# Patient Record
Sex: FEMALE | Race: WHITE | NOT HISPANIC OR LATINO | Employment: OTHER | URBAN - METROPOLITAN AREA
[De-identification: names, ages, dates, MRNs, and addresses within clinical notes are randomized per-mention and may not be internally consistent; named-entity substitution may affect disease eponyms.]

---

## 2017-01-24 ENCOUNTER — TRANSFERRED RECORDS (OUTPATIENT)
Dept: HEALTH INFORMATION MANAGEMENT | Facility: CLINIC | Age: 72
End: 2017-01-24

## 2017-02-14 ENCOUNTER — OFFICE VISIT (OUTPATIENT)
Dept: FAMILY MEDICINE | Facility: CLINIC | Age: 72
End: 2017-02-14
Payer: COMMERCIAL

## 2017-02-14 VITALS
HEIGHT: 63 IN | SYSTOLIC BLOOD PRESSURE: 118 MMHG | OXYGEN SATURATION: 96 % | TEMPERATURE: 98.5 F | DIASTOLIC BLOOD PRESSURE: 64 MMHG | HEART RATE: 110 BPM

## 2017-02-14 DIAGNOSIS — L98.9 EXTERNAL NASAL LESION: ICD-10-CM

## 2017-02-14 DIAGNOSIS — J20.9 ACUTE BRONCHITIS, UNSPECIFIED ORGANISM: Primary | ICD-10-CM

## 2017-02-14 PROCEDURE — 99213 OFFICE O/P EST LOW 20 MIN: CPT | Performed by: FAMILY MEDICINE

## 2017-02-14 RX ORDER — DOXYCYCLINE 100 MG/1
100 CAPSULE ORAL 2 TIMES DAILY
Qty: 14 CAPSULE | Refills: 0 | Status: SHIPPED | OUTPATIENT
Start: 2017-02-14 | End: 2017-02-21

## 2017-02-14 NOTE — MR AVS SNAPSHOT
After Visit Summary   2/14/2017    Claribel Del Rio    MRN: [id number]           Patient Information     Date Of Birth          1945        Visit Information        Provider Department      2/14/2017 1:00 PM Chata Wallace MD [facility] [place]        Today's Diagnoses     Acute bronchitis, unspecified organism    -  1    External nasal lesion           Follow-ups after your visit        Additional Services     DERMATOLOGY REFERRAL       Your provider has referred you to: [facility]: [facility] [place] [phone]   [email or web address]    Please be aware that coverage of these services is subject to the terms and limitations of your health insurance plan.  Call member services at your health plan with any benefit or coverage questions.      Please bring the following with you to your appointment:    (1) Any X-Rays, CTs or MRIs which have been performed.  Contact the facility where they were done to arrange for  prior to your scheduled appointment.    (2) List of current medications  (3) This referral request   (4) Any documents/labs given to you for this referral                  Who to contact     If you have questions or need follow up information about today's clinic visit or your schedule please contact [facility] [place] directly at [phone].  Normal or non-critical lab and imaging results will be communicated to you by MyChart, letter or phone within 4 business days after the clinic has received the results. If you do not hear from us within 7 days, please contact the clinic through MyChart or phone. If you have a critical or abnormal lab result, we will notify you by phone as soon as possible.  Submit refill requests through [employer] or call your pharmacy and they will forward the refill request to us. Please allow 3 business days for your refill to be completed.          Additional Information About Your Visit        MyChart  "Information     [employer] lets you send messages to your doctor, view your test results, renew your prescriptions, schedule appointments and more. To sign up, go to [email or web address] . Click on \"Log in\" on the left side of the screen, which will take you to the Welcome page. Then click on \"Sign up Now\" on the right side of the page.     You will be asked to enter the access code listed below, as well as some personal information. Please follow the directions to create your username and password.     Your access code is: [email or web address]  Expires: 3/8/2017 12:12 PM     Your access code will  in 90 days. If you need help or a new code, please call your [place] clinic or [phone].        Care EveryWhere ID     This is your Care EveryWhere ID. This could be used by other organizations to access your [place] medical records  [id number]        Your Vitals Were     Pulse Temperature Height Pulse Oximetry Breastfeeding?       110 98.5  F (36.9  C) (Tympanic) 5' 3.25\" (1.607 m) 96% No        Blood Pressure from Last 3 Encounters:   17 118/64   16 112/66   16 120/68    Weight from Last 3 Encounters:   16 184 lb (83.5 kg)   16 180 lb (81.6 kg)   16 184 lb (83.5 kg)              We Performed the Following     DERMATOLOGY REFERRAL          Today's Medication Changes          These changes are accurate as of: 17  1:21 PM.  If you have any questions, ask your nurse or doctor.               Start taking these medicines.        Dose/Directions    doxycycline Monohydrate 100 MG Caps   Used for:  Acute bronchitis, unspecified organism   Started by:  Chata Wallace MD        Dose:  100 mg   Take 1 capsule (100 mg) by mouth 2 times daily for 7 days   Quantity:  14 capsule   Refills:  0            Where to get your medicines      These medications were sent to [facility] [address] [facility] [address]   [address] [address] "     Phone:  [phone]     doxycycline Monohydrate 100 MG Caps                Primary Care Provider Office Phone # Fax #    Chata Wallace [phone]       [facility] [address] [place] [address] DR  [place] [address]        Thank you!     Thank you for choosing [facility]  [place] your [place]. Our goal is always to provide you with excellent care. Hearing back from our patients is one way we can continue to improve our services. Please take a few minutes to complete the written survey that you may receive in the mail after your visit with us. Thank you!             Your Updated Medication List - Protect others around you: Learn how to safely use, store and throw away your medicines at www.[employer]eds.org.          This list is accurate as of: 2/14/17  1:21 PM.  Always use your most recent med list.                   Brand Name Dispense Instructions for use    albuterol 108 (90 BASE) MCG/ACT Inhaler    PROAIR HFA/PROVENTIL HFA/VENTOLIN HFA    2 Inhaler    Inhale 2 puffs into the lungs every 4 hours as needed for shortness of breath / dyspnea or wheezing       doxycycline Monohydrate 100 MG Caps     14 capsule    Take 1 capsule (100 mg) by mouth 2 times daily for 7 days       escitalopram 20 MG tablet    LEXAPRO    30 tablet    Take 1 tablet (20 mg) by mouth daily       fexofenadine-pseudoePHEDrine  MG per 12 hr tablet    ALLEGRA-D     Take 1 tablet by mouth 2 times daily       fluticasone 50 MCG/ACT spray    FLONASE         fluticasone-salmeterol 500-50 MCG/DOSE diskus inhaler    ADVAIR DISKUS    3 Inhaler    Inhale 1 puff into the lungs 2 times daily       metFORMIN 500 MG 24 hr tablet    GLUCOPHAGE-XR    90 tablet    Take 1 tablet (500 mg) by mouth daily (with dinner)       montelukast 10 MG tablet    SINGULAIR    90 tablet    Take 1 tablet (10 mg) by mouth At Bedtime       OMEGA-3 FISH OIL PO      Take 1 g by mouth daily       omeprazole 20 MG CR capsule     priLOSEC         simvastatin 20 MG tablet    ZOCOR    90 tablet    Take 1 tablet (20 mg) by mouth At Bedtime       VITAMIN D3 PO      Take 1,000 Units by mouth daily       zolpidem 10 MG tablet    AMBIEN    30 tablet    1 TABLET AT BEDTIME AS NEEDED

## 2017-02-14 NOTE — NURSING NOTE
"Chief Complaint   Patient presents with     URI       Initial /64 (BP Location: Left arm, Patient Position: Chair, Cuff Size: Adult Regular)  Pulse 110  Temp 98.5  F (36.9  C) (Tympanic)  Ht 5' 3.25\" (1.607 m)  SpO2 96%  Breastfeeding? No Estimated body mass index is 32.34 kg/(m^2) as calculated from the following:    Height as of 12/9/16: 5' 3.25\" (1.607 m).    Weight as of 12/9/16: 184 lb (83.5 kg).  BP completed using cuff size: regular    Health Maintenance Due   Topic Date Due     FOOT EXAM Q1 YEAR( NO INBASKET)  05/11/1946     DEPRESSION ACTION PLAN Q1 YR (NO INBASKET)  05/07/2016     ADVANCE DIRECTIVE PLANNING Q5 YRS (NO INBASKET)  10/04/2016         Martha Schumacher MA 2/14/17        "

## 2017-02-14 NOTE — PROGRESS NOTES
SUBJECTIVE:                                                    Claribel Del Rio is a 71 year old female who presents to clinic today for the following health issues:      RESPIRATORY SYMPTOMS      Duration: 6 days     Description  cough, chills, headache and fatigue/malaise    Severity: mild    Accompanying signs and symptoms: Chest pain when coughing. Pt reports that her chest burns also when coughing.     History (predisposing factors):  none    Precipitating or alleviating factors: None    Therapies tried and outcome:  Mucinex and drinking fluids. Not effective.      Patient has h/o asthma. She tells that  She is using her Advair twice a day, Singulair, Allegra and Flonase. Uses albuterol as needed. If has not used albuterol. She has been wheezing off and on.    Problem list and histories reviewed & adjusted, as indicated.  Additional history: as documented    Patient Active Problem List   Diagnosis     Obesity     Fibromyalgia     Advanced directives, counseling/discussion     Allergic rhinitis     Low back pain     DJD (degenerative joint disease) of knee     MIRZA (obstructive sleep apnea)- on CPAP     Mild intermittent asthma without complication     Major depressive disorder     Status post total knee replacement     Primary osteoarthritis of right knee     Status post total right knee replacement     Type 2 diabetes mellitus without complication (H)     Hyperlipidemia LDL goal <100     Mild single current episode of major depressive disorder (H)     Osteopenia     Past Surgical History   Procedure Laterality Date     Hysterectomy total abdominal, bilateral salpingo-oophorectomy, combined  11/18/1992     Fibroids, endometriosis, Dr Rosas     Tonsillectomy  1950     Appendectomy  1992     Carpal tunnel release rt/lt  05/02, 08/02     right 5/2002, left 8/2002     Manometry (esophageal)  10/2007     Normal     Colonoscopy  4/2009     Normal; repeat in 10 years     Joint replacement  4/2010     bilateral thumb      Colonoscopy N/A 11/4/2015     Procedure: COLONOSCOPY;  Surgeon: Cathie Melendez MD;  Location: [facility]     Arthroplasty knee Right 6/7/2016     Procedure: ARTHROPLASTY KNEE;  Surgeon: Jose Alberto Gomez MD;  Location: [facility] OR     Punc/aspir breast cyst  11/2004       Social History   Substance Use Topics     Smoking status: Former Smoker     Packs/day: 0.00     Years: 6.00     Types: Cigarettes     Quit date: 6/9/1974     Smokeless tobacco: Never Used      Comment: Previous smoker, 1-1.5 packs per week     Alcohol use 2.4 - 3.6 oz/week     4 - 6 Standard drinks or equivalent per week      Comment: 1 drnk monthly     Family History   Problem Relation Age of Onset     Hypertension Father      Blood Disease Father      DVT's     Eye Disorder Father      Colon Cancer Father      Colon Cancer Paternal Uncle      Asthma Sister      C.A.D. Paternal Grandfather      DIABETES Maternal Aunt      Colon Cancer Maternal Uncle      Hyperlipidemia Paternal Grandmother      Hypertension Paternal Grandmother      Hyperlipidemia Other      Aunt     Hypertension Maternal Grandfather      Hypertension Other      Aunt     Breast Cancer No family hx of      Anesthesia Reaction No family hx of      OSTEOPOROSIS No family hx of      Thyroid Disease No family hx of          Current Outpatient Prescriptions   Medication Sig Dispense Refill     doxycycline Monohydrate 100 MG CAPS Take 1 capsule (100 mg) by mouth 2 times daily for 7 days 14 capsule 0     fluticasone (FLONASE) 50 MCG/ACT spray   10     omeprazole (PRILOSEC) 20 MG CR capsule   0     simvastatin (ZOCOR) 20 MG tablet Take 1 tablet (20 mg) by mouth At Bedtime 90 tablet 3     albuterol (PROAIR HFA, PROVENTIL HFA, VENTOLIN HFA) 108 (90 BASE) MCG/ACT inhaler Inhale 2 puffs into the lungs every 4 hours as needed for shortness of breath / dyspnea or wheezing 2 Inhaler 5     montelukast (SINGULAIR) 10 MG tablet Take 1 tablet (10 mg) by mouth At Bedtime 90 tablet 3      "fluticasone-salmeterol (ADVAIR DISKUS) 500-50 MCG/DOSE diskus inhaler Inhale 1 puff into the lungs 2 times daily 3 Inhaler 3     metFORMIN (GLUCOPHAGE-XR) 500 MG 24 hr tablet Take 1 tablet (500 mg) by mouth daily (with dinner) 90 tablet 3     fexofenadine-pseudoePHEDrine (ALLEGRA-D)  MG per tablet Take 1 tablet by mouth 2 times daily       Omega-3 Fatty Acids (OMEGA-3 FISH OIL PO) Take 1 g by mouth daily        Cholecalciferol (VITAMIN D3 PO) Take 1,000 Units by mouth daily        zolpidem (AMBIEN) 10 MG tablet 1 TABLET AT BEDTIME AS NEEDED 30 tablet 0     escitalopram (LEXAPRO) 20 MG tablet Take 1 tablet (20 mg) by mouth daily 30 tablet 1     Allergies   Allergen Reactions     Ivp Dye [Contrast Dye] Shortness Of Breath     Claritin [Loratadine]      Hallucinations         ROS:  INTEGUMENTARY/SKIN: Patient reports of the nasal lesion on the tip for the last 1 year. It is not healing.  GI: NEGATIVE for nausea, abdominal pain, heartburn, or change in bowel habits    OBJECTIVE:                                                    /64 (BP Location: Left arm, Patient Position: Chair, Cuff Size: Adult Regular)  Pulse 110  Temp 98.5  F (36.9  C) (Tympanic)  Ht 5' 3.25\" (1.607 m)  SpO2 96%  Breastfeeding? No  There is no height or weight on file to calculate BMI.   GENERAL: healthy, alert, well nourished, well hydrated, no distress  HENT: ear canals- normal; TMs- normal; Nose- normal; Mouth- no ulcers, no lesions  NECK: no tenderness, no adenopathy, no asymmetry, no masses, no stiffness; thyroid- normal to palpation  RESP: b/l diffuse rhonchi  With wheezes  CV: regular rates and rhythm, normal S1 S2, no S3 or S4 and no murmur, no click or rub -  ABDOMEN: soft, no tenderness, no  hepatosplenomegaly, no masses, normal bowel sounds  Skin: erythematous papule noted on the tip of the nose.       ASSESSMENT/PLAN:                                                      1. Acute bronchitis, unspecified organism  Recommending " to start doxycycline for acute bronchitis. Use albuterol for shortness of breath and wheezing as needed. Overall asthma symptoms are stable. Instructed to stay well hydrated. Continue the use of Mucinex. If symptoms are not improving in the next few days, instructed to notify me back  - doxycycline Monohydrate 100 MG CAPS; Take 1 capsule (100 mg) by mouth 2 times daily for 7 days  Dispense: 14 capsule; Refill: 0    2. External nasal lesion  Recommended to seek dermatology. Referral placed  - DERMATOLOGY REFERRAL        Follow up with Provider - as needed     Chata Wallace MD  [facility]

## 2017-02-21 ENCOUNTER — TRANSFERRED RECORDS (OUTPATIENT)
Dept: HEALTH INFORMATION MANAGEMENT | Facility: CLINIC | Age: 72
End: 2017-02-21

## 2017-03-06 ENCOUNTER — TRANSFERRED RECORDS (OUTPATIENT)
Dept: HEALTH INFORMATION MANAGEMENT | Facility: CLINIC | Age: 72
End: 2017-03-06

## 2017-06-07 ENCOUNTER — OFFICE VISIT (OUTPATIENT)
Dept: FAMILY MEDICINE | Facility: CLINIC | Age: 72
End: 2017-06-07
Payer: COMMERCIAL

## 2017-06-07 VITALS
DIASTOLIC BLOOD PRESSURE: 60 MMHG | HEART RATE: 92 BPM | TEMPERATURE: 97.6 F | SYSTOLIC BLOOD PRESSURE: 98 MMHG | BODY MASS INDEX: 33.45 KG/M2 | OXYGEN SATURATION: 98 % | WEIGHT: 188.8 LBS | HEIGHT: 63 IN

## 2017-06-07 DIAGNOSIS — F32.0 MILD SINGLE CURRENT EPISODE OF MAJOR DEPRESSIVE DISORDER (H): ICD-10-CM

## 2017-06-07 DIAGNOSIS — E11.9 TYPE 2 DIABETES MELLITUS WITHOUT COMPLICATION, WITHOUT LONG-TERM CURRENT USE OF INSULIN (H): ICD-10-CM

## 2017-06-07 DIAGNOSIS — R22.9 LOCALIZED SUPERFICIAL SWELLING, MASS, OR LUMP: ICD-10-CM

## 2017-06-07 DIAGNOSIS — J45.20 MILD INTERMITTENT ASTHMA WITHOUT COMPLICATION: ICD-10-CM

## 2017-06-07 DIAGNOSIS — R53.83 FATIGUE, UNSPECIFIED TYPE: Primary | ICD-10-CM

## 2017-06-07 LAB
ANION GAP SERPL CALCULATED.3IONS-SCNC: 7 MMOL/L (ref 3–14)
BUN SERPL-MCNC: 24 MG/DL (ref 7–30)
CALCIUM SERPL-MCNC: 8.5 MG/DL (ref 8.5–10.1)
CHLORIDE SERPL-SCNC: 109 MMOL/L (ref 94–109)
CO2 SERPL-SCNC: 27 MMOL/L (ref 20–32)
CREAT SERPL-MCNC: 0.65 MG/DL (ref 0.52–1.04)
GFR SERPL CREATININE-BSD FRML MDRD: 90 ML/MIN/1.7M2
GLUCOSE SERPL-MCNC: 226 MG/DL (ref 70–99)
HBA1C MFR BLD: 6.4 % (ref 4.3–6)
HGB BLD-MCNC: 13.9 G/DL (ref 11.7–15.7)
POTASSIUM SERPL-SCNC: 4 MMOL/L (ref 3.4–5.3)
SODIUM SERPL-SCNC: 143 MMOL/L (ref 133–144)
TSH SERPL DL<=0.005 MIU/L-ACNC: 0.76 MU/L (ref 0.4–4)

## 2017-06-07 PROCEDURE — 99214 OFFICE O/P EST MOD 30 MIN: CPT | Performed by: FAMILY MEDICINE

## 2017-06-07 PROCEDURE — 83036 HEMOGLOBIN GLYCOSYLATED A1C: CPT | Performed by: FAMILY MEDICINE

## 2017-06-07 PROCEDURE — 80048 BASIC METABOLIC PNL TOTAL CA: CPT | Performed by: FAMILY MEDICINE

## 2017-06-07 PROCEDURE — 85018 HEMOGLOBIN: CPT | Performed by: FAMILY MEDICINE

## 2017-06-07 PROCEDURE — 36415 COLL VENOUS BLD VENIPUNCTURE: CPT | Performed by: FAMILY MEDICINE

## 2017-06-07 PROCEDURE — 86618 LYME DISEASE ANTIBODY: CPT | Performed by: FAMILY MEDICINE

## 2017-06-07 PROCEDURE — 84443 ASSAY THYROID STIM HORMONE: CPT | Performed by: FAMILY MEDICINE

## 2017-06-07 ASSESSMENT — ANXIETY QUESTIONNAIRES
5. BEING SO RESTLESS THAT IT IS HARD TO SIT STILL: NOT AT ALL
1. FEELING NERVOUS, ANXIOUS, OR ON EDGE: NOT AT ALL
2. NOT BEING ABLE TO STOP OR CONTROL WORRYING: NOT AT ALL
6. BECOMING EASILY ANNOYED OR IRRITABLE: NOT AT ALL
GAD7 TOTAL SCORE: 0
3. WORRYING TOO MUCH ABOUT DIFFERENT THINGS: NOT AT ALL
7. FEELING AFRAID AS IF SOMETHING AWFUL MIGHT HAPPEN: NOT AT ALL

## 2017-06-07 ASSESSMENT — PATIENT HEALTH QUESTIONNAIRE - PHQ9: 5. POOR APPETITE OR OVEREATING: NOT AT ALL

## 2017-06-07 NOTE — LETTER
[place] [id number] [facility] [place] [address]                            [phone]  Fax: [phone]  June 9, 2017     Claribel Del Rio  [facility] [place] [address]      Dear Claribel,    I have reviewed your recent labs. Here are the results:    -Kidney function is normal (Cr, GFR), Sodium is normal, Potassium is normal, Calcium is normal    - Glucose is very high at 226.Monitor glucose at home. If higher reading noted, please call back to let me know.   A1c is at your goal., which tells that your overall diabetic control over the last 3 months is good !   Please recheck your A1C test in 3 months.     -TSH (thyroid stimulating hormone) level is normal which indicates normal thyroid function.  -Hemoglobin is normal.  There is no evidence of anemia.    -Lyme test is negative.      Results for orders placed or performed in visit on 06/07/17   Lyme Disease Romi with reflex to WB Serum   Result Value Ref Range    Lyme Disease Antibodies Serum 0.12 0.00 - 0.89   TSH with free T4 reflex   Result Value Ref Range    TSH 0.76 0.40 - 4.00 mU/L   Hemoglobin   Result Value Ref Range    Hemoglobin 13.9 11.7 - 15.7 g/dL   Hemoglobin A1c   Result Value Ref Range    Hemoglobin A1C 6.4 (H) 4.3 - 6.0 %   Basic metabolic panel   Result Value Ref Range    Sodium 143 133 - 144 mmol/L    Potassium 4.0 3.4 - 5.3 mmol/L    Chloride 109 94 - 109 mmol/L    Carbon Dioxide 27 20 - 32 mmol/L    Anion Gap 7 3 - 14 mmol/L    Glucose 226 (H) 70 - 99 mg/dL    Urea Nitrogen 24 7 - 30 mg/dL    Creatinine 0.65 0.52 - 1.04 mg/dL    GFR Estimate 90 >60 mL/min/1.7m2    GFR Estimate If Black >90   GFR Calc   >60 mL/min/1.7m2    Calcium 8.5 8.5 - 10.1 mg/dL         Thank you for choosing [facility] [place].  We appreciate the opportunity to serve you and look forward to supporting your healthcare needs in the future.    If you have any questions  or concerns, please call me or my staff at [phone].      Sincerely,      Rodrigo Brizuela M.D.

## 2017-06-07 NOTE — MR AVS SNAPSHOT
"              After Visit Summary   6/7/2017    Claribel Del Rio    MRN: [id number]           Patient Information     Date Of Birth          1945        Visit Information        Provider Department      6/7/2017 1:40 PM [facility]        Today's Diagnoses     Fatigue, unspecified type    -  1    Mild intermittent asthma without complication        Mild single current episode of major depressive disorder (H)        Type 2 diabetes mellitus without complication, without long-term current use of insulin (H)        Localized superficial swelling, mass, or lump           Follow-ups after your visit        Who to contact     If you have questions or need follow up information about today's clinic visit or your schedule please contact [facility] directly at [phone].  Normal or non-critical lab and imaging results will be communicated to you by MyChart, letter or phone within 4 business days after the clinic has received the results. If you do not hear from us within 7 days, please contact the clinic through MyChart or phone. If you have a critical or abnormal lab result, we will notify you by phone as soon as possible.  Submit refill requests through [employer] or call your pharmacy and they will forward the refill request to us. Please allow 3 business days for your refill to be completed.          Additional Information About Your Visit        MyChart Information     [employer] lets you send messages to your doctor, view your test results, renew your prescriptions, schedule appointments and more. To sign up, go to [email or web address] . Click on \"Log in\" on the left side of the screen, which will take you to the Welcome page. Then click on \"Sign up Now\" on the right side of the page.     You will be asked to enter the access code listed below, as well as some personal information. Please follow the directions to create your username and password.     Your " "access code is: [email or web address]  Expires: 2017  2:21 PM     Your access code will  in 90 days. If you need help or a new code, please call your [place] clinic or [phone].        Care EveryWhere ID     This is your Care EveryWhere ID. This could be used by other organizations to access your [place] medical records  [id number]        Your Vitals Were     Pulse Temperature Height Pulse Oximetry          92 97.6  F (36.4  C) (Tympanic) 5' 3.25\" (1.607 m) 98%         Blood Pressure from Last 3 Encounters:   17 98/60   17 118/64   16 112/66    Weight from Last 3 Encounters:   16 184 lb (83.5 kg)   16 180 lb (81.6 kg)   16 184 lb (83.5 kg)              We Performed the Following     Asthma Control Test - HIM Scan     Basic metabolic panel     Hemoglobin A1c     Hemoglobin     INI PHQ9 >9 REMISS <5     Lyme Disease Romi with reflex to WB Serum     TSH with free T4 reflex        Primary Care Provider Office Phone # Fax #    Chata Wallace [phone]       [facility] [place] [address] [place] [address] DR  [place] [address]        Thank you!     [facility]. Our goal is always to provide you with excellent care. Hearing back from our patients is one way we can continue to improve our services. Please take a few minutes to complete the written survey that you may receive in the mail after your visit with us. Thank you!             Your Updated Medication List - Protect others around you: Learn how to safely use, store and throw away your medicines at www.disposemymeds.org.          This list is accurate as of: 17  2:21 PM.  Always use your most recent med list.                   Brand Name Dispense Instructions for use    albuterol 108 (90 BASE) MCG/ACT Inhaler    PROAIR HFA/PROVENTIL HFA/VENTOLIN HFA    2 Inhaler    Inhale 2 puffs into the lungs every 4 hours as needed for shortness of breath / " dyspnea or wheezing       escitalopram 20 MG tablet    LEXAPRO    30 tablet    Take 1 tablet (20 mg) by mouth daily       fexofenadine-pseudoePHEDrine  MG per 12 hr tablet    ALLEGRA-D     Take 1 tablet by mouth 2 times daily       fluticasone 50 MCG/ACT spray    FLONASE         fluticasone-salmeterol 500-50 MCG/DOSE diskus inhaler    ADVAIR DISKUS    3 Inhaler    Inhale 1 puff into the lungs 2 times daily       metFORMIN 500 MG 24 hr tablet    GLUCOPHAGE-XR    90 tablet    Take 1 tablet (500 mg) by mouth daily (with dinner)       montelukast 10 MG tablet    SINGULAIR    90 tablet    Take 1 tablet (10 mg) by mouth At Bedtime       OMEGA-3 FISH OIL PO      Take 1 g by mouth daily       omeprazole 20 MG CR capsule    priLOSEC         simvastatin 20 MG tablet    ZOCOR    90 tablet    Take 1 tablet (20 mg) by mouth At Bedtime       VITAMIN D3 PO      Take 1,000 Units by mouth daily       zolpidem 10 MG tablet    AMBIEN    30 tablet    1 TABLET AT BEDTIME AS NEEDED

## 2017-06-07 NOTE — LETTER
My Depression Action Plan  Name: Claribel Del Rio   Date of Birth 1945  Date: 6/7/2017    My doctor: Chata Wallace   My clinic: [address] [address]  [phone]          GREEN    ZONE   Good Control    What it looks like:     Things are going generally well. You have normal up s and down s. You may even feel depressed from time to time, but bad moods usually last less than a day.   What you need to do:  1. Continue to care for yourself (see self care plan)  2. Check your depression survival kit and update it as needed  3. Follow your physician s recommendations including any medication.  4. Do not stop taking medication unless you consult with your physician first.           YELLOW         ZONE Getting Worse    What it looks like:     Depression is starting to interfere with your life.     It may be hard to get out of bed; you may be starting to isolate yourself from others.    Symptoms of depression are starting to last most all day and this has happened for several days.     You may have suicidal thoughts but they are not constant.   What you need to do:     1. Call your care team, your response to treatment will improve if you keep your care team informed of your progress. Yellow periods are signs an adjustment may need to be made.     2. Continue your self-care, even if you have to fake it!    3. Talk to someone in your support network    4. Open up your depression survival kit           RED    ZONE Medical Alert - Get Help    What it looks like:     Depression is seriously interfering with your life.     You may experience these or other symptoms: You can t get out of bed most days, can t work or engage in other necessary activities, you have trouble taking care of basic hygiene, or basic responsibilities, thoughts of suicide or death that will not go away, self-injurious behavior.     What you need to do:  1. Call your care  team and request a same-day appointment. If they are not available (weekends or after hours) call your local crisis line, emergency room or 911.      Electronically signed by: Sarah J. Severson, June 7, 2017    Depression Self Care Plan / Survival Kit    Self-Care for Depression  Here s the deal. Your body and mind are really not as separate as most people think.  What you do and think affects how you feel and how you feel influences what you do and think. This means if you do things that people who feel good do, it will help you feel better.  Sometimes this is all it takes.  There is also a place for medication and therapy depending on how severe your depression is, so be sure to consult with your medical provider and/ or Behavioral Health Consultant if your symptoms are worsening or not improving.     In order to better manage my stress, I will:    Exercise  Get some form of exercise, every day. This will help reduce pain and release endorphins, the  feel good  chemicals in your brain. This is almost as good as taking antidepressants!  This is not the same as joining a gym and then never going! (they count on that by the way ) It can be as simple as just going for a walk or doing some gardening, anything that will get you moving.      Hygiene   Maintain good hygiene (Get out of bed in the morning, Make your bed, Brush your teeth, Take a shower, and Get dressed like you were going to work, even if you are unemployed).  If your clothes don't fit try to get ones that do.    Diet  I will strive to eat foods that are good for me, drink plenty of water, and avoid excessive sugar, caffeine, alcohol, and other mood-altering substances.  Some foods that are helpful in depression are: complex carbohydrates, B vitamins, flaxseed, fish or fish oil, fresh fruits and vegetables.    Psychotherapy  I agree to participate in Individual Therapy (if recommended).    Medication  If prescribed medications, I agree to take them.   Missing doses can result in serious side effects.  I understand that drinking alcohol, or other illicit drug use, may cause potential side effects.  I will not stop my medication abruptly without first discussing it with my provider.    Staying Connected With Others  I will stay in touch with my friends, family members, and my primary care provider/team.    Use your imagination  Be creative.  We all have a creative side; it doesn t matter if it s oil painting, sand castles, or mud pies! This will also kick up the endorphins.    Witness Beauty  (AKA stop and smell the roses) Take a look outside, even in mid-winter. Notice colors, textures. Watch the squirrels and birds.     Service to others  Be of service to others.  There is always someone else in need.  By helping others we can  get out of ourselves  and remember the really important things.  This also provides opportunities for practicing all the other parts of the program.    Humor  Laugh and be silly!  Adjust your TV habits for less news and crime-drama and more comedy.    Control your stress  Try breathing deep, massage therapy, biofeedback, and meditation. Find time to relax each day.     My support system    Clinic Contact:  Phone number:    Contact 1:  Phone number:    Contact 2:  Phone number:    Anabaptist/:  Phone number:    Therapist:  Phone number:    Local crisis center:    Phone number:    Other community support:  Phone number:

## 2017-06-07 NOTE — NURSING NOTE
"Chief Complaint   Patient presents with     Fatigue     Mass       Initial BP 98/60  Pulse 92  Temp 97.6  F (36.4  C) (Tympanic)  Ht 5' 3.25\" (1.607 m)  Wt 188 lb 12.8 oz (85.6 kg)  SpO2 98%  BMI 33.18 kg/m2 Estimated body mass index is 33.18 kg/(m^2) as calculated from the following:    Height as of this encounter: 5' 3.25\" (1.607 m).    Weight as of this encounter: 188 lb 12.8 oz (85.6 kg).  Medication Reconciliation: complete  "

## 2017-06-07 NOTE — PROGRESS NOTES
SUBJECTIVE:                                                    Claribel Del Rio is a 72 year old female who presents to clinic today for the following health issues:      Concern  - lump under skin of left forearm - noticed about 1 week ago. No pain. Not sure how long she has had it for.          Concern - Sudden onset of fatigue and body aches for about 2 weeks. Her dog was tested positive for Lymes disease. She would like to be tested as well. Does not has h/o known tick bite.        Diabetes Follow-up      Diabetic concerns: None     Symptoms of hypoglycemia (low blood sugar): none     Paresthesias (numbness or burning in feet) or sores: No     Date of last diabetic eye exam:      Depression Followup    Status since last visit: Stable     See PHQ-9 for current symptoms.  Other associated symptoms: None    Complicating factors:   Significant life event:  No   Current substance abuse:  None  PHQ-9 SCORE 7/22/2016 12/9/2016 6/7/2017   Total Score - - -   Total Score 2 3 1         PHQ-9  English PHQ-9   Any Language          Asthma Follow-Up    Was ACT completed today?    Yes    ACT Total Scores 6/7/2017   ACT TOTAL SCORE (Goal Greater than or Equal to 20) 23   In the past 12 months, how many times did you visit the emergency room for your asthma without being admitted to the hospital? 0   In the past 12 months, how many times were you hospitalized overnight because of your asthma? 0       Recent asthma triggers that patient is dealing with: None        Problem list and histories reviewed & adjusted, as indicated.  Additional history: as documented    Patient Active Problem List   Diagnosis     Obesity     Fibromyalgia     Advanced directives, counseling/discussion     Allergic rhinitis     Low back pain     DJD (degenerative joint disease) of knee     MIRZA (obstructive sleep apnea)- on CPAP     Mild intermittent asthma without complication     Major depressive disorder     Status post total knee replacement      Primary osteoarthritis of right knee     Status post total right knee replacement     Type 2 diabetes mellitus without complication (H)     Hyperlipidemia LDL goal <100     Mild single current episode of major depressive disorder (H)     Osteopenia     Past Surgical History:   Procedure Laterality Date     APPENDECTOMY  1992     ARTHROPLASTY KNEE Right 6/7/2016    Procedure: ARTHROPLASTY KNEE;  Surgeon: Jose Alberto Gomez MD;  Location: [facility]     CARPAL TUNNEL RELEASE RT/LT  05/02, 08/02    right 5/2002, left 8/2002     COLONOSCOPY  4/2009    Normal; repeat in 10 years     COLONOSCOPY N/A 11/4/2015    Procedure: COLONOSCOPY;  Surgeon: Cathie Melendez MD;  Location: [facility]     HYSTERECTOMY TOTAL ABDOMINAL, BILATERAL SALPINGO-OOPHORECTOMY, COMBINED  11/18/1992    Fibroids, endometriosis, Dr Rosas     JOINT REPLACEMENT  4/2010    bilateral thumb     MANOMETRY (ESOPHAGEAL)  10/2007    Normal     PUNC/ASPIR BREAST CYST  11/2004     TONSILLECTOMY  1950       Social History   Substance Use Topics     Smoking status: Former Smoker     Packs/day: 0.00     Years: 6.00     Types: Cigarettes     Quit date: 6/9/1974     Smokeless tobacco: Never Used      Comment: Previous smoker, 1-1.5 packs per week     Alcohol use 2.4 - 3.6 oz/week     4 - 6 Standard drinks or equivalent per week      Comment: 1 drnk monthly     Family History   Problem Relation Age of Onset     Hypertension Father      Blood Disease Father      DVT's     Eye Disorder Father      Colon Cancer Father      Colon Cancer Paternal Uncle      Asthma Sister      C.A.D. Paternal Grandfather      DIABETES Maternal Aunt      Colon Cancer Maternal Uncle      Hyperlipidemia Paternal Grandmother      Hypertension Paternal Grandmother      Hyperlipidemia Other      Aunt     Hypertension Maternal Grandfather      Hypertension Other      Aunt     Breast Cancer No family hx of      Anesthesia Reaction No family hx of      OSTEOPOROSIS No family hx of      Thyroid Disease No  "family hx of          Current Outpatient Prescriptions   Medication Sig Dispense Refill     fluticasone (FLONASE) 50 MCG/ACT spray   10     omeprazole (PRILOSEC) 20 MG CR capsule   0     simvastatin (ZOCOR) 20 MG tablet Take 1 tablet (20 mg) by mouth At Bedtime 90 tablet 3     albuterol (PROAIR HFA, PROVENTIL HFA, VENTOLIN HFA) 108 (90 BASE) MCG/ACT inhaler Inhale 2 puffs into the lungs every 4 hours as needed for shortness of breath / dyspnea or wheezing 2 Inhaler 5     montelukast (SINGULAIR) 10 MG tablet Take 1 tablet (10 mg) by mouth At Bedtime 90 tablet 3     fluticasone-salmeterol (ADVAIR DISKUS) 500-50 MCG/DOSE diskus inhaler Inhale 1 puff into the lungs 2 times daily 3 Inhaler 3     metFORMIN (GLUCOPHAGE-XR) 500 MG 24 hr tablet Take 1 tablet (500 mg) by mouth daily (with dinner) 90 tablet 3     fexofenadine-pseudoePHEDrine (ALLEGRA-D)  MG per tablet Take 1 tablet by mouth 2 times daily       Omega-3 Fatty Acids (OMEGA-3 FISH OIL PO) Take 1 g by mouth daily        Cholecalciferol (VITAMIN D3 PO) Take 1,000 Units by mouth daily        zolpidem (AMBIEN) 10 MG tablet 1 TABLET AT BEDTIME AS NEEDED 30 tablet 0     escitalopram (LEXAPRO) 20 MG tablet Take 1 tablet (20 mg) by mouth daily 30 tablet 1     Allergies   Allergen Reactions     Ivp Dye [Contrast Dye] Shortness Of Breath     Claritin [Loratadine]      Hallucinations         Reviewed and updated as needed this visit by clinical staff       Reviewed and updated as needed this visit by Provider         ROS:  C: NEGATIVE for fever, chills, change in weight  E/M: NEGATIVE for ear, mouth and throat problems  R: NEGATIVE for significant cough or SOB  CV: NEGATIVE for chest pain, palpitations or peripheral edema    OBJECTIVE:                                                    BP 98/60  Pulse 92  Temp 97.6  F (36.4  C) (Tympanic)  Ht 5' 3.25\" (1.607 m)  Wt 188 lb 12.8 oz (85.6 kg)  SpO2 98%  BMI 33.18 kg/m2  Body mass index is 33.18 kg/(m^2).   GENERAL: " healthy, alert, well nourished, well hydrated, no distress  HENT: ear canals- normal; TMs- normal; Nose- normal; Mouth- no ulcers, no lesions  NECK: no tenderness, no adenopathy, no asymmetry, no masses, no stiffness; thyroid- normal to palpation  RESP: lungs clear to auscultation - no rales, no rhonchi, no wheezes  CV: regular rates and rhythm, normal S1 S2, no S3 or S4 and no murmur, no click or rub -  ABDOMEN: soft, no tenderness, no  hepatosplenomegaly, no masses, normal bowel sounds  SKIN: no suspicious  Rashes. Left forearm with a 2 cm subcutaneous lump, non tender. No fluctuation or warmth. No drainage.     Results for orders placed or performed in visit on 06/07/17   Lyme Disease Romi with reflex to WB Serum   Result Value Ref Range    Lyme Disease Antibodies Serum 0.12 0.00 - 0.89   TSH with free T4 reflex   Result Value Ref Range    TSH 0.76 0.40 - 4.00 mU/L   Hemoglobin   Result Value Ref Range    Hemoglobin 13.9 11.7 - 15.7 g/dL   Hemoglobin A1c   Result Value Ref Range    Hemoglobin A1C 6.4 (H) 4.3 - 6.0 %   Basic metabolic panel   Result Value Ref Range    Sodium 143 133 - 144 mmol/L    Potassium 4.0 3.4 - 5.3 mmol/L    Chloride 109 94 - 109 mmol/L    Carbon Dioxide 27 20 - 32 mmol/L    Anion Gap 7 3 - 14 mmol/L    Glucose 226 (H) 70 - 99 mg/dL    Urea Nitrogen 24 7 - 30 mg/dL    Creatinine 0.65 0.52 - 1.04 mg/dL    GFR Estimate 90 >60 mL/min/1.7m2    GFR Estimate If Black >90   GFR Calc   >60 mL/min/1.7m2    Calcium 8.5 8.5 - 10.1 mg/dL          ASSESSMENT/PLAN:                                                    -Kidney function is normal (Cr, GFR), Sodium is normal, Potassium is normal, Calcium is normal  -Glucose is very high at 226. Recommended to monitor glucose at home. If higher reading noted, instructed to notify me   A1c is at goal.  Recheck your A1C test in 3 months.   -TSH (thyroid stimulating hormone) level is normal which indicates normal thyroid function.  -Hemoglobin is  normal.  There is no evidence of anemia.  -Lyme test is negative.       1. Fatigue, unspecified type  Questionable etiology. If symptoms worsen, recommended to follow up  - Lyme Disease Romi with reflex to WB Serum  - TSH with free T4 reflex  - Hemoglobin  - Basic metabolic panel    2. Mild intermittent asthma without complication  Well controlled.   - Asthma Control Test - HIM Scan    3. Mild single current episode of major depressive disorder (H)  Well controlled.   - INI PHQ9 >9 REMISS <5    4. Type 2 diabetes mellitus without complication, without long-term current use of insulin (H)  See above  - Hemoglobin A1c  - Basic metabolic panel    5. Localized superficial swelling, mass, or lump  Subcutaneous lump in the left forearm, likely a very small lipoma versus subcutaneous cyst. It appears benign. If any change in size or symptoms associated to this occur, she is asked to follow up.       Follow up with Provider - as needed     Chata Wallace MD  [facility] [place]

## 2017-06-08 LAB — B BURGDOR IGG+IGM SER QL: 0.12 (ref 0–0.89)

## 2017-06-08 ASSESSMENT — ASTHMA QUESTIONNAIRES: ACT_TOTALSCORE: 23

## 2017-06-08 ASSESSMENT — ANXIETY QUESTIONNAIRES: GAD7 TOTAL SCORE: 0

## 2017-06-08 ASSESSMENT — PATIENT HEALTH QUESTIONNAIRE - PHQ9: SUM OF ALL RESPONSES TO PHQ QUESTIONS 1-9: 1

## 2017-07-24 DIAGNOSIS — E78.5 HYPERLIPIDEMIA LDL GOAL <100: ICD-10-CM

## 2017-07-24 NOTE — TELEPHONE ENCOUNTER
simvastatin (ZOCOR) 20 MG tablet  Last Written Prescription Date: 7-  Last Fill Quantity: 90, # refills: 3    Last Office Visit with G, P or [facility] prescribing provider:  6-7-2017   Future Office Visit:    Next 5 appointments (look out 90 days)     Jul 27, 2017 10:40 AM CDT   Return Visit with Jose Alberto Gomez MD   [facility] ([facility])    [address]  [address]  [place] [facility] [address]   [phone]                  Cholesterol   Date Value Ref Range Status   12/09/2016 159 <200 mg/dL Final     HDL Cholesterol   Date Value Ref Range Status   12/09/2016 48 (L) >49 mg/dL Final     LDL Cholesterol Calculated   Date Value Ref Range Status   12/09/2016 83 <100 mg/dL Final     Comment:     Desirable:       <100 mg/dl     Triglycerides   Date Value Ref Range Status   12/09/2016 139 <150 mg/dL Final     Cholesterol/HDL Ratio   Date Value Ref Range Status   05/24/2010 4.0 0.0 - 5.0 Final     ALT   Date Value Ref Range Status   12/09/2016 27 0 - 50 U/L Final

## 2017-07-25 RX ORDER — SIMVASTATIN 20 MG
20 TABLET ORAL AT BEDTIME
Qty: 90 TABLET | Refills: 1 | Status: SHIPPED | OUTPATIENT
Start: 2017-07-25 | End: 2017-12-08

## 2017-07-25 NOTE — TELEPHONE ENCOUNTER
Prescription approved per [facility], [facility] or [facility] refill protocol.  Rosaline Paniagua RN - Triage  [facility]

## 2017-07-27 ENCOUNTER — RADIANT APPOINTMENT (OUTPATIENT)
Dept: GENERAL RADIOLOGY | Facility: CLINIC | Age: 72
End: 2017-07-27
Attending: ORTHOPAEDIC SURGERY
Payer: COMMERCIAL

## 2017-07-27 ENCOUNTER — OFFICE VISIT (OUTPATIENT)
Dept: ORTHOPEDICS | Facility: CLINIC | Age: 72
End: 2017-07-27
Payer: COMMERCIAL

## 2017-07-27 DIAGNOSIS — M25.561 ARTHRALGIA OF RIGHT LOWER LEG: ICD-10-CM

## 2017-07-27 DIAGNOSIS — M25.561 ARTHRALGIA OF RIGHT LOWER LEG: Primary | ICD-10-CM

## 2017-07-27 DIAGNOSIS — M25.669 POSTOPERATIVE STIFFNESS OF TOTAL KNEE REPLACEMENT, SUBSEQUENT ENCOUNTER: ICD-10-CM

## 2017-07-27 DIAGNOSIS — T84.89XD POSTOPERATIVE STIFFNESS OF TOTAL KNEE REPLACEMENT, SUBSEQUENT ENCOUNTER: ICD-10-CM

## 2017-07-27 DIAGNOSIS — Z96.659 POSTOPERATIVE STIFFNESS OF TOTAL KNEE REPLACEMENT, SUBSEQUENT ENCOUNTER: ICD-10-CM

## 2017-07-27 PROCEDURE — 73562 X-RAY EXAM OF KNEE 3: CPT | Mod: RT | Performed by: ORTHOPAEDIC SURGERY

## 2017-07-27 PROCEDURE — 99213 OFFICE O/P EST LOW 20 MIN: CPT | Performed by: ORTHOPAEDIC SURGERY

## 2017-07-27 NOTE — MR AVS SNAPSHOT
"              After Visit Summary   2017    Claribel Del Rio    MRN: [id number]           Patient Information     Date Of Birth          1945        Visit Information        Provider Department      2017 10:40 AM Jose Alberto Gomez MD [facility]        Today's Diagnoses     Arthralgia of right lower leg    -  1    Postoperative stiffness of total knee replacement, subsequent encounter           Follow-ups after your visit        Who to contact     If you have questions or need follow up information about today's clinic visit or your schedule please contact [facility] directly at [phone].  Normal or non-critical lab and imaging results will be communicated to you by CloudEnginehart, letter or phone within 4 business days after the clinic has received the results. If you do not hear from us within 7 days, please contact the clinic through CloudEnginehart or phone. If you have a critical or abnormal lab result, we will notify you by phone as soon as possible.  Submit refill requests through Amimon or call your pharmacy and they will forward the refill request to us. Please allow 3 business days for your refill to be completed.          Additional Information About Your Visit        MyChart Information     Amimon lets you send messages to your doctor, view your test results, renew your prescriptions, schedule appointments and more. To sign up, go to [email or web address] . Click on \"Log in\" on the left side of the screen, which will take you to the Welcome page. Then click on \"Sign up Now\" on the right side of the page.     You will be asked to enter the access code listed below, as well as some personal information. Please follow the directions to create your username and password.     Your access code is: [email or web address]  Expires: 2017  2:21 PM     Your access code will  in 90 days. If you need help or a new code, please call your [place] clinic or " [phone].        Care EveryWhere ID     This is your Care EveryWhere ID. This could be used by other organizations to access your [place] medical records  [id number]         Blood Pressure from Last 3 Encounters:   06/07/17 98/60   02/14/17 118/64   12/09/16 112/66    Weight from Last 3 Encounters:   06/07/17 188 lb 12.8 oz (85.6 kg)   12/09/16 184 lb (83.5 kg)   12/08/16 180 lb (81.6 kg)              Today, you had the following     No orders found for display       Primary Care Provider Office Phone # Fax #    Chata Wallace [phone]       [facility] [place] [address]  [place] [address]        Equal Access to Services     [facility] Center[facility]ZMIN : Hadii aad ku hadasho Soomaali, waaxda luqadaha, qaybta kaalmada adeegyada, waxay bernadette haystarr becerra . So [facility] [phone].    ATENCIÓN: Si habla español, tiene a schaeffer disposición servicios gratuitos de asistencia lingüística. Afshin al [phone].    We comply with applicable federal civil rights laws and [place] laws. We do not discriminate on the basis of race, color, national origin, age, disability sex, sexual orientation or gender identity.            Thank you!     Thank you for choosing [facility]  for your care. Our goal is always to provide you with excellent care. Hearing back from our patients is one way we can continue to improve our services. Please take a few minutes to complete the written survey that you may receive in the mail after your visit with us. Thank you!             Your Updated Medication List - Protect others around you: Learn how to safely use, store and throw away your medicines at www.disposemymeds.org.          This list is accurate as of: 7/27/17 11:59 PM.  Always use your most recent med list.                   Brand Name Dispense Instructions for use Diagnosis    albuterol 108 (90 BASE) MCG/ACT Inhaler    PROAIR HFA/PROVENTIL HFA/VENTOLIN HFA    2 Inhaler     Inhale 2 puffs into the lungs every 4 hours as needed for shortness of breath / dyspnea or wheezing    Moderate persistent asthma without complication       escitalopram 20 MG tablet    LEXAPRO    30 tablet    Take 1 tablet (20 mg) by mouth daily    Major depression       fexofenadine-pseudoePHEDrine  MG per 12 hr tablet    ALLEGRA-D     Take 1 tablet by mouth 2 times daily        fluticasone 50 MCG/ACT spray    FLONASE          fluticasone-salmeterol 500-50 MCG/DOSE diskus inhaler    ADVAIR DISKUS    3 Inhaler    Inhale 1 puff into the lungs 2 times daily    Moderate persistent asthma without complication       metFORMIN 500 MG 24 hr tablet    GLUCOPHAGE-XR    90 tablet    Take 1 tablet (500 mg) by mouth daily (with dinner)    Type 2 diabetes mellitus without complication (H)       montelukast 10 MG tablet    SINGULAIR    90 tablet    Take 1 tablet (10 mg) by mouth At Bedtime    Moderate persistent asthma without complication       OMEGA-3 FISH OIL PO      Take 1 g by mouth daily        omeprazole 20 MG CR capsule    priLOSEC          simvastatin 20 MG tablet    ZOCOR    90 tablet    Take 1 tablet (20 mg) by mouth At Bedtime    Hyperlipidemia LDL goal <100       VITAMIN D3 PO      Take 1,000 Units by mouth daily        zolpidem 10 MG tablet    AMBIEN    30 tablet    1 TABLET AT BEDTIME AS NEEDED    Insomnia

## 2017-07-27 NOTE — LETTER
7/27/2017         RE: Claribel Del Rio  [address] [place] [address]        Dear Colleague,    Thank you for referring your patient, Claribel Del Rio, to the [facility] ORTHOPEDIC SURGERY. Please see a copy of my visit note below.    HISTORY OF PRESENT ILLNESS:    Claribel Del Rio is a 72 year old female who is seen in follow up for Left knee discomfort.  She is status post total knee arthroplasty from year ago  She has had tightness  With limitation of flexion. She has difficulty of going up and down the steps.  We thought that she has extra scar tissue limiting her flexion.  Because of increasing difficulty she wants to follow-up on scar tissue excision that we discussed on her last visit.  Present symptoms: *Pain and stiffness  Treatments tried to this point: Physical therapy  Past Medical History: Unchanged from the visit of April 1, 2016. Please refer to that note.    REVIEW OF SYSTEMS:  CONSTITUTIONAL:  NEGATIVE for fever, chills, change in weight  INTEGUMENTARY/SKIN:  NEGATIVE for worrisome rashes, moles or lesions  EYES:  NEGATIVE for vision changes or irritation  ENT/MOUTH:  NEGATIVE for ear, mouth and throat problems  RESP:  NEGATIVE for significant cough or SOB  BREAST:  NEGATIVE for masses, tenderness or discharge  CV:  NEGATIVE for chest pain, palpitations or peripheral edema  GI:  NEGATIVE for nausea, abdominal pain, heartburn, or change in bowel habits  :  Negative   MUSCULOSKELETAL:  See HPI above  NEURO:  NEGATIVE for weakness, dizziness or paresthesias  ENDOCRINE:  NEGATIVE for temperature intolerance, skin/hair changes  HEME/ALLERGY/IMMUNE:  NEGATIVE for bleeding problems  PSYCHIATRIC:  NEGATIVE for changes in mood or affect    PHYSICAL EXAM:  There were no vitals taken for this visit.  There is no height or weight on file to calculate BMI.   GENERAL APPEARANCE: healthy, alert and no distress   SKIN: no suspicious lesions or rashes  NEURO: Normal strength and tone, mentation  intact and speech normal  VASCULAR:  good pulses, and cappillary refill   LYMPH: no lymphadenopathy   PSYCH:  mentation appears normal and affect normal/bright    MSK:  The incision is healed well  Flexion is limited to 80  Extension is full, right knee  No erythema is noted  No swelling is noted  Tenderness in the quadriceps tendon    IMAGING INTERPRETATION:  X-ray will be done today     ASSESSMENT:  Stiff painful right total arthroplasty    PLAN:  We again reviewed/visualized the x-ray images of the knee from July 21, 2016.  No malpositioning was felt to be present.  We discussed the possibility of recurrent scar tissue formation even after the surgery. With that understood, she still wants to explore that option of scar to six fusion.  We will set up postoperative CPM at home for four weeks  This was scheduled sometime in the future according to her convenience.        Jose Alberto Gomez MD  Dept. Orthopedic Surgery  [facility]       Disclaimer: This note consists of symbols derived from keyboarding, dictation and/or voice recognition software. As a result, there may be errors in the script that have gone undetected. Please consider this when interpreting information found in this chart.          Again, thank you for allowing me to participate in the care of your patient.        Sincerely,        Jose Alberto Gomez MD

## 2017-07-27 NOTE — PROGRESS NOTES
HISTORY OF PRESENT ILLNESS:    Claribel Del Rio is a 72 year old female who is seen in follow up for Left knee discomfort.  She is status post total knee arthroplasty from year ago  She has had tightness  With limitation of flexion. She has difficulty of going up and down the steps.  We thought that she has extra scar tissue limiting her flexion.  Because of increasing difficulty she wants to follow-up on scar tissue excision that we discussed on her last visit.  Present symptoms: *Pain and stiffness  Treatments tried to this point: Physical therapy  Past Medical History: Unchanged from the visit of April 1, 2016. Please refer to that note.    REVIEW OF SYSTEMS:  CONSTITUTIONAL:  NEGATIVE for fever, chills, change in weight  INTEGUMENTARY/SKIN:  NEGATIVE for worrisome rashes, moles or lesions  EYES:  NEGATIVE for vision changes or irritation  ENT/MOUTH:  NEGATIVE for ear, mouth and throat problems  RESP:  NEGATIVE for significant cough or SOB  BREAST:  NEGATIVE for masses, tenderness or discharge  CV:  NEGATIVE for chest pain, palpitations or peripheral edema  GI:  NEGATIVE for nausea, abdominal pain, heartburn, or change in bowel habits  :  Negative   MUSCULOSKELETAL:  See HPI above  NEURO:  NEGATIVE for weakness, dizziness or paresthesias  ENDOCRINE:  NEGATIVE for temperature intolerance, skin/hair changes  HEME/ALLERGY/IMMUNE:  NEGATIVE for bleeding problems  PSYCHIATRIC:  NEGATIVE for changes in mood or affect    PHYSICAL EXAM:  There were no vitals taken for this visit.  There is no height or weight on file to calculate BMI.   GENERAL APPEARANCE: healthy, alert and no distress   SKIN: no suspicious lesions or rashes  NEURO: Normal strength and tone, mentation intact and speech normal  VASCULAR:  good pulses, and cappillary refill   LYMPH: no lymphadenopathy   PSYCH:  mentation appears normal and affect normal/bright    MSK:  The incision is healed well  Flexion is limited to 80  Extension is full, right  knee  No erythema is noted  No swelling is noted  Tenderness in the quadriceps tendon    IMAGING INTERPRETATION:  X-ray will be done today     ASSESSMENT:  Stiff painful right total arthroplasty    PLAN:  We again reviewed/visualized the x-ray images of the knee from July 21, 2016.  No malpositioning was felt to be present.  We discussed the possibility of recurrent scar tissue formation even after the surgery. With that understood, she still wants to explore that option of scar to six fusion.  We will set up postoperative CPM at home for four weeks  This was scheduled sometime in the future according to her convenience.        Jose Alberto Gomez MD  Dept. Orthopedic Surgery  [facility]       Disclaimer: This note consists of symbols derived from keyboarding, dictation and/or voice recognition software. As a result, there may be errors in the script that have gone undetected. Please consider this when interpreting information found in this chart.

## 2017-08-02 DIAGNOSIS — J45.40 MODERATE PERSISTENT ASTHMA WITHOUT COMPLICATION: ICD-10-CM

## 2017-08-02 NOTE — TELEPHONE ENCOUNTER
fluticasone-salmeterol (ADVAIR DISKUS) 500-50 MCG/DOSE       Last Written Prescription Date: 7/22/16  Last Fill Quantity: 3, # refills: 3    Last Office Visit with [facility], [facility] or [facility] prescribing provider:  6/7/17   Future Office Visit:       Date of Last Asthma Action Plan Letter:   Asthma Action Plan Q1 Year    Topic Date Due     Asthma Action Plan - yearly  07/22/2017      Asthma Control Test:   ACT Total Scores 6/7/2017   ACT TOTAL SCORE -   ASTHMA ER VISITS -   ASTHMA HOSPITALIZATIONS -   ACT TOTAL SCORE (Goal Greater than or Equal to 20) 23   In the past 12 months, how many times did you visit the emergency room for your asthma without being admitted to the hospital? 0   In the past 12 months, how many times were you hospitalized overnight because of your asthma? 0       Date of Last Spirometry Test:   No results found for this or any previous visit.

## 2017-08-02 NOTE — TELEPHONE ENCOUNTER
Prescription approved per [facility], [facility] or [facility] refill protocol.  Rosaline Paniagua RN - Triage  [facility]

## 2017-08-03 ENCOUNTER — TELEPHONE (OUTPATIENT)
Dept: ORTHOPEDICS | Facility: CLINIC | Age: 72
End: 2017-08-03

## 2017-08-03 DIAGNOSIS — Z96.651 STATUS POST TOTAL KNEE REPLACEMENT, RIGHT: Primary | ICD-10-CM

## 2017-08-03 NOTE — TELEPHONE ENCOUNTER
Dr. Gomez requested triple phase bone scan be completed prior to surgery (scheduled for 9/5/2017) to evaluate for hardware loosening.  Order placed per Dr. Gomez.     Pina De Dios, ATC

## 2017-08-08 ENCOUNTER — TELEPHONE (OUTPATIENT)
Dept: ORTHOPEDICS | Facility: CLINIC | Age: 72
End: 2017-08-08

## 2017-08-08 DIAGNOSIS — M25.561 ARTHRALGIA OF RIGHT LOWER LEG: Primary | ICD-10-CM

## 2017-08-08 NOTE — TELEPHONE ENCOUNTER
Patient calls and requests that PT orders be faxed to [address] [place] [address] PT department.   Orders completed and sent to [phone] via fax per Dr. Jose Alberto Gomez.    Pina De Dios ATC

## 2017-08-10 DIAGNOSIS — J45.40 MODERATE PERSISTENT ASTHMA WITHOUT COMPLICATION: ICD-10-CM

## 2017-08-10 RX ORDER — MONTELUKAST SODIUM 10 MG/1
TABLET ORAL
Qty: 90 TABLET | Refills: 2 | Status: SHIPPED | OUTPATIENT
Start: 2017-08-10 | End: 2017-12-08

## 2017-08-10 NOTE — TELEPHONE ENCOUNTER
Singulair       Last Written Prescription Date: 7/22/16  Last Fill Quantity: 90, # refills: 3    Last Office Visit with [facility], [facility] or  [facility] prescribing provider:  6/7/17   Future Office Visit:    Next 5 appointments (look out 90 days)     Aug 30, 2017 11:40 AM CDT   Pre-Op physical with Chata Wallace MD   [facility]    [address] [id number] [phone]            Sep 12, 2017 10:20 AM CDT   Return Visit with Nolan Aguilera PA-C   [facility] ORTHOPEDIC SURGERY ([facility])    [address]  [address]  [facility] [address]   [phone]                   Date of Last Asthma Action Plan Letter:   Asthma Action Plan Q1 Year    Topic Date Due     Asthma Action Plan - yearly  07/22/2017      Asthma Control Test:   ACT Total Scores 6/7/2017   ACT TOTAL SCORE -   ASTHMA ER VISITS -   ASTHMA HOSPITALIZATIONS -   ACT TOTAL SCORE (Goal Greater than or Equal to 20) 23   In the past 12 months, how many times did you visit the emergency room for your asthma without being admitted to the hospital? 0   In the past 12 months, how many times were you hospitalized overnight because of your asthma? 0       Date of Last Spirometry Test:   No results found for this or any previous visit.      RAPHAEL Skaggs LPN

## 2017-08-10 NOTE — TELEPHONE ENCOUNTER
Prescription approved per [facility], [facility] or [facility] refill protocol.  Rosaline Paniagua RN - Triage  [facility]

## 2017-08-16 NOTE — TELEPHONE ENCOUNTER
Patient called with concerns about Dr. Gomez ordering a bone scan for her and that she wasn't notified that the test was being order. She got nervous when she got a call from the radiology department because she thought that they were looking for bone cancer. Apologized for the confusion and assured her that the test was being ordered to evaluate her knee replacement and to make sure the components aren't loosening. She was thankful for the clarification.     Regan West ATC

## 2017-08-18 ENCOUNTER — HOSPITAL ENCOUNTER (OUTPATIENT)
Dept: NUCLEAR MEDICINE | Facility: CLINIC | Age: 72
Setting detail: NUCLEAR MEDICINE
End: 2017-08-18
Attending: ORTHOPAEDIC SURGERY
Payer: MEDICARE

## 2017-08-18 ENCOUNTER — HOSPITAL ENCOUNTER (OUTPATIENT)
Dept: NUCLEAR MEDICINE | Facility: CLINIC | Age: 72
Setting detail: NUCLEAR MEDICINE
Discharge: HOME | End: 2017-08-18
Attending: ORTHOPAEDIC SURGERY | Admitting: ORTHOPAEDIC SURGERY
Payer: MEDICARE

## 2017-08-18 DIAGNOSIS — Z96.651 STATUS POST TOTAL KNEE REPLACEMENT, RIGHT: ICD-10-CM

## 2017-08-18 PROCEDURE — 34300033 ZZH RX 343: Performed by: ORTHOPAEDIC SURGERY

## 2017-08-18 PROCEDURE — 78315 BONE IMAGING 3 PHASE: CPT

## 2017-08-18 PROCEDURE — A9503 TC99M MEDRONATE: HCPCS | Performed by: ORTHOPAEDIC SURGERY

## 2017-08-18 RX ORDER — TC 99M MEDRONATE 20 MG/10ML
25 INJECTION, POWDER, LYOPHILIZED, FOR SOLUTION INTRAVENOUS ONCE
Status: COMPLETED | OUTPATIENT
Start: 2017-08-18 | End: 2017-08-18

## 2017-08-18 RX ADMIN — TC 99M MEDRONATE 25.3 MCI.: 20 INJECTION, POWDER, LYOPHILIZED, FOR SOLUTION INTRAVENOUS at 10:00

## 2017-08-21 ENCOUNTER — OFFICE VISIT (OUTPATIENT)
Dept: ORTHOPEDICS | Facility: CLINIC | Age: 72
End: 2017-08-21
Payer: COMMERCIAL

## 2017-08-21 VITALS
DIASTOLIC BLOOD PRESSURE: 78 MMHG | WEIGHT: 180 LBS | HEIGHT: 64 IN | BODY MASS INDEX: 30.73 KG/M2 | SYSTOLIC BLOOD PRESSURE: 128 MMHG

## 2017-08-21 DIAGNOSIS — T84.84XD PAINFUL TOTAL KNEE REPLACEMENT, SUBSEQUENT ENCOUNTER: Primary | ICD-10-CM

## 2017-08-21 DIAGNOSIS — Z96.659 PAINFUL TOTAL KNEE REPLACEMENT, SUBSEQUENT ENCOUNTER: Primary | ICD-10-CM

## 2017-08-21 PROCEDURE — 99213 OFFICE O/P EST LOW 20 MIN: CPT | Performed by: ORTHOPAEDIC SURGERY

## 2017-08-21 NOTE — PROGRESS NOTES
"HISTORY OF PRESENT ILLNESS:    Claribel Del Rio is a 72 year old female who is seen in follow up for right knee pain and follow up of her bone serrano results from 8/18/17.  Present symptoms: pain and stiffness  Treatments tried to this point: Bone scan, No HEP since last visit  Past Medical History: Unchanged from the visit of 7/27/17. Please refer to that note.    REVIEW OF SYSTEMS:  CONSTITUTIONAL:  NEGATIVE for fever, chills, change in weight  INTEGUMENTARY/SKIN:  NEGATIVE for worrisome rashes, moles or lesions  EYES:  NEGATIVE for vision changes or irritation  ENT/MOUTH:  NEGATIVE for ear, mouth and throat problems  RESP:  NEGATIVE for significant cough or SOB  BREAST:  NEGATIVE for masses, tenderness or discharge  CV:  NEGATIVE for chest pain, palpitations or peripheral edema  GI:  NEGATIVE for nausea, abdominal pain, heartburn, or change in bowel habits  :  Negative   MUSCULOSKELETAL:  See HPI above  NEURO:  NEGATIVE for weakness, dizziness or paresthesias  ENDOCRINE:  NEGATIVE for temperature intolerance, skin/hair changes  HEME/ALLERGY/IMMUNE:  NEGATIVE for bleeding problems  PSYCHIATRIC:  NEGATIVE for changes in mood or affect    PHYSICAL EXAM:  /78  Ht 5' 3.5\" (1.613 m)  Wt 180 lb (81.6 kg)  BMI 31.39 kg/m2  Body mass index is 31.39 kg/(m^2).   GENERAL APPEARANCE: healthy, alert and no distress   SKIN: no suspicious lesions or rashes  NEURO: Normal strength and tone, mentation intact and speech normal  VASCULAR:  good pulses, and cappillary refill   LYMPH: no lymphadenopathy   PSYCH:  mentation appears normal and affect normal/bright    MSK:  She is actually walking quite well today  No erythema noted today  Limited range of motion  Otherwise unchanged from her previous visit Of July 27, 2017.    IMAGING INTERPRETATION:      NUCLEAR MEDICINE THREE PHASE BONE SCAN OF BOTH KNEES  8/18/2017 1:30  PM     HISTORY: Right knee pain. Total knee arthroplasty on 6/7/2016.     COMPARISON:       Prior bone " scan: None.       Other relevant study: Radiographs of the right knee on 7/27/2017.     TECHNIQUE: Following the uneventful intravenous administration of 25.3  mCi of technetium 99m MDP, routine three phase imaging was performed  of both knees.     FINDINGS: Blood flow imaging demonstrates moderate increased blood  flow to the right knee consistent with hyperemia. Blood pool imaging  shows increased uptake surrounding the femoral component of the right  knee arthroplasty. Delayed images demonstrate the right knee  arthroplasty with moderate increased uptake adjacent to both the  femoral and tibial components. There is mild increased uptake in the  medial compartment of the left knee consistent with degenerative  change. No other abnormal osseous uptake is demonstrated. No soft  tissue abnormality is seen.         IMPRESSION: Increased uptake on all 3 phases surrounding the right  knee arthroplasty. This is suspicious for infection.      RAGHAV SANTAMARIA MD     ASSESSMENT:  Painful stiff right total knee arthroplasty  Possible low-grade infection    PLAN:  Bone scan images were visualized. Findings are thoroughly discussed.  Based on the result of triple phase bone scan, there is some concern for low-grade infection of right total arthroplasty. For that reason, there is very slight chance of having to remove implants at the time the surgery along with placement of antibiotic impregnated methylmethacrylate.  Most likely, we will obtain tissue samples for culture.  Implant extraction may be secondary depending on the culture result.  Today's visit was mainly or us to go over the different scenarios related to the surgery that is scheduled.  Other questions were answered.    Jose Alberto Gomez MD  Dept. Orthopedic Surgery  [facility]       Disclaimer: This note consists of symbols derived from keyboarding, dictation and/or voice recognition software. As a result, there may be errors in the script that have gone  undetected. Please consider this when interpreting information found in this chart.

## 2017-08-21 NOTE — NURSING NOTE
"Chief Complaint   Patient presents with     RECHECK     BT knee pain       Initial /78  Ht 5' 3.5\" (1.613 m)  Wt 180 lb (81.6 kg)  BMI 31.39 kg/m2 Estimated body mass index is 31.39 kg/(m^2) as calculated from the following:    Height as of this encounter: 5' 3.5\" (1.613 m).    Weight as of this encounter: 180 lb (81.6 kg).  Medication Reconciliation: complete   Narendra Meier, ATC    "

## 2017-08-21 NOTE — MR AVS SNAPSHOT
After Visit Summary   8/21/2017    Claribel Del Rio    MRN: [id number]           Patient Information     Date Of Birth          1945        Visit Information        Provider Department      8/21/2017 3:40 PM Jose Alberto Gomez MD [facility] ORTHOPEDIC SURGERY        Today's Diagnoses     Painful total knee replacement, subsequent encounter    -  1       Follow-ups after your visit        Your next 10 appointments already scheduled     Aug 30, 2017 11:40 AM CDT   Pre-Op physical with Chata Wallace MD   [facility]    [address] [address]   [phone]            Sep 05, 2017   Procedure with Jose Alberto Gomez MD   [facility] (--)    [address] [facility] [id number] [phone]            Sep 12, 2017 10:20 AM CDT   Return Visit with Nolan Aguilera PA-C   [facility] ORTHOPEDIC SURGERY ([place] [facility])    [address]  [address]  [place] [address]   [phone]              Who to contact     If you have questions or need follow up information about today's clinic visit or your schedule please contact [facility] ORTHOPEDIC SURGERY directly at [phone].  Normal or non-critical lab and imaging results will be communicated to you by Bliphart, letter or phone within 4 business days after the clinic has received the results. If you do not hear from us within 7 days, please contact the clinic through MyChart or phone. If you have a critical or abnormal lab result, we will notify you by phone as soon as possible.  Submit refill requests through Payment plugin or call your pharmacy and they will forward the refill request to us. Please allow 3 business days for your refill to be completed.          Additional Information About Your Visit        Payment plugin Information     Payment plugin lets you send messages to your doctor, view your test results, renew  "your prescriptions, schedule appointments and more. To sign up, go to [email or web address] . Click on \"Log in\" on the left side of the screen, which will take you to the Welcome page. Then click on \"Sign up Now\" on the right side of the page.     You will be asked to enter the access code listed below, as well as some personal information. Please follow the directions to create your username and password.     Your access code is: [email or web address]  Expires: 2017  2:21 PM     Your access code will  in 90 days. If you need help or a new code, please call your [facility] clinic or [phone].        Care EveryWhere ID     This is your Care EveryWhere ID. This could be used by other organizations to access your [facility] medical records  [id number]        Your Vitals Were     Height BMI (Body Mass Index)                5' 3.5\" (1.613 m) 31.39 kg/m2           Blood Pressure from Last 3 Encounters:   17 128/78   17 98/60   17 118/64    Weight from Last 3 Encounters:   17 180 lb (81.6 kg)   17 188 lb 12.8 oz (85.6 kg)   16 184 lb (83.5 kg)              Today, you had the following     No orders found for display       Primary Care Provider Office Phone # Fax #    Chata Wallace [phone]       [address]  [place] [address]        Equal Access to Services     [facility] AH: Hadii anderson love hadasho Soomaali, waaxda luqadaha, qaybta kaalmada adefran, thalia becerra . So [place] [phone].    ATENCIÓN: Si habla español, tiene a schaeffer disposición servicios gratuitos de asistencia lingüística. Llame al [phone].    We comply with applicable federal civil rights laws and [place] laws. We do not discriminate on the basis of race, color, national origin, age, disability sex, sexual orientation or gender identity.            Thank you!     Thank you for choosing [facility] ORTHOPEDIC SURGERY  for your care. Our goal is " always to provide you with excellent care. Hearing back from our patients is one way we can continue to improve our services. Please take a few minutes to complete the written survey that you may receive in the mail after your visit with us. Thank you!             Your Updated Medication List - Protect others around you: Learn how to safely use, store and throw away your medicines at www.disposemymeds.org.          This list is accurate as of: 8/21/17  4:15 PM.  Always use your most recent med list.                   Brand Name Dispense Instructions for use Diagnosis    albuterol 108 (90 BASE) MCG/ACT Inhaler    PROAIR HFA/PROVENTIL HFA/VENTOLIN HFA    2 Inhaler    Inhale 2 puffs into the lungs every 4 hours as needed for shortness of breath / dyspnea or wheezing    Moderate persistent asthma without complication       escitalopram 20 MG tablet    LEXAPRO    30 tablet    Take 1 tablet (20 mg) by mouth daily    Major depression       fexofenadine-pseudoePHEDrine  MG per 12 hr tablet    ALLEGRA-D     Take 1 tablet by mouth 2 times daily        fluticasone 50 MCG/ACT spray    FLONASE          fluticasone-salmeterol 500-50 MCG/DOSE diskus inhaler    ADVAIR DISKUS    3 Inhaler    Inhale 1 puff into the lungs 2 times daily    Moderate persistent asthma without complication       metFORMIN 500 MG 24 hr tablet    GLUCOPHAGE-XR    90 tablet    Take 1 tablet (500 mg) by mouth daily (with dinner)    Type 2 diabetes mellitus without complication (H)       montelukast 10 MG tablet    SINGULAIR    90 tablet    TAKE 1 TABLET(10 MG) BY MOUTH AT BEDTIME    Moderate persistent asthma without complication       OMEGA-3 FISH OIL PO      Take 1 g by mouth daily        omeprazole 20 MG CR capsule    priLOSEC          simvastatin 20 MG tablet    ZOCOR    90 tablet    Take 1 tablet (20 mg) by mouth At Bedtime    Hyperlipidemia LDL goal <100       VITAMIN D3 PO      Take 1,000 Units by mouth daily        zolpidem 10 MG tablet     AMBIEN    30 tablet    1 TABLET AT BEDTIME AS NEEDED    Insomnia

## 2017-08-21 NOTE — LETTER
"    8/21/2017         RE: Claribel Del Rio  [address] [place] [address]        Dear Colleague,    Thank you for referring your patient, Claribel Del Rio, to the [facility] ORTHOPEDIC SURGERY. Please see a copy of my visit note below.    HISTORY OF PRESENT ILLNESS:    Claribel Del Rio is a 72 year old female who is seen in follow up for right knee pain and follow up of her bone serrano results from 8/18/17.  Present symptoms: pain and stiffness  Treatments tried to this point: Bone scan, No HEP since last visit  Past Medical History: Unchanged from the visit of 7/27/17. Please refer to that note.    REVIEW OF SYSTEMS:  CONSTITUTIONAL:  NEGATIVE for fever, chills, change in weight  INTEGUMENTARY/SKIN:  NEGATIVE for worrisome rashes, moles or lesions  EYES:  NEGATIVE for vision changes or irritation  ENT/MOUTH:  NEGATIVE for ear, mouth and throat problems  RESP:  NEGATIVE for significant cough or SOB  BREAST:  NEGATIVE for masses, tenderness or discharge  CV:  NEGATIVE for chest pain, palpitations or peripheral edema  GI:  NEGATIVE for nausea, abdominal pain, heartburn, or change in bowel habits  :  Negative   MUSCULOSKELETAL:  See HPI above  NEURO:  NEGATIVE for weakness, dizziness or paresthesias  ENDOCRINE:  NEGATIVE for temperature intolerance, skin/hair changes  HEME/ALLERGY/IMMUNE:  NEGATIVE for bleeding problems  PSYCHIATRIC:  NEGATIVE for changes in mood or affect    PHYSICAL EXAM:  /78  Ht 5' 3.5\" (1.613 m)  Wt 180 lb (81.6 kg)  BMI 31.39 kg/m2  Body mass index is 31.39 kg/(m^2).   GENERAL APPEARANCE: healthy, alert and no distress   SKIN: no suspicious lesions or rashes  NEURO: Normal strength and tone, mentation intact and speech normal  VASCULAR:  good pulses, and cappillary refill   LYMPH: no lymphadenopathy   PSYCH:  mentation appears normal and affect normal/bright    MSK:  She is actually walking quite well today  No erythema noted today  Limited range of motion  Otherwise " unchanged from her previous visit Of July 27, 2017.    IMAGING INTERPRETATION:      NUCLEAR MEDICINE THREE PHASE BONE SCAN OF BOTH KNEES  8/18/2017 1:30  PM     HISTORY: Right knee pain. Total knee arthroplasty on 6/7/2016.     COMPARISON:       Prior bone scan: None.       Other relevant study: Radiographs of the right knee on 7/27/2017.     TECHNIQUE: Following the uneventful intravenous administration of 25.3  mCi of technetium 99m MDP, routine three phase imaging was performed  of both knees.     FINDINGS: Blood flow imaging demonstrates moderate increased blood  flow to the right knee consistent with hyperemia. Blood pool imaging  shows increased uptake surrounding the femoral component of the right  knee arthroplasty. Delayed images demonstrate the right knee  arthroplasty with moderate increased uptake adjacent to both the  femoral and tibial components. There is mild increased uptake in the  medial compartment of the left knee consistent with degenerative  change. No other abnormal osseous uptake is demonstrated. No soft  tissue abnormality is seen.         IMPRESSION: Increased uptake on all 3 phases surrounding the right  knee arthroplasty. This is suspicious for infection.      RAGHAV SANTAMARIA MD     ASSESSMENT:  Painful stiff right total knee arthroplasty  Possible low-grade infection    PLAN:  Bone scan images were visualized. Findings are thoroughly discussed.  Based on the result of triple phase bone scan, there is some concern for low-grade infection of right total arthroplasty. For that reason, there is very slight chance of having to remove implants at the time the surgery along with placement of antibiotic impregnated methylmethacrylate.  Most likely, we will obtain tissue samples for culture.  Implant extraction may be secondary depending on the culture result.  Today's visit was mainly or us to go over the different scenarios related to the surgery that is scheduled.  Other questions were  answered.    Jose Alberto Gomez MD  Dept. Orthopedic Surgery  [facility]       Disclaimer: This note consists of symbols derived from keyboarding, dictation and/or voice recognition software. As a result, there may be errors in the script that have gone undetected. Please consider this when interpreting information found in this chart.      Again, thank you for allowing me to participate in the care of your patient.        Sincerely,        Jose Alberto Gomez MD

## 2017-08-30 ENCOUNTER — OFFICE VISIT (OUTPATIENT)
Dept: FAMILY MEDICINE | Facility: CLINIC | Age: 72
End: 2017-08-30
Payer: COMMERCIAL

## 2017-08-30 VITALS
DIASTOLIC BLOOD PRESSURE: 78 MMHG | SYSTOLIC BLOOD PRESSURE: 120 MMHG | TEMPERATURE: 97 F | HEART RATE: 66 BPM | OXYGEN SATURATION: 98 % | HEIGHT: 64 IN

## 2017-08-30 DIAGNOSIS — Z71.89 ADVANCED DIRECTIVES, COUNSELING/DISCUSSION: ICD-10-CM

## 2017-08-30 DIAGNOSIS — F32.0 MILD SINGLE CURRENT EPISODE OF MAJOR DEPRESSIVE DISORDER (H): ICD-10-CM

## 2017-08-30 DIAGNOSIS — Z96.651 STATUS POST TOTAL RIGHT KNEE REPLACEMENT: ICD-10-CM

## 2017-08-30 DIAGNOSIS — E11.9 TYPE 2 DIABETES MELLITUS WITHOUT COMPLICATION, WITHOUT LONG-TERM CURRENT USE OF INSULIN (H): ICD-10-CM

## 2017-08-30 DIAGNOSIS — J45.40 MODERATE PERSISTENT ASTHMA WITHOUT COMPLICATION: ICD-10-CM

## 2017-08-30 DIAGNOSIS — G47.33 OSA (OBSTRUCTIVE SLEEP APNEA): ICD-10-CM

## 2017-08-30 DIAGNOSIS — Z01.818 PREOP GENERAL PHYSICAL EXAM: Primary | ICD-10-CM

## 2017-08-30 LAB
BASOPHILS # BLD AUTO: 0 10E9/L (ref 0–0.2)
BASOPHILS NFR BLD AUTO: 0.5 %
DIFFERENTIAL METHOD BLD: NORMAL
EOSINOPHIL # BLD AUTO: 0.2 10E9/L (ref 0–0.7)
EOSINOPHIL NFR BLD AUTO: 2.6 %
ERYTHROCYTE [DISTWIDTH] IN BLOOD BY AUTOMATED COUNT: 13.5 % (ref 10–15)
HBA1C MFR BLD: 6.2 % (ref 4.3–6)
HCT VFR BLD AUTO: 41.4 % (ref 35–47)
HGB BLD-MCNC: 13.3 G/DL (ref 11.7–15.7)
LYMPHOCYTES # BLD AUTO: 2 10E9/L (ref 0.8–5.3)
LYMPHOCYTES NFR BLD AUTO: 31.7 %
MCH RBC QN AUTO: 29.2 PG (ref 26.5–33)
MCHC RBC AUTO-ENTMCNC: 32.1 G/DL (ref 31.5–36.5)
MCV RBC AUTO: 91 FL (ref 78–100)
MONOCYTES # BLD AUTO: 0.4 10E9/L (ref 0–1.3)
MONOCYTES NFR BLD AUTO: 6.7 %
NEUTROPHILS # BLD AUTO: 3.7 10E9/L (ref 1.6–8.3)
NEUTROPHILS NFR BLD AUTO: 58.5 %
PLATELET # BLD AUTO: 210 10E9/L (ref 150–450)
RBC # BLD AUTO: 4.56 10E12/L (ref 3.8–5.2)
WBC # BLD AUTO: 6.3 10E9/L (ref 4–11)

## 2017-08-30 PROCEDURE — 93000 ELECTROCARDIOGRAM COMPLETE: CPT | Performed by: FAMILY MEDICINE

## 2017-08-30 PROCEDURE — 83036 HEMOGLOBIN GLYCOSYLATED A1C: CPT | Performed by: FAMILY MEDICINE

## 2017-08-30 PROCEDURE — 36415 COLL VENOUS BLD VENIPUNCTURE: CPT | Performed by: FAMILY MEDICINE

## 2017-08-30 PROCEDURE — 80048 BASIC METABOLIC PNL TOTAL CA: CPT | Performed by: FAMILY MEDICINE

## 2017-08-30 PROCEDURE — 85025 COMPLETE CBC W/AUTO DIFF WBC: CPT | Performed by: FAMILY MEDICINE

## 2017-08-30 PROCEDURE — 99215 OFFICE O/P EST HI 40 MIN: CPT | Performed by: FAMILY MEDICINE

## 2017-08-30 RX ORDER — ALBUTEROL SULFATE 90 UG/1
2 AEROSOL, METERED RESPIRATORY (INHALATION) EVERY 4 HOURS PRN
Qty: 1 INHALER | Refills: 5 | Status: SHIPPED | OUTPATIENT
Start: 2017-08-30 | End: 2018-12-10

## 2017-08-30 ASSESSMENT — ANXIETY QUESTIONNAIRES
6. BECOMING EASILY ANNOYED OR IRRITABLE: SEVERAL DAYS
3. WORRYING TOO MUCH ABOUT DIFFERENT THINGS: NOT AT ALL
GAD7 TOTAL SCORE: 6
1. FEELING NERVOUS, ANXIOUS, OR ON EDGE: MORE THAN HALF THE DAYS
IF YOU CHECKED OFF ANY PROBLEMS ON THIS QUESTIONNAIRE, HOW DIFFICULT HAVE THESE PROBLEMS MADE IT FOR YOU TO DO YOUR WORK, TAKE CARE OF THINGS AT HOME, OR GET ALONG WITH OTHER PEOPLE: NOT DIFFICULT AT ALL
5. BEING SO RESTLESS THAT IT IS HARD TO SIT STILL: SEVERAL DAYS
7. FEELING AFRAID AS IF SOMETHING AWFUL MIGHT HAPPEN: NOT AT ALL
2. NOT BEING ABLE TO STOP OR CONTROL WORRYING: NOT AT ALL

## 2017-08-30 ASSESSMENT — PATIENT HEALTH QUESTIONNAIRE - PHQ9
SUM OF ALL RESPONSES TO PHQ QUESTIONS 1-9: 9
5. POOR APPETITE OR OVEREATING: MORE THAN HALF THE DAYS

## 2017-08-30 NOTE — PROGRESS NOTES
[facility] [place]  [address] [address]  [phone]  Dept: [phone]    PRE-OP EVALUATION:  Today's date: 2017    Claribel Del Rio (: 1945) presents for pre-operative evaluation assessment as requested by Dr. Jose Alberto Delacruz.  She requires evaluation and anesthesia risk assessment prior to undergoing surgery/procedure.    Proposed procedure: COMBINED IRRIGATION AND DEBRIDEMENT KNEE    Date of Surgery/ Procedure: 17  Time of Surgery/ Procedure: 3:20pm  Hospital/Surgical Facility: [facility]  Fax number for surgical facility: [facility]  Primary Physician: Chata Wallace  Type of Anesthesia Anticipated: to be determined    Patient has a Health Care Directive or Living Will:  NO    1. NO - Do you have a history of heart attack, stroke, stent, bypass or surgery on an artery in the head, neck, heart or legs?  2. NO- Do you ever have any pain or discomfort in your chest?  3. NO - Do you have a history of  Heart Failure?  4. YES due to asthma - Are you troubled by shortness of breath when: walking on the level, up a slight hill or at night?  5. NO - Do you currently have a cold, bronchitis or other respiratory infection?  6. YES - Do you have a cough, shortness of breath or wheezing?  7. NO - Do you sometimes get pains in the calves of your legs when you walk?  8. NO - Do you or anyone in your family have previous history of blood clots?  9. NO - Do you or does anyone in your family have a serious bleeding problem such as prolonged bleeding following surgeries or cuts?  10. NO - Have you ever had problems with anemia or been told to take iron pills?  11. NO - Have you had any abnormal blood loss such as black, tarry or bloody stools, or abnormal vaginal bleeding?  12. NO - Have you ever had a blood transfusion?  13. NO - Have you or any of your relatives ever had problems with anesthesia?  14. YES Sleep apena - Do you have sleep apnea, excessive snoring or  daytime drowsiness? Does not use CPAP regularly as the mask does not fit well.   15. NO - Do you have any prosthetic heart valves?  16. YES Right knee and both thumb joints - Do you have prosthetic joints?  17. NO - Is there any chance that you may be pregnant?        HPI:                                                      Brief HPI related to upcoming procedure: COMBINED IRRIGATION AND DEBRIDEMENT KNEE. S/p knee replacement. Recently was seen for evaluation of pain in the knee and had a bone scan, which shows suspicion for infection.       DIABETES - Patient has a longstanding history of DiabetesType Type II . Patient is being treated with oral agents and denies significant side effects. Control has been good.       Lab Results   Component Value Date    A1C 6.2 08/30/2017                                                                                                           .  HYPERLIPIDEMIA - Patient has a long history of significant Hyperlipidemia requiring medication for treatment with recent good control. Patient reports no problems or side effects with the medication.      LDL Cholesterol Calculated   Date Value Ref Range Status   12/09/2016 83 <100 mg/dL Final     Comment:     Desirable:       <100 mg/dl                                                                                                                                                        .  DEPRESSION - Patient has a long history of Depression of moderate severity requiring medication for control with recent symptoms slightly worse. Worried about her up coming knee surgery.    PHQ-9 score:    PHQ-9 SCORE 12/9/2016 6/7/2017 8/30/2017   Total Score - - -   Total Score 3 1 9                                                                                                                                                                                        .  ASTHMA - Patient has a longstanding history of  Asthma . Recently with the weather  changing, her symptoms have gotten worse.   Using Advair 500/50, 1 puff BID, Singulair 10 mg QD, Allegra-D daily and albuterol prn. She has not been using albuterol much these days.    ACT Total Scores 12/9/2016 6/7/2017 8/30/2017   ACT TOTAL SCORE - - -   ASTHMA ER VISITS - - -   ASTHMA HOSPITALIZATIONS - - -   ACT TOTAL SCORE (Goal Greater than or Equal to 20) 22 23 14   In the past 12 months, how many times did you visit the emergency room for your asthma without being admitted to the hospital? 0 0 0   In the past 12 months, how many times were you hospitalized overnight because of your asthma? 0 0 0                                                                                                                                         .  SLEEP PROBLEM - Patient has a longstanding history of MIRZA. Not using her CPAP regularly as she needs a new mask.                                                                                                                                  .    MEDICAL HISTORY:                                                    Patient Active Problem List    Diagnosis Date Noted     Fibromyalgia 12/20/2010     Priority: High     Osteopenia 08/08/2016     Priority: Medium     Type 2 diabetes mellitus without complication (H) 07/22/2016     Priority: Medium     Hyperlipidemia LDL goal <100 07/22/2016     Priority: Medium     Mild single current episode of major depressive disorder (H) 07/22/2016     Priority: Medium     Status post total right knee replacement 06/13/2016     Priority: Medium     Mild intermittent asthma without complication 05/20/2016     Priority: Medium     MIRZA (obstructive sleep apnea)- on CPAP 01/15/2016     Priority: Medium     Low back pain 08/07/2015     Priority: Medium     Diagnosis updated by automated process. Provider to review and confirm.       Allergic rhinitis 05/07/2015     Priority: Medium     Advanced directives, counseling/discussion 10/04/2011     Priority:  Medium     Advance Directive Problem List Overview:   Name Relationship Phone    Primary Health Care Agent            Alternative Health Care Agent        9/4/11 Mailed pt informational letter regarding the [employer] for the development of an Advanced Care Directive. Terrie Zheng RN   Discussed advance care planning with patient; information given to patient to review. 10/4/2011       Advance Care Planning 8/30/2017: ACP Review of Chart / Resources Provided:  Reviewed chart for advance care plan.  Claribel Del Rio has been provided information and resources to begin or update their advance care plan.  Added by Sarah J. Severson           Obesity 11/17/2008     Priority: Medium      Past Medical History:   Diagnosis Date     Abnormal blood find NEC      Acquired absence of organ, genital organs      Depressive disorder, not elsewhere classified      Diabetes (H)      Fibromyalgia      Gastroesophageal reflux disease      History of hormone replacement therapy      Hx musculoskletl dis NEC      Mastodynia      Noninfectious ileitis     IBS     Obesity      Osteoarthrosis, unspecified whether generalized or localized, hand      Other chronic pain      Other extrapyramidal disease and abnormal movement disorder      Pelvic kidney      Personal history of unspecified urinary disorder      Uncomplicated asthma      Unspecified sleep apnea     CPAP used     Past Surgical History:   Procedure Laterality Date     APPENDECTOMY  1992     ARTHROPLASTY KNEE Right 6/7/2016    Procedure: ARTHROPLASTY KNEE;  Surgeon: Jose Alberto Gomez MD;  Location: [facility] OR     CARPAL TUNNEL RELEASE RT/LT  05/02, 08/02    right 5/2002, left 8/2002     COLONOSCOPY  4/2009    Normal; repeat in 10 years     COLONOSCOPY N/A 11/4/2015    Procedure: COLONOSCOPY;  Surgeon: Cathie Melendez MD;  Location:  GI     HYSTERECTOMY TOTAL ABDOMINAL, BILATERAL SALPINGO-OOPHORECTOMY, COMBINED  11/18/1992    Fibroids, endometriosis, Dr Rosas      JOINT REPLACEMENT  4/2010    bilateral thumb     MANOMETRY (ESOPHAGEAL)  10/2007    Normal     PUNC/ASPIR BREAST CYST  11/2004     TONSILLECTOMY  1950     Current Outpatient Prescriptions   Medication Sig Dispense Refill     albuterol (PROAIR HFA/PROVENTIL HFA/VENTOLIN HFA) 108 (90 BASE) MCG/ACT Inhaler Inhale 2 puffs into the lungs every 4 hours as needed for shortness of breath / dyspnea or wheezing 1 Inhaler 5     blood glucose monitoring (EMILY CONTOUR NEXT) test strip Use to test blood sugar 2 times daily or as directed. 100 each 3     blood glucose monitoring (EMILY MICROLET) lancets Use to test blood sugar 2 times daily or as directed.  Ok to substitute alternative if insurance prefers. 100 each 3     montelukast (SINGULAIR) 10 MG tablet TAKE 1 TABLET(10 MG) BY MOUTH AT BEDTIME 90 tablet 2     fluticasone-salmeterol (ADVAIR DISKUS) 500-50 MCG/DOSE diskus inhaler Inhale 1 puff into the lungs 2 times daily 3 Inhaler 3     simvastatin (ZOCOR) 20 MG tablet Take 1 tablet (20 mg) by mouth At Bedtime 90 tablet 1     omeprazole (PRILOSEC) 20 MG CR capsule   0     metFORMIN (GLUCOPHAGE-XR) 500 MG 24 hr tablet Take 1 tablet (500 mg) by mouth daily (with dinner) 90 tablet 3     fexofenadine-pseudoePHEDrine (ALLEGRA-D)  MG per tablet Take 1 tablet by mouth 2 times daily as needed        Omega-3 Fatty Acids (OMEGA-3 FISH OIL PO) Take 1 g by mouth daily        Cholecalciferol (VITAMIN D3 PO) Take 1,000 Units by mouth daily        zolpidem (AMBIEN) 10 MG tablet 1 TABLET AT BEDTIME AS NEEDED 30 tablet 0     escitalopram (LEXAPRO) 20 MG tablet Take 1 tablet (20 mg) by mouth daily 30 tablet 1     OTC products: None, except as noted above    Allergies   Allergen Reactions     Ivp Dye [Contrast Dye] Shortness Of Breath     Claritin [Loratadine]      Hallucinations        Latex Allergy: NO    Social History   Substance Use Topics     Smoking status: Former Smoker     Packs/day: 0.00     Years: 6.00     Types: Cigarettes  "    Quit date: 6/9/1974     Smokeless tobacco: Never Used      Comment: Previous smoker, 1-1.5 packs per week     Alcohol use 2.4 - 3.6 oz/week     4 - 6 Standard drinks or equivalent per week      Comment: 1 drnk monthly     History   Drug Use No       REVIEW OF SYSTEMS:                                                    C: NEGATIVE for fever, chills, change in weight  I: NEGATIVE for worrisome rashes, moles or lesions  E: NEGATIVE for vision changes or irritation  E/M: NEGATIVE for ear, mouth and throat problems  R: NEGATIVE for significant cough or SOB  B: NEGATIVE for masses, tenderness or discharge  CV: NEGATIVE for chest pain, palpitations or peripheral edema  GI: NEGATIVE for nausea, abdominal pain, heartburn, or change in bowel habits  : NEGATIVE for frequency, dysuria, or hematuria  M: NEGATIVE for significant arthralgias or myalgia  N: NEGATIVE for weakness, dizziness or paresthesias  E: NEGATIVE for temperature intolerance, skin/hair changes  H: NEGATIVE for bleeding problems  P: NEGATIVE for changes in mood or affect    EXAM:                                                    /78  Pulse 66  Temp 97  F (36.1  C) (Tympanic)  Ht 5' 3.5\" (1.613 m)  SpO2 98%    GENERAL APPEARANCE: healthy, alert and no distress     EYES: EOMI, PERRL     HENT: ear canals and TM's normal and nose and mouth without ulcers or lesions     NECK: no adenopathy, no asymmetry, masses, or scars and thyroid normal to palpation     RESP: lungs clear to auscultation - no rales, rhonchi or wheezes     CV: regular rates and rhythm, normal S1 S2, no S3 or S4 and no murmur, click or rub     ABDOMEN:  soft, nontender, no HSM or masses and bowel sounds normal     MS: extremities normal- no gross deformities noted, no evidence of inflammation in joints, FROM in all extremities.     SKIN: no suspicious lesions or rashes     NEURO: Normal strength and tone, sensory exam grossly normal, mentation intact and speech normal     PSYCH: " mentation appears normal. and affect normal/bright     LYMPHATICS: No axillary, cervical, or supraclavicular nodes    DIAGNOSTICS:                                                      Results for orders placed or performed in visit on 08/30/17   CBC with platelets differential   Result Value Ref Range    WBC 6.3 4.0 - 11.0 10e9/L    RBC Count 4.56 3.8 - 5.2 10e12/L    Hemoglobin 13.3 11.7 - 15.7 g/dL    Hematocrit 41.4 35.0 - 47.0 %    MCV 91 78 - 100 fl    MCH 29.2 26.5 - 33.0 pg    MCHC 32.1 31.5 - 36.5 g/dL    RDW 13.5 10.0 - 15.0 %    Platelet Count 210 150 - 450 10e9/L    Diff Method Automated Method     % Neutrophils 58.5 %    % Lymphocytes 31.7 %    % Monocytes 6.7 %    % Eosinophils 2.6 %    % Basophils 0.5 %    Absolute Neutrophil 3.7 1.6 - 8.3 10e9/L    Absolute Lymphocytes 2.0 0.8 - 5.3 10e9/L    Absolute Monocytes 0.4 0.0 - 1.3 10e9/L    Absolute Eosinophils 0.2 0.0 - 0.7 10e9/L    Absolute Basophils 0.0 0.0 - 0.2 10e9/L   Basic metabolic panel   Result Value Ref Range    Sodium 144 133 - 144 mmol/L    Potassium 4.1 3.4 - 5.3 mmol/L    Chloride 109 94 - 109 mmol/L    Carbon Dioxide 25 20 - 32 mmol/L    Anion Gap 10 3 - 14 mmol/L    Glucose 119 (H) 70 - 99 mg/dL    Urea Nitrogen 23 7 - 30 mg/dL    Creatinine 0.70 0.52 - 1.04 mg/dL    GFR Estimate 83 >60 mL/min/1.7m2    GFR Estimate If Black >90 >60 mL/min/1.7m2    Calcium 9.0 8.5 - 10.1 mg/dL   Hemoglobin A1c   Result Value Ref Range    Hemoglobin A1C 6.2 (H) 4.3 - 6.0 %       EKG ordered and reviewed myself- NSR with nonspecific T wave changes noted in the lateral leads.     Recent Labs   Lab Test  06/07/17   1447  12/09/16   1149  07/22/16   1050   06/07/16   1838   HGB  13.9   --   13.7   < >   --    PLT   --    --   264   --   206   NA  143  146*  141   --    --    POTASSIUM  4.0  3.9  4.0   --    --    CR  0.65  0.66  0.69   < >  0.68   A1C  6.4*  6.0   --    < >   --     < > = values in this interval not displayed.        IMPRESSION:                                                     Reason for surgery/procedure:COMBINED IRRIGATION AND DEBRIDEMENT KNEE   Diagnosis/reason for consult: Preop exam.    The proposed surgical procedure is considered INTERMEDIATE risk.    REVISED CARDIAC RISK INDEX  The patient has the following serious cardiovascular risks for perioperative complications such as (MI, PE, VFib and 3  AV Block):  No serious cardiac risks  INTERPRETATION: 0 risks: Class I (very low risk - 0.4% complication rate)    The patient has the following additional risks for perioperative complications:  The 10-year ASCVD risk score (Annemariejennie BARNETT Jr, et al., 2013) is: 15%    Values used to calculate the score:      Age: 72 years      Sex: Female      Is Non- : No      Diabetic: Yes      Tobacco smoker: No      Systolic Blood Pressure: 120 mmHg      Is BP treated: No      HDL Cholesterol: 48 mg/dL      Total Cholesterol: 159 mg/dL    1. Preop general physical exam    - CBC with platelets differential  - Basic metabolic panel  - EKG 12-lead complete w/read - Clinics    2. Status post total right knee replacement- with suspected infection.    3. MIRZA (obstructive sleep apnea)- on CPAP  Recommended to take her CPAP machine along, just incase she is admitted to stay in the hospital.   She is asked to contact CPAP clinic to get a new mask for use.    4. Moderate persistent asthma without complication  Not well controlled. Recommended to use albuterol more often. She is not hypoxic and clinically stable, without active wheezing, SOB and there are no signs of lung infection at this time. Symptoms should improve with using CPAP regularly too. PO Steroids are not indicated.   - Asthma Action Plan (AAP)  - albuterol (PROAIR HFA/PROVENTIL HFA/VENTOLIN HFA) 108 (90 BASE) MCG/ACT Inhaler; Inhale 2 puffs into the lungs every 4 hours as needed for shortness of breath / dyspnea or wheezing  Dispense: 1 Inhaler; Refill: 5  F/u in 2-3 weeks recommended.    5. Mild  single current episode of major depressive disorder (H)  Slightly worse, probably due to her worrying about the upcoming surgery.     6. Type 2 diabetes mellitus without complication, without long-term current use of insulin (H)  Well controlled.   - Hemoglobin A1c  - blood glucose monitoring (EMILY CONTOUR NEXT) test strip; Use to test blood sugar 2 times daily or as directed.  Dispense: 100 each; Refill: 3  - blood glucose monitoring (EMILY MICROLET) lancets; Use to test blood sugar 2 times daily or as directed.  Ok to substitute alternative if insurance prefers.  Dispense: 100 each; Refill: 3    7. Advanced directives, counseling/discussion      RECOMMENDATIONS:                                                        DVT prophylaxis recommended.     Obstructive Sleep Apnea (or suspected sleep apnea)  Patient is to bring their home CPAP with them on the day of surgery  Patient is clearly advised to use their home CPAP when released from surgery        --Patient is to take all scheduled medications on the day of surgery EXCEPT for modifications listed below.    Diabetes Medication Use  -----Hold usual  oral diabetic meds (e.g. Metformin, Actos, Glipizide) while NPO.       APPROVAL GIVEN to proceed with proposed procedure, without further diagnostic evaluation       Signed Electronically by: Chata Wallace MD    Copy of this evaluation report is provided to requesting physician.    [place] Preop Guidelines

## 2017-08-30 NOTE — LETTER
August 31, 2017      Claribel Del Rio  [address] [place] [address]        Dear ,    I have reviewed your recent labs. Here are the results:    -Kidney function is normal (Cr, GFR), Sodium is normal, Potassium is normal, Calcium is normal.  -Normal red blood cell (hgb) levels, normal white blood cell count and normal platelet levels.  -A1C (test of diabetes control the last 2-3 months) is at your goal. Please continue with current plan. Also, see me and recheck your A1C test in 6 months.         Resulted Orders   CBC with platelets differential   Result Value Ref Range    WBC 6.3 4.0 - 11.0 10e9/L    RBC Count 4.56 3.8 - 5.2 10e12/L    Hemoglobin 13.3 11.7 - 15.7 g/dL    Hematocrit 41.4 35.0 - 47.0 %    MCV 91 78 - 100 fl    MCH 29.2 26.5 - 33.0 pg    MCHC 32.1 31.5 - 36.5 g/dL    RDW 13.5 10.0 - 15.0 %    Platelet Count 210 150 - 450 10e9/L    Diff Method Automated Method     % Neutrophils 58.5 %    % Lymphocytes 31.7 %    % Monocytes 6.7 %    % Eosinophils 2.6 %    % Basophils 0.5 %    Absolute Neutrophil 3.7 1.6 - 8.3 10e9/L    Absolute Lymphocytes 2.0 0.8 - 5.3 10e9/L    Absolute Monocytes 0.4 0.0 - 1.3 10e9/L    Absolute Eosinophils 0.2 0.0 - 0.7 10e9/L    Absolute Basophils 0.0 0.0 - 0.2 10e9/L   Basic metabolic panel   Result Value Ref Range    Sodium 144 133 - 144 mmol/L    Potassium 4.1 3.4 - 5.3 mmol/L    Chloride 109 94 - 109 mmol/L    Carbon Dioxide 25 20 - 32 mmol/L    Anion Gap 10 3 - 14 mmol/L    Glucose 119 (H) 70 - 99 mg/dL      Comment:      Non Fasting    Urea Nitrogen 23 7 - 30 mg/dL    Creatinine 0.70 0.52 - 1.04 mg/dL    GFR Estimate 83 >60 mL/min/1.7m2      Comment:      Non  GFR Calc    GFR Estimate If Black >90 >60 mL/min/1.7m2      Comment:       GFR Calc    Calcium 9.0 8.5 - 10.1 mg/dL   Hemoglobin A1c   Result Value Ref Range    Hemoglobin A1C 6.2 (H) 4.3 - 6.0 %       If you have any questions or concerns, please call the clinic at the  number listed above.       Sincerely,        Chata Wallace MD

## 2017-08-30 NOTE — MR AVS SNAPSHOT
After Visit Summary   8/30/2017    Claribel Del Rio    MRN: [id number]           Patient Information     Date Of Birth          1945        Visit Information        Provider Department      8/30/2017 11:40 AM Chata Wallace MD [facility] [place]        Today's Diagnoses     Preop general physical exam    -  1    Advanced directives, counseling/discussion        MIRZA (obstructive sleep apnea)- on CPAP        Mild intermittent asthma without complication        Mild single current episode of major depressive disorder (H)        Type 2 diabetes mellitus without complication, without long-term current use of insulin (H)        Status post total right knee replacement        Moderate persistent asthma without complication          Care Instructions      Before Your Surgery      Call your surgeon if there is any change in your health. This includes signs of a cold or flu (such as a sore throat, runny nose, cough, rash or fever).    Do not smoke, drink alcohol or take over the counter medicine (unless your surgeon or primary care doctor tells you to) for the 24 hours before and after surgery.    If you take prescribed drugs: Follow your doctor s orders about which medicines to take and which to stop until after surgery.    Eating and drinking prior to surgery: follow the instructions from your surgeon    Take a shower or bath the night before surgery. Use the soap your surgeon gave you to gently clean your skin. If you do not have soap from your surgeon, use your regular soap. Do not shave or scrub the surgery site.  Wear clean pajamas and have clean sheets on your bed.           Follow-ups after your visit        Follow-up notes from your care team     Return in about 4 weeks (around 9/27/2017) for asthma recheck .      Your next 10 appointments already scheduled     Sep 05, 2017   Procedure with Jose Alberto Gomez MD   [facility]iOp [facility] (--)    [address] [facility]  "[id number]   [phone]            Sep 12, 2017 10:20 AM CDT   Return Visit with Nolan Aguilera PA-C   [facility] ORTHOPEDIC SURGERY ([facility])    [address] [facility] [address]  [facility] [address]   [phone]              Who to contact     If you have questions or need follow up information about today's clinic visit or your schedule please contact [facility] [place] directly at [phone].  Normal or non-critical lab and imaging results will be communicated to you by [employer], letter or phone within 4 business days after the clinic has received the results. If you do not hear from us within 7 days, please contact the clinic through [employer] or phone. If you have a critical or abnormal lab result, we will notify you by phone as soon as possible.  Submit refill requests through [employer] or call your pharmacy and they will forward the refill request to us. Please allow 3 business days for your refill to be completed.          Additional Information About Your Visit        [employer] Information     [employer] lets you send messages to your doctor, view your test results, renew your prescriptions, schedule appointments and more. To sign up, go to [email or web address] . Click on \"Log in\" on the left side of the screen, which will take you to the Welcome page. Then click on \"Sign up Now\" on the right side of the page.     You will be asked to enter the access code listed below, as well as some personal information. Please follow the directions to create your username and password.     Your access code is: [email or web address]  Expires: 2017  2:21 PM     Your access code will  in 90 days. If you need help or a new code, please call your [facility] clinic or [phone].        Care EveryWhere ID     This is your Care EveryWhere ID. This could be used by other organizations to access your [facility] medical records  [id number]        Your Vitals Were     Pulse " "Temperature Height Pulse Oximetry          66 97  F (36.1  C) (Tympanic) 5' 3.5\" (1.613 m) 98%         Blood Pressure from Last 3 Encounters:   08/30/17 120/78   08/21/17 128/78   06/07/17 98/60    Weight from Last 3 Encounters:   08/21/17 180 lb (81.6 kg)   06/07/17 188 lb 12.8 oz (85.6 kg)   12/09/16 184 lb (83.5 kg)              We Performed the Following     Asthma Action Plan (AAP)     Basic metabolic panel     CBC with platelets differential     Hemoglobin A1c          Today's Medication Changes          These changes are accurate as of: 8/30/17 12:15 PM.  If you have any questions, ask your nurse or doctor.               Stop taking these medicines if you haven't already. Please contact your care team if you have questions.     fluticasone 50 MCG/ACT spray   Commonly known as:  FLONASE   Stopped by:  Chata Wallace MD                Where to get your medicines      These medications were sent to [facility] [id number]  [place] - [address]   [address] [place] [address]     Phone:  [phone]     albuterol 108 (90 BASE) MCG/ACT Inhaler                Primary Care Provider Office Phone # Fax #    Chata Wallace [phone]       [address]  [place] [address]        Equal Access to Services     [facility] AH: Hadii aad ku hadasho Soomaali, waaxda luqadaha, qaybta kaalmada adeegyada, waxay bernadette bright adeanita becerra . So [facility] [phone].    ATENCIÓN: Si habla español, tiene a schaeffer disposición servicios gratuitos de asistencia lingüística. Llame al [phone].    We comply with applicable federal civil rights laws and [place] laws. We do not discriminate on the basis of race, color, national origin, age, disability sex, sexual orientation or gender identity.            Thank you!     Thank you for choosing [facility] [place]  for your care. Our goal is always to provide you with excellent care. Hearing back " from our patients is one way we can continue to improve our services. Please take a few minutes to complete the written survey that you may receive in the mail after your visit with us. Thank you!             Your Updated Medication List - Protect others around you: Learn how to safely use, store and throw away your medicines at www.disposemymeds.org.          This list is accurate as of: 8/30/17 12:15 PM.  Always use your most recent med list.                   Brand Name Dispense Instructions for use Diagnosis    albuterol 108 (90 BASE) MCG/ACT Inhaler    PROAIR HFA/PROVENTIL HFA/VENTOLIN HFA    1 Inhaler    Inhale 2 puffs into the lungs every 4 hours as needed for shortness of breath / dyspnea or wheezing    Moderate persistent asthma without complication, Mild intermittent asthma without complication       escitalopram 20 MG tablet    LEXAPRO    30 tablet    Take 1 tablet (20 mg) by mouth daily    Major depression       fexofenadine-pseudoePHEDrine  MG per 12 hr tablet    ALLEGRA-D     Take 1 tablet by mouth 2 times daily        fluticasone-salmeterol 500-50 MCG/DOSE diskus inhaler    ADVAIR DISKUS    3 Inhaler    Inhale 1 puff into the lungs 2 times daily    Moderate persistent asthma without complication       metFORMIN 500 MG 24 hr tablet    GLUCOPHAGE-XR    90 tablet    Take 1 tablet (500 mg) by mouth daily (with dinner)    Type 2 diabetes mellitus without complication (H)       montelukast 10 MG tablet    SINGULAIR    90 tablet    TAKE 1 TABLET(10 MG) BY MOUTH AT BEDTIME    Moderate persistent asthma without complication       OMEGA-3 FISH OIL PO      Take 1 g by mouth daily        omeprazole 20 MG CR capsule    priLOSEC          simvastatin 20 MG tablet    ZOCOR    90 tablet    Take 1 tablet (20 mg) by mouth At Bedtime    Hyperlipidemia LDL goal <100       VITAMIN D3 PO      Take 1,000 Units by mouth daily        zolpidem 10 MG tablet    AMBIEN    30 tablet    1 TABLET AT BEDTIME AS NEEDED     Insomnia

## 2017-08-30 NOTE — NURSING NOTE
"Chief Complaint   Patient presents with     Pre-Op Exam       Initial /78  Pulse 66  Temp 97  F (36.1  C) (Tympanic)  Ht 5' 3.5\" (1.613 m)  SpO2 98% Estimated body mass index is 31.39 kg/(m^2) as calculated from the following:    Height as of 8/21/17: 5' 3.5\" (1.613 m).    Weight as of 8/21/17: 180 lb (81.6 kg).  Medication Reconciliation: complete  "

## 2017-08-31 LAB
ANION GAP SERPL CALCULATED.3IONS-SCNC: 10 MMOL/L (ref 3–14)
BUN SERPL-MCNC: 23 MG/DL (ref 7–30)
CALCIUM SERPL-MCNC: 9 MG/DL (ref 8.5–10.1)
CHLORIDE SERPL-SCNC: 109 MMOL/L (ref 94–109)
CO2 SERPL-SCNC: 25 MMOL/L (ref 20–32)
CREAT SERPL-MCNC: 0.7 MG/DL (ref 0.52–1.04)
GFR SERPL CREATININE-BSD FRML MDRD: 83 ML/MIN/1.7M2
GLUCOSE SERPL-MCNC: 119 MG/DL (ref 70–99)
POTASSIUM SERPL-SCNC: 4.1 MMOL/L (ref 3.4–5.3)
SODIUM SERPL-SCNC: 144 MMOL/L (ref 133–144)

## 2017-08-31 ASSESSMENT — ASTHMA QUESTIONNAIRES: ACT_TOTALSCORE: 14

## 2017-08-31 ASSESSMENT — ANXIETY QUESTIONNAIRES: GAD7 TOTAL SCORE: 6

## 2017-09-01 NOTE — H&P (VIEW-ONLY)
[facility]  [address] [address]  [phone]  Dept: [phone]    PRE-OP EVALUATION:  Today's date: 2017    Claribel Del Rio (: 1945) presents for pre-operative evaluation assessment as requested by Dr. Jose lAberto Delacruz.  She requires evaluation and anesthesia risk assessment prior to undergoing surgery/procedure.    Proposed procedure: COMBINED IRRIGATION AND DEBRIDEMENT KNEE    Date of Surgery/ Procedure: 17  Time of Surgery/ Procedure: 3:20pm  Hospital/Surgical Facility: [facility]  Fax number for surgical facility: [facility]  Primary Physician: Chata Wallace  Type of Anesthesia Anticipated: to be determined    Patient has a Health Care Directive or Living Will:  NO    1. NO - Do you have a history of heart attack, stroke, stent, bypass or surgery on an artery in the head, neck, heart or legs?  2. NO- Do you ever have any pain or discomfort in your chest?  3. NO - Do you have a history of  Heart Failure?  4. YES due to asthma - Are you troubled by shortness of breath when: walking on the level, up a slight hill or at night?  5. NO - Do you currently have a cold, bronchitis or other respiratory infection?  6. YES - Do you have a cough, shortness of breath or wheezing?  7. NO - Do you sometimes get pains in the calves of your legs when you walk?  8. NO - Do you or anyone in your family have previous history of blood clots?  9. NO - Do you or does anyone in your family have a serious bleeding problem such as prolonged bleeding following surgeries or cuts?  10. NO - Have you ever had problems with anemia or been told to take iron pills?  11. NO - Have you had any abnormal blood loss such as black, tarry or bloody stools, or abnormal vaginal bleeding?  12. NO - Have you ever had a blood transfusion?  13. NO - Have you or any of your relatives ever had problems with anesthesia?  14. YES Sleep apena - Do you have sleep apnea, excessive snoring or  daytime drowsiness? Does not use CPAP regularly as the mask does not fit well.   15. NO - Do you have any prosthetic heart valves?  16. YES Right knee and both thumb joints - Do you have prosthetic joints?  17. NO - Is there any chance that you may be pregnant?        HPI:                                                      Brief HPI related to upcoming procedure: COMBINED IRRIGATION AND DEBRIDEMENT KNEE. S/p knee replacement. Recently was seen for evaluation of pain in the knee and had a bone scan, which shows suspicion for infection.       DIABETES - Patient has a longstanding history of DiabetesType Type II . Patient is being treated with oral agents and denies significant side effects. Control has been good.       Lab Results   Component Value Date    A1C 6.2 08/30/2017                                                                                                           .  HYPERLIPIDEMIA - Patient has a long history of significant Hyperlipidemia requiring medication for treatment with recent good control. Patient reports no problems or side effects with the medication.      LDL Cholesterol Calculated   Date Value Ref Range Status   12/09/2016 83 <100 mg/dL Final     Comment:     Desirable:       <100 mg/dl                                                                                                                                                        .  DEPRESSION - Patient has a long history of Depression of moderate severity requiring medication for control with recent symptoms slightly worse. Worried about her up coming knee surgery.    PHQ-9 score:    PHQ-9 SCORE 12/9/2016 6/7/2017 8/30/2017   Total Score - - -   Total Score 3 1 9                                                                                                                                                                                        .  ASTHMA - Patient has a longstanding history of  Asthma . Recently with the weather  changing, her symptoms have gotten worse.   Using Advair 500/50, 1 puff BID, Singulair 10 mg QD, Allegra-D daily and albuterol prn. She has not been using albuterol much these days.    ACT Total Scores 12/9/2016 6/7/2017 8/30/2017   ACT TOTAL SCORE - - -   ASTHMA ER VISITS - - -   ASTHMA HOSPITALIZATIONS - - -   ACT TOTAL SCORE (Goal Greater than or Equal to 20) 22 23 14   In the past 12 months, how many times did you visit the emergency room for your asthma without being admitted to the hospital? 0 0 0   In the past 12 months, how many times were you hospitalized overnight because of your asthma? 0 0 0                                                                                                                                         .  SLEEP PROBLEM - Patient has a longstanding history of MIRZA. Not using her CPAP regularly as she needs a new mask.                                                                                                                                  .    MEDICAL HISTORY:                                                    Patient Active Problem List    Diagnosis Date Noted     Fibromyalgia 12/20/2010     Priority: High     Osteopenia 08/08/2016     Priority: Medium     Type 2 diabetes mellitus without complication (H) 07/22/2016     Priority: Medium     Hyperlipidemia LDL goal <100 07/22/2016     Priority: Medium     Mild single current episode of major depressive disorder (H) 07/22/2016     Priority: Medium     Status post total right knee replacement 06/13/2016     Priority: Medium     Mild intermittent asthma without complication 05/20/2016     Priority: Medium     MIRZA (obstructive sleep apnea)- on CPAP 01/15/2016     Priority: Medium     Low back pain 08/07/2015     Priority: Medium     Diagnosis updated by automated process. Provider to review and confirm.       Allergic rhinitis 05/07/2015     Priority: Medium     Advanced directives, counseling/discussion 10/04/2011     Priority:  Medium     Advance Directive Problem List Overview:   Name Relationship Phone    Primary Health Care Agent            Alternative Health Care Agent        9/4/11 Mailed pt informational letter regarding the [employer] for the development of an Advanced Care Directive. Terrie Zheng RN   Discussed advance care planning with patient; information given to patient to review. 10/4/2011       Advance Care Planning 8/30/2017: ACP Review of Chart / Resources Provided:  Reviewed chart for advance care plan.  Claribel Del Rio has been provided information and resources to begin or update their advance care plan.  Added by Sarah J. Severson           Obesity 11/17/2008     Priority: Medium      Past Medical History:   Diagnosis Date     Abnormal blood find NEC      Acquired absence of organ, genital organs      Depressive disorder, not elsewhere classified      Diabetes (H)      Fibromyalgia      Gastroesophageal reflux disease      History of hormone replacement therapy      Hx musculoskletl dis NEC      Mastodynia      Noninfectious ileitis     IBS     Obesity      Osteoarthrosis, unspecified whether generalized or localized, hand      Other chronic pain      Other extrapyramidal disease and abnormal movement disorder      Pelvic kidney      Personal history of unspecified urinary disorder      Uncomplicated asthma      Unspecified sleep apnea     CPAP used     Past Surgical History:   Procedure Laterality Date     APPENDECTOMY  1992     ARTHROPLASTY KNEE Right 6/7/2016    Procedure: ARTHROPLASTY KNEE;  Surgeon: Jose Alberto Gomez MD;  Location: [facility] OR     CARPAL TUNNEL RELEASE RT/LT  05/02, 08/02    right 5/2002, left 8/2002     COLONOSCOPY  4/2009    Normal; repeat in 10 years     COLONOSCOPY N/A 11/4/2015    Procedure: COLONOSCOPY;  Surgeon: Cathie Melendez MD;  Location:  GI     HYSTERECTOMY TOTAL ABDOMINAL, BILATERAL SALPINGO-OOPHORECTOMY, COMBINED  11/18/1992    Fibroids, endometriosis, Dr Rosas      JOINT REPLACEMENT  4/2010    bilateral thumb     MANOMETRY (ESOPHAGEAL)  10/2007    Normal     PUNC/ASPIR BREAST CYST  11/2004     TONSILLECTOMY  1950     Current Outpatient Prescriptions   Medication Sig Dispense Refill     albuterol (PROAIR HFA/PROVENTIL HFA/VENTOLIN HFA) 108 (90 BASE) MCG/ACT Inhaler Inhale 2 puffs into the lungs every 4 hours as needed for shortness of breath / dyspnea or wheezing 1 Inhaler 5     blood glucose monitoring (EMILY CONTOUR NEXT) test strip Use to test blood sugar 2 times daily or as directed. 100 each 3     blood glucose monitoring (EMILY MICROLET) lancets Use to test blood sugar 2 times daily or as directed.  Ok to substitute alternative if insurance prefers. 100 each 3     montelukast (SINGULAIR) 10 MG tablet TAKE 1 TABLET(10 MG) BY MOUTH AT BEDTIME 90 tablet 2     fluticasone-salmeterol (ADVAIR DISKUS) 500-50 MCG/DOSE diskus inhaler Inhale 1 puff into the lungs 2 times daily 3 Inhaler 3     simvastatin (ZOCOR) 20 MG tablet Take 1 tablet (20 mg) by mouth At Bedtime 90 tablet 1     omeprazole (PRILOSEC) 20 MG CR capsule   0     metFORMIN (GLUCOPHAGE-XR) 500 MG 24 hr tablet Take 1 tablet (500 mg) by mouth daily (with dinner) 90 tablet 3     fexofenadine-pseudoePHEDrine (ALLEGRA-D)  MG per tablet Take 1 tablet by mouth 2 times daily as needed        Omega-3 Fatty Acids (OMEGA-3 FISH OIL PO) Take 1 g by mouth daily        Cholecalciferol (VITAMIN D3 PO) Take 1,000 Units by mouth daily        zolpidem (AMBIEN) 10 MG tablet 1 TABLET AT BEDTIME AS NEEDED 30 tablet 0     escitalopram (LEXAPRO) 20 MG tablet Take 1 tablet (20 mg) by mouth daily 30 tablet 1     OTC products: None, except as noted above    Allergies   Allergen Reactions     Ivp Dye [Contrast Dye] Shortness Of Breath     Claritin [Loratadine]      Hallucinations        Latex Allergy: NO    Social History   Substance Use Topics     Smoking status: Former Smoker     Packs/day: 0.00     Years: 6.00     Types: Cigarettes  "    Quit date: 6/9/1974     Smokeless tobacco: Never Used      Comment: Previous smoker, 1-1.5 packs per week     Alcohol use 2.4 - 3.6 oz/week     4 - 6 Standard drinks or equivalent per week      Comment: 1 drnk monthly     History   Drug Use No       REVIEW OF SYSTEMS:                                                    C: NEGATIVE for fever, chills, change in weight  I: NEGATIVE for worrisome rashes, moles or lesions  E: NEGATIVE for vision changes or irritation  E/M: NEGATIVE for ear, mouth and throat problems  R: NEGATIVE for significant cough or SOB  B: NEGATIVE for masses, tenderness or discharge  CV: NEGATIVE for chest pain, palpitations or peripheral edema  GI: NEGATIVE for nausea, abdominal pain, heartburn, or change in bowel habits  : NEGATIVE for frequency, dysuria, or hematuria  M: NEGATIVE for significant arthralgias or myalgia  N: NEGATIVE for weakness, dizziness or paresthesias  E: NEGATIVE for temperature intolerance, skin/hair changes  H: NEGATIVE for bleeding problems  P: NEGATIVE for changes in mood or affect    EXAM:                                                    /78  Pulse 66  Temp 97  F (36.1  C) (Tympanic)  Ht 5' 3.5\" (1.613 m)  SpO2 98%    GENERAL APPEARANCE: healthy, alert and no distress     EYES: EOMI, PERRL     HENT: ear canals and TM's normal and nose and mouth without ulcers or lesions     NECK: no adenopathy, no asymmetry, masses, or scars and thyroid normal to palpation     RESP: lungs clear to auscultation - no rales, rhonchi or wheezes     CV: regular rates and rhythm, normal S1 S2, no S3 or S4 and no murmur, click or rub     ABDOMEN:  soft, nontender, no HSM or masses and bowel sounds normal     MS: extremities normal- no gross deformities noted, no evidence of inflammation in joints, FROM in all extremities.     SKIN: no suspicious lesions or rashes     NEURO: Normal strength and tone, sensory exam grossly normal, mentation intact and speech normal     PSYCH: " mentation appears normal. and affect normal/bright     LYMPHATICS: No axillary, cervical, or supraclavicular nodes    DIAGNOSTICS:                                                      Results for orders placed or performed in visit on 08/30/17   CBC with platelets differential   Result Value Ref Range    WBC 6.3 4.0 - 11.0 10e9/L    RBC Count 4.56 3.8 - 5.2 10e12/L    Hemoglobin 13.3 11.7 - 15.7 g/dL    Hematocrit 41.4 35.0 - 47.0 %    MCV 91 78 - 100 fl    MCH 29.2 26.5 - 33.0 pg    MCHC 32.1 31.5 - 36.5 g/dL    RDW 13.5 10.0 - 15.0 %    Platelet Count 210 150 - 450 10e9/L    Diff Method Automated Method     % Neutrophils 58.5 %    % Lymphocytes 31.7 %    % Monocytes 6.7 %    % Eosinophils 2.6 %    % Basophils 0.5 %    Absolute Neutrophil 3.7 1.6 - 8.3 10e9/L    Absolute Lymphocytes 2.0 0.8 - 5.3 10e9/L    Absolute Monocytes 0.4 0.0 - 1.3 10e9/L    Absolute Eosinophils 0.2 0.0 - 0.7 10e9/L    Absolute Basophils 0.0 0.0 - 0.2 10e9/L   Basic metabolic panel   Result Value Ref Range    Sodium 144 133 - 144 mmol/L    Potassium 4.1 3.4 - 5.3 mmol/L    Chloride 109 94 - 109 mmol/L    Carbon Dioxide 25 20 - 32 mmol/L    Anion Gap 10 3 - 14 mmol/L    Glucose 119 (H) 70 - 99 mg/dL    Urea Nitrogen 23 7 - 30 mg/dL    Creatinine 0.70 0.52 - 1.04 mg/dL    GFR Estimate 83 >60 mL/min/1.7m2    GFR Estimate If Black >90 >60 mL/min/1.7m2    Calcium 9.0 8.5 - 10.1 mg/dL   Hemoglobin A1c   Result Value Ref Range    Hemoglobin A1C 6.2 (H) 4.3 - 6.0 %       EKG ordered and reviewed myself- NSR with nonspecific T wave changes noted in the lateral leads.     Recent Labs   Lab Test  06/07/17   1447  12/09/16   1149  07/22/16   1050   06/07/16   1838   HGB  13.9   --   13.7   < >   --    PLT   --    --   264   --   206   NA  143  146*  141   --    --    POTASSIUM  4.0  3.9  4.0   --    --    CR  0.65  0.66  0.69   < >  0.68   A1C  6.4*  6.0   --    < >   --     < > = values in this interval not displayed.        IMPRESSION:                                                     Reason for surgery/procedure:COMBINED IRRIGATION AND DEBRIDEMENT KNEE   Diagnosis/reason for consult: Preop exam.    The proposed surgical procedure is considered INTERMEDIATE risk.    REVISED CARDIAC RISK INDEX  The patient has the following serious cardiovascular risks for perioperative complications such as (MI, PE, VFib and 3  AV Block):  No serious cardiac risks  INTERPRETATION: 0 risks: Class I (very low risk - 0.4% complication rate)    The patient has the following additional risks for perioperative complications:  The 10-year ASCVD risk score (Annemariejennie BARNETT Jr, et al., 2013) is: 15%    Values used to calculate the score:      Age: 72 years      Sex: Female      Is Non- : No      Diabetic: Yes      Tobacco smoker: No      Systolic Blood Pressure: 120 mmHg      Is BP treated: No      HDL Cholesterol: 48 mg/dL      Total Cholesterol: 159 mg/dL    1. Preop general physical exam    - CBC with platelets differential  - Basic metabolic panel  - EKG 12-lead complete w/read - Clinics    2. Status post total right knee replacement- with suspected infection.    3. MIRZA (obstructive sleep apnea)- on CPAP  Recommended to take her CPAP machine along, just incase she is admitted to stay in the hospital.   She is asked to contact CPAP clinic to get a new mask for use.    4. Moderate persistent asthma without complication  Not well controlled. Recommended to use albuterol more often. She is not hypoxic and clinically stable, without active wheezing, SOB and there are no signs of lung infection at this time. Symptoms should improve with using CPAP regularly too. PO Steroids are not indicated.   - Asthma Action Plan (AAP)  - albuterol (PROAIR HFA/PROVENTIL HFA/VENTOLIN HFA) 108 (90 BASE) MCG/ACT Inhaler; Inhale 2 puffs into the lungs every 4 hours as needed for shortness of breath / dyspnea or wheezing  Dispense: 1 Inhaler; Refill: 5  F/u in 2-3 weeks recommended.    5. Mild  single current episode of major depressive disorder (H)  Slightly worse, probably due to her worrying about the upcoming surgery.     6. Type 2 diabetes mellitus without complication, without long-term current use of insulin (H)  Well controlled.   - Hemoglobin A1c  - blood glucose monitoring (EMILY CONTOUR NEXT) test strip; Use to test blood sugar 2 times daily or as directed.  Dispense: 100 each; Refill: 3  - blood glucose monitoring (EMILY MICROLET) lancets; Use to test blood sugar 2 times daily or as directed.  Ok to substitute alternative if insurance prefers.  Dispense: 100 each; Refill: 3    7. Advanced directives, counseling/discussion      RECOMMENDATIONS:                                                        DVT prophylaxis recommended.     Obstructive Sleep Apnea (or suspected sleep apnea)  Patient is to bring their home CPAP with them on the day of surgery  Patient is clearly advised to use their home CPAP when released from surgery        --Patient is to take all scheduled medications on the day of surgery EXCEPT for modifications listed below.    Diabetes Medication Use  -----Hold usual  oral diabetic meds (e.g. Metformin, Actos, Glipizide) while NPO.       APPROVAL GIVEN to proceed with proposed procedure, without further diagnostic evaluation       Signed Electronically by: Chata Wallace MD    Copy of this evaluation report is provided to requesting physician.    [place] Preop Guidelines

## 2017-09-04 DIAGNOSIS — E11.9 TYPE 2 DIABETES MELLITUS WITHOUT COMPLICATION (H): ICD-10-CM

## 2017-09-05 ENCOUNTER — HOSPITAL ENCOUNTER (OUTPATIENT)
Facility: CLINIC | Age: 72
Discharge: HOME | End: 2017-09-05
Attending: ORTHOPAEDIC SURGERY | Admitting: ORTHOPAEDIC SURGERY
Payer: MEDICARE

## 2017-09-05 ENCOUNTER — ANESTHESIA EVENT (OUTPATIENT)
Dept: SURGERY | Facility: CLINIC | Age: 72
End: 2017-09-05
Payer: MEDICARE

## 2017-09-05 ENCOUNTER — ANESTHESIA (OUTPATIENT)
Dept: SURGERY | Facility: CLINIC | Age: 72
End: 2017-09-05
Payer: MEDICARE

## 2017-09-05 VITALS
TEMPERATURE: 98.1 F | HEIGHT: 63 IN | OXYGEN SATURATION: 98 % | DIASTOLIC BLOOD PRESSURE: 74 MMHG | RESPIRATION RATE: 14 BRPM | SYSTOLIC BLOOD PRESSURE: 123 MMHG

## 2017-09-05 DIAGNOSIS — G89.18 POST-OP PAIN: Primary | ICD-10-CM

## 2017-09-05 LAB
GLUCOSE BLDC GLUCOMTR-MCNC: 129 MG/DL (ref 70–99)
GLUCOSE BLDC GLUCOMTR-MCNC: 130 MG/DL (ref 70–99)

## 2017-09-05 PROCEDURE — 27210794 ZZH OR GENERAL SUPPLY STERILE: Performed by: ORTHOPAEDIC SURGERY

## 2017-09-05 PROCEDURE — A9270 NON-COVERED ITEM OR SERVICE: HCPCS | Mod: GY | Performed by: ORTHOPAEDIC SURGERY

## 2017-09-05 PROCEDURE — 87176 TISSUE HOMOGENIZATION CULTR: CPT | Performed by: ORTHOPAEDIC SURGERY

## 2017-09-05 PROCEDURE — 87075 CULTR BACTERIA EXCEPT BLOOD: CPT | Performed by: ORTHOPAEDIC SURGERY

## 2017-09-05 PROCEDURE — 87186 SC STD MICRODIL/AGAR DIL: CPT | Performed by: ORTHOPAEDIC SURGERY

## 2017-09-05 PROCEDURE — 25000125 ZZHC RX 250: Performed by: ORTHOPAEDIC SURGERY

## 2017-09-05 PROCEDURE — 25800025 ZZH RX 258: Performed by: ORTHOPAEDIC SURGERY

## 2017-09-05 PROCEDURE — 71000027 ZZH RECOVERY PHASE 2 EACH 15 MINS: Performed by: ORTHOPAEDIC SURGERY

## 2017-09-05 PROCEDURE — S0020 INJECTION, BUPIVICAINE HYDRO: HCPCS | Performed by: ORTHOPAEDIC SURGERY

## 2017-09-05 PROCEDURE — 27110028 ZZH OR GENERAL SUPPLY NON-STERILE: Performed by: ORTHOPAEDIC SURGERY

## 2017-09-05 PROCEDURE — 71000012 ZZH RECOVERY PHASE 1 LEVEL 1 FIRST HR: Performed by: ORTHOPAEDIC SURGERY

## 2017-09-05 PROCEDURE — 25000128 H RX IP 250 OP 636: Performed by: ANESTHESIOLOGY

## 2017-09-05 PROCEDURE — 71000013 ZZH RECOVERY PHASE 1 LEVEL 1 EA ADDTL HR: Performed by: ORTHOPAEDIC SURGERY

## 2017-09-05 PROCEDURE — 25000128 H RX IP 250 OP 636: Performed by: NURSE ANESTHETIST, CERTIFIED REGISTERED

## 2017-09-05 PROCEDURE — 40000306 ZZH STATISTIC PRE PROC ASSESS II: Performed by: ORTHOPAEDIC SURGERY

## 2017-09-05 PROCEDURE — 37000009 ZZH ANESTHESIA TECHNICAL FEE, EACH ADDTL 15 MIN: Performed by: ORTHOPAEDIC SURGERY

## 2017-09-05 PROCEDURE — 36000058 ZZH SURGERY LEVEL 3 EA 15 ADDTL MIN: Performed by: ORTHOPAEDIC SURGERY

## 2017-09-05 PROCEDURE — 27810325 ZZHC OR IMPLANT OTHER OPNP: Performed by: ORTHOPAEDIC SURGERY

## 2017-09-05 PROCEDURE — 36000056 ZZH SURGERY LEVEL 3 1ST 30 MIN: Performed by: ORTHOPAEDIC SURGERY

## 2017-09-05 PROCEDURE — 27486 REVISE/REPLACE KNEE JOINT: CPT | Mod: 52 | Performed by: ORTHOPAEDIC SURGERY

## 2017-09-05 PROCEDURE — 87070 CULTURE OTHR SPECIMN AEROBIC: CPT | Performed by: ORTHOPAEDIC SURGERY

## 2017-09-05 PROCEDURE — 87077 CULTURE AEROBIC IDENTIFY: CPT | Performed by: ORTHOPAEDIC SURGERY

## 2017-09-05 PROCEDURE — 25000566 ZZH SEVOFLURANE, EA 15 MIN: Performed by: ORTHOPAEDIC SURGERY

## 2017-09-05 PROCEDURE — 82962 GLUCOSE BLOOD TEST: CPT

## 2017-09-05 PROCEDURE — 25000128 H RX IP 250 OP 636: Performed by: ORTHOPAEDIC SURGERY

## 2017-09-05 PROCEDURE — 37000008 ZZH ANESTHESIA TECHNICAL FEE, 1ST 30 MIN: Performed by: ORTHOPAEDIC SURGERY

## 2017-09-05 PROCEDURE — 25000125 ZZHC RX 250: Performed by: NURSE ANESTHETIST, CERTIFIED REGISTERED

## 2017-09-05 PROCEDURE — 87076 CULTURE ANAEROBE IDENT EACH: CPT | Performed by: ORTHOPAEDIC SURGERY

## 2017-09-05 PROCEDURE — 25000132 ZZH RX MED GY IP 250 OP 250 PS 637: Mod: GY | Performed by: ORTHOPAEDIC SURGERY

## 2017-09-05 DEVICE — IMPLANTABLE DEVICE: Type: IMPLANTABLE DEVICE | Site: KNEE | Status: FUNCTIONAL

## 2017-09-05 RX ORDER — MEPERIDINE HYDROCHLORIDE 25 MG/ML
12.5 INJECTION INTRAMUSCULAR; INTRAVENOUS; SUBCUTANEOUS
Status: DISCONTINUED | OUTPATIENT
Start: 2017-09-05 | End: 2017-09-05 | Stop reason: HOSPADM

## 2017-09-05 RX ORDER — ACETAMINOPHEN 325 MG/1
975 TABLET ORAL ONCE
Status: COMPLETED | OUTPATIENT
Start: 2017-09-05 | End: 2017-09-05

## 2017-09-05 RX ORDER — FENTANYL CITRATE 50 UG/ML
25-50 INJECTION, SOLUTION INTRAMUSCULAR; INTRAVENOUS
Status: DISCONTINUED | OUTPATIENT
Start: 2017-09-05 | End: 2017-09-05 | Stop reason: HOSPADM

## 2017-09-05 RX ORDER — MORPHINE SULFATE 1 MG/ML
INJECTION, SOLUTION EPIDURAL; INTRATHECAL; INTRAVENOUS PRN
Status: DISCONTINUED | OUTPATIENT
Start: 2017-09-05 | End: 2017-09-05 | Stop reason: HOSPADM

## 2017-09-05 RX ORDER — SODIUM CHLORIDE, SODIUM LACTATE, POTASSIUM CHLORIDE, CALCIUM CHLORIDE 600; 310; 30; 20 MG/100ML; MG/100ML; MG/100ML; MG/100ML
INJECTION, SOLUTION INTRAVENOUS CONTINUOUS
Status: DISCONTINUED | OUTPATIENT
Start: 2017-09-05 | End: 2017-09-05 | Stop reason: HOSPADM

## 2017-09-05 RX ORDER — OXYCODONE AND ACETAMINOPHEN 5; 325 MG/1; MG/1
1-2 TABLET ORAL
Status: COMPLETED | OUTPATIENT
Start: 2017-09-05 | End: 2017-09-05

## 2017-09-05 RX ORDER — LIDOCAINE 40 MG/G
CREAM TOPICAL
Status: DISCONTINUED | OUTPATIENT
Start: 2017-09-05 | End: 2017-09-05 | Stop reason: HOSPADM

## 2017-09-05 RX ORDER — PROPOFOL 10 MG/ML
INJECTION, EMULSION INTRAVENOUS PRN
Status: DISCONTINUED | OUTPATIENT
Start: 2017-09-05 | End: 2017-09-05

## 2017-09-05 RX ORDER — ONDANSETRON 2 MG/ML
4 INJECTION INTRAMUSCULAR; INTRAVENOUS EVERY 30 MIN PRN
Status: DISCONTINUED | OUTPATIENT
Start: 2017-09-05 | End: 2017-09-05 | Stop reason: HOSPADM

## 2017-09-05 RX ORDER — HYDROMORPHONE HYDROCHLORIDE 1 MG/ML
.3-.5 INJECTION, SOLUTION INTRAMUSCULAR; INTRAVENOUS; SUBCUTANEOUS EVERY 10 MIN PRN
Status: DISCONTINUED | OUTPATIENT
Start: 2017-09-05 | End: 2017-09-05 | Stop reason: HOSPADM

## 2017-09-05 RX ORDER — LIDOCAINE HYDROCHLORIDE 10 MG/ML
INJECTION, SOLUTION INFILTRATION; PERINEURAL PRN
Status: DISCONTINUED | OUTPATIENT
Start: 2017-09-05 | End: 2017-09-05

## 2017-09-05 RX ORDER — OXYCODONE AND ACETAMINOPHEN 5; 325 MG/1; MG/1
1-2 TABLET ORAL EVERY 6 HOURS PRN
Qty: 40 TABLET | Refills: 0 | Status: SHIPPED | OUTPATIENT
Start: 2017-09-05 | End: 2017-12-08

## 2017-09-05 RX ORDER — DEXAMETHASONE SODIUM PHOSPHATE 4 MG/ML
INJECTION, SOLUTION INTRA-ARTICULAR; INTRALESIONAL; INTRAMUSCULAR; INTRAVENOUS; SOFT TISSUE PRN
Status: DISCONTINUED | OUTPATIENT
Start: 2017-09-05 | End: 2017-09-05

## 2017-09-05 RX ORDER — NALOXONE HYDROCHLORIDE 0.4 MG/ML
.1-.4 INJECTION, SOLUTION INTRAMUSCULAR; INTRAVENOUS; SUBCUTANEOUS
Status: DISCONTINUED | OUTPATIENT
Start: 2017-09-05 | End: 2017-09-05 | Stop reason: HOSPADM

## 2017-09-05 RX ORDER — GLYCOPYRROLATE 0.2 MG/ML
INJECTION, SOLUTION INTRAMUSCULAR; INTRAVENOUS PRN
Status: DISCONTINUED | OUTPATIENT
Start: 2017-09-05 | End: 2017-09-05

## 2017-09-05 RX ORDER — FENTANYL CITRATE 50 UG/ML
INJECTION, SOLUTION INTRAMUSCULAR; INTRAVENOUS PRN
Status: DISCONTINUED | OUTPATIENT
Start: 2017-09-05 | End: 2017-09-05

## 2017-09-05 RX ORDER — CEFAZOLIN SODIUM 2 G/100ML
2 INJECTION, SOLUTION INTRAVENOUS
Status: COMPLETED | OUTPATIENT
Start: 2017-09-05 | End: 2017-09-05

## 2017-09-05 RX ORDER — CHLORHEXIDINE GLUCONATE 40 MG/ML
SOLUTION TOPICAL ONCE
Status: DISCONTINUED | OUTPATIENT
Start: 2017-09-05 | End: 2017-09-05 | Stop reason: HOSPADM

## 2017-09-05 RX ORDER — CEFAZOLIN SODIUM 1 G/3ML
1 INJECTION, POWDER, FOR SOLUTION INTRAMUSCULAR; INTRAVENOUS SEE ADMIN INSTRUCTIONS
Status: DISCONTINUED | OUTPATIENT
Start: 2017-09-05 | End: 2017-09-05 | Stop reason: HOSPADM

## 2017-09-05 RX ORDER — ONDANSETRON 4 MG/1
4 TABLET, ORALLY DISINTEGRATING ORAL EVERY 30 MIN PRN
Status: DISCONTINUED | OUTPATIENT
Start: 2017-09-05 | End: 2017-09-05 | Stop reason: HOSPADM

## 2017-09-05 RX ORDER — OXYCODONE HCL 10 MG/1
10 TABLET, FILM COATED, EXTENDED RELEASE ORAL
Status: COMPLETED | OUTPATIENT
Start: 2017-09-05 | End: 2017-09-05

## 2017-09-05 RX ORDER — LIDOCAINE HYDROCHLORIDE ANHYDROUS AND EPINEPHRINE 10; 10 MG/ML; UG/ML
30 INJECTION, SOLUTION INFILTRATION ONCE
Status: DISCONTINUED | OUTPATIENT
Start: 2017-09-05 | End: 2017-09-05

## 2017-09-05 RX ORDER — LIDOCAINE HYDROCHLORIDE ANHYDROUS AND EPINEPHRINE 10; 10 MG/ML; UG/ML
30 INJECTION, SOLUTION INFILTRATION ONCE
Status: COMPLETED | OUTPATIENT
Start: 2017-09-05 | End: 2017-09-05

## 2017-09-05 RX ADMIN — OXYCODONE HYDROCHLORIDE 10 MG: 10 TABLET, FILM COATED, EXTENDED RELEASE ORAL at 14:57

## 2017-09-05 RX ADMIN — FENTANYL CITRATE 50 MCG: 50 INJECTION, SOLUTION INTRAMUSCULAR; INTRAVENOUS at 16:20

## 2017-09-05 RX ADMIN — FENTANYL CITRATE 150 MCG: 50 INJECTION, SOLUTION INTRAMUSCULAR; INTRAVENOUS at 15:55

## 2017-09-05 RX ADMIN — ACETAMINOPHEN 975 MG: 325 TABLET, FILM COATED ORAL at 14:57

## 2017-09-05 RX ADMIN — OXYCODONE HYDROCHLORIDE AND ACETAMINOPHEN 1 TABLET: 5; 325 TABLET ORAL at 18:40

## 2017-09-05 RX ADMIN — LIDOCAINE HYDROCHLORIDE 30 MG: 10 INJECTION, SOLUTION INFILTRATION; PERINEURAL at 15:55

## 2017-09-05 RX ADMIN — DEXAMETHASONE SODIUM PHOSPHATE 4 MG: 4 INJECTION, SOLUTION INTRA-ARTICULAR; INTRALESIONAL; INTRAMUSCULAR; INTRAVENOUS; SOFT TISSUE at 15:55

## 2017-09-05 RX ADMIN — SODIUM CHLORIDE, POTASSIUM CHLORIDE, SODIUM LACTATE AND CALCIUM CHLORIDE: 600; 310; 30; 20 INJECTION, SOLUTION INTRAVENOUS at 16:31

## 2017-09-05 RX ADMIN — MIDAZOLAM HYDROCHLORIDE 2 MG: 1 INJECTION, SOLUTION INTRAMUSCULAR; INTRAVENOUS at 15:42

## 2017-09-05 RX ADMIN — SODIUM CHLORIDE, POTASSIUM CHLORIDE, SODIUM LACTATE AND CALCIUM CHLORIDE: 600; 310; 30; 20 INJECTION, SOLUTION INTRAVENOUS at 15:09

## 2017-09-05 RX ADMIN — FENTANYL CITRATE 50 MCG: 50 INJECTION INTRAMUSCULAR; INTRAVENOUS at 17:20

## 2017-09-05 RX ADMIN — KETOROLAC TROMETHAMINE: 15 INJECTION, SOLUTION INTRAMUSCULAR; INTRAVENOUS at 16:29

## 2017-09-05 RX ADMIN — FENTANYL CITRATE 50 MCG: 50 INJECTION, SOLUTION INTRAMUSCULAR; INTRAVENOUS at 16:59

## 2017-09-05 RX ADMIN — GLYCOPYRROLATE 0.2 MG: 0.2 INJECTION, SOLUTION INTRAMUSCULAR; INTRAVENOUS at 15:55

## 2017-09-05 RX ADMIN — PROPOFOL 200 MG: 10 INJECTION, EMULSION INTRAVENOUS at 15:55

## 2017-09-05 RX ADMIN — ROCURONIUM BROMIDE 5 MG: 10 INJECTION INTRAVENOUS at 15:55

## 2017-09-05 RX ADMIN — CEFAZOLIN SODIUM 2 G: 2 INJECTION, SOLUTION INTRAVENOUS at 15:43

## 2017-09-05 RX ADMIN — PHENYLEPHRINE HYDROCHLORIDE 200 MCG: 10 INJECTION, SOLUTION INTRAMUSCULAR; INTRAVENOUS; SUBCUTANEOUS at 16:11

## 2017-09-05 RX ADMIN — LIDOCAINE HYDROCHLORIDE AND EPINEPHRINE 30 ML: 10; 10 INJECTION, SOLUTION INFILTRATION; PERINEURAL at 16:28

## 2017-09-05 RX ADMIN — Medication 100 MG: at 15:55

## 2017-09-05 RX ADMIN — TRANEXAMIC ACID 1 G: 100 INJECTION, SOLUTION INTRAVENOUS at 15:47

## 2017-09-05 RX ADMIN — FENTANYL CITRATE 50 MCG: 50 INJECTION INTRAMUSCULAR; INTRAVENOUS at 17:48

## 2017-09-05 ASSESSMENT — ENCOUNTER SYMPTOMS: DYSRHYTHMIAS: 0

## 2017-09-05 NOTE — TELEPHONE ENCOUNTER
MetFORMIN (GLUCOPHAGE-XR) 500 MG 24 hr tablet         Last Written Prescription Date: 07/22/2016  Last Fill Quantity: 90 tablet, # refills: 3  Last Office Visit with [facility], [facility] or [facility] prescribing provider:  08/30/2017   Next 5 appointments (look out 90 days)     Sep 12, 2017 10:20 AM CDT   Return Visit with Nolan Aguilera PA-C   [facility]    [address]  [address]  [place] [address]   [phone]            Sep 27, 2017 11:00 AM CDT   Office Visit with Chata Wallace MD   [facility] [place]    [address] [address] [phone]                   BP Readings from Last 3 Encounters:   08/30/17 120/78   08/21/17 128/78   06/07/17 98/60     Lab Results   Component Value Date    MICROL 13 07/22/2016     Lab Results   Component Value Date    UMALCR 7.26 07/22/2016     Creatinine   Date Value Ref Range Status   08/30/2017 0.70 0.52 - 1.04 mg/dL Final   ]  GFR Estimate   Date Value Ref Range Status   08/30/2017 83 >60 mL/min/1.7m2 Final     Comment:     Non  GFR Calc   06/07/2017 90 >60 mL/min/1.7m2 Final     Comment:     Non  GFR Calc   12/09/2016 89 >60 mL/min/1.7m2 Final     Comment:     Non  GFR Calc     GFR Estimate If Black   Date Value Ref Range Status   08/30/2017 >90 >60 mL/min/1.7m2 Final     Comment:      GFR Calc   06/07/2017 >90   GFR Calc   >60 mL/min/1.7m2 Final   12/09/2016 >90   GFR Calc   >60 mL/min/1.7m2 Final     Lab Results   Component Value Date    CHOL 159 12/09/2016     Lab Results   Component Value Date    HDL 48 12/09/2016     Lab Results   Component Value Date    LDL 83 12/09/2016     Lab Results   Component Value Date    TRIG 139 12/09/2016     Lab Results   Component Value Date    CHOLHDLRATIO 4.0 05/24/2010     Lab Results   Component Value Date     AST 15 12/09/2016     Lab Results   Component Value Date    ALT 27 12/09/2016     Lab Results   Component Value Date    A1C 6.2 08/30/2017    A1C 6.4 06/07/2017    A1C 6.0 12/09/2016    A1C 6.6 06/09/2016    A1C 6.6 06/08/2016     Potassium   Date Value Ref Range Status   08/30/2017 4.1 3.4 - 5.3 mmol/L Final         Lisbet Erwin MA

## 2017-09-05 NOTE — BRIEF OP NOTE
[facility]  Orthopedics Brief Operative Note    Pre-operative diagnosis: Painful Scar Tissue    Post-operative diagnosis painful total knee arthroplasty, right   Procedure: Procedure(s):  COMBINED IRRIGATION AND DEBRIDEMENT KNEE  Tibial polyethylene insert exchange   Surgeon: Jose Alberto Gomez MD   Assistants(s): SLIME Howell   Anesthesia: General    Estimated blood loss: * No blood loss amount entered *                Drains: None   Specimens: Fluid and tissue for culture   Implants: none       Complications: None

## 2017-09-05 NOTE — ANESTHESIA POSTPROCEDURE EVALUATION
Patient: Claribel Del Rio    Procedure(s):  Right knee exploration, scar tissue resection, polyethylene exchange - Wound Class: II-Clean Contaminated    Diagnosis:Painful Scar Tissue   Diagnosis Additional Information: Pre-operative diagnosis: Painful Scar Tissue   Post-operative diagnosis painful total knee arthroplasty, right  Procedure: Procedure(s):  COMBINED IRRIGATION AND DEBRIDEMENT KNEE  Tibial polyethylene insert exchange  Surgeon: Jose Alberto Gomez MD        Anesthesia Type:  General, ETT, RSI    Note:  Anesthesia Post Evaluation    Patient location during evaluation: PACU  Patient participation: Able to fully participate in evaluation  Level of consciousness: awake  Pain management: adequate  Airway patency: patent  Cardiovascular status: acceptable  Respiratory status: acceptable  Hydration status: euvolemic  PONV: controlled     Anesthetic complications: None          Last vitals:  Vitals:    09/05/17 1715 09/05/17 1720 09/05/17 1725   BP: 125/73 127/71 127/72   Resp: 11 10 8   Temp:      SpO2: (!) 83% 94% 95%         Electronically Signed By: Carlos Ag MD  September 5, 2017  5:40 PM

## 2017-09-05 NOTE — ANESTHESIA PREPROCEDURE EVALUATION
Anesthesia Evaluation     .             ROS/MED HX    ENT/Pulmonary:     (+)sleep apnea, asthma , . .    Neurologic:      (-) TIA and Dementia   Cardiovascular:     (+) Dyslipidemia, hypertension----. : . . . :. .      (-) CAD, CHF, arrhythmias and pulmonary hypertension   METS/Exercise Tolerance:     Hematologic:        (-) history of blood clots   Musculoskeletal:   (+) arthritis, , , -       GI/Hepatic:     (+) GERD (Symptomatic on medication) Symptomatic,       Renal/Genitourinary:     (+) chronic renal disease,       Endo:     (+) type II DM Obesity, .   (-) Type I DM, thyroid disease and chronic steroid usage   Psychiatric:     (+) psychiatric history depression      Infectious Disease:  - neg infectious disease ROS       Malignancy:      - no malignancy   Other:    - neg other ROS                 Physical Exam      Airway   Mallampati: II  TM distance: >3 FB  Neck ROM: full    Dental     Cardiovascular   Rhythm and rate: regular and normal  (-) no murmur    Pulmonary    breath sounds clear to auscultation                    Anesthesia Plan      History & Physical Review  History and physical reviewed and following examination; no interval change.    ASA Status:  3 .    NPO Status:  > 8 hours    Plan for General, ETT and RSI with Propofol induction. Maintenance will be Balanced.    PONV prophylaxis:  Ondansetron (or other 5HT-3) and Dexamethasone or Solumedrol       Postoperative Care  Postoperative pain management:  IV analgesics and Oral pain medications.      Consents  Anesthetic plan, risks, benefits and alternatives discussed with:  Patient..                          .

## 2017-09-05 NOTE — IP AVS SNAPSHOT
[facility] PreOP/PostOP    [address]    [facility] [address]    Phone:  [phone]    Fax:  [phone]                                       After Visit Summary   9/5/2017    Claribel Del Rio    MRN: [id number]           After Visit Summary Signature Page     I have received my discharge instructions, and my questions have been answered. I have discussed any challenges I see with this plan with the nurse or doctor.    ..........................................................................................................................................  Patient/Patient Representative Signature      ..........................................................................................................................................  Patient Representative Print Name and Relationship to Patient    ..................................................               ................................................  Date                                            Time    ..........................................................................................................................................  Reviewed by Signature/Title    ...................................................              ..............................................  Date                                                            Time

## 2017-09-05 NOTE — ANESTHESIA CARE TRANSFER NOTE
Patient: Claribel Del Rio    Procedure(s):  Right knee exploration, scar tissue resection, polyethylene exchange - Wound Class: II-Clean Contaminated    Diagnosis: Painful Scar Tissue   Diagnosis Additional Information: No value filed.    Anesthesia Type:   General, ETT, RSI     Note:  Airway :Face Mask  Patient transferred to:PACU  Comments: Spont Resps. Follows commands. Extubated. Maintains Resps. Tx to pacu. Report to RN      Vitals: (Last set prior to Anesthesia Care Transfer)    CRNA VITALS  9/5/2017 1632 - 9/5/2017 1709      9/5/2017             Pulse: 106    SpO2: 92 %    Resp Rate (observed): 11                Electronically Signed By: CARLYN Guerrero CRNA  September 5, 2017  5:09 PM

## 2017-09-05 NOTE — IP AVS SNAPSHOT
MRN:[id number]                      After Visit Summary   9/5/2017    Claribel Del Rio    MRN: [id number]           Thank you!     Thank you for choosing [facility] for your care. Our goal is always to provide you with excellent care. Hearing back from our patients is one way we can continue to improve our services. Please take a few minutes to complete the written survey that you may receive in the mail after you visit. If you would like to speak to someone directly about your visit please contact Patient Relations at [phone]. Thank you!          Patient Information     Date Of Birth          1945        About your hospital stay     You were admitted on:  September 5, 2017 You last received care in the:  [facility] PreOP/PostOP    You were discharged on:  September 5, 2017       Who to Call     For medical emergencies, please call 911.  For non-urgent questions about your medical care, please call your primary care provider or clinic, [phone]  For questions related to your surgery, please call your surgery clinic        Attending Provider     Provider Jose Alberto Gomes MD Orthopedics       Primary Care Provider Office Phone # Fax #    Chata Wallace [phone]      After Care Instructions     CPM machine       Use the CPM at home as much as tolerated.  Remove the knee immobilizer when using CPM            Discharge Instructions       Review outpatient procedure discharge instructions with patient as directed by Provider            Remove dressing - at 72 hours        OK to shower as of post op day #3.  New light gauze and tape over the incision site after each shower            Return to clinic       Return to clinic as scheduled            Weight bearing - As tolerated                 Your next 10 appointments already scheduled     Sep 12, 2017 10:20 AM CDT   Return Visit with Nolan Aguilera PA-C   [facility] ORTHOPEDIC  SURGERY ([facility])    [address]  [address]  [facility] [address]   [phone]            Sep 27, 2017 11:00 AM CDT   Office Visit with Chata Wallace MD   [facility]    [address] [id number] [phone]           Bring a current list of meds and any records pertaining to this visit. For Physicals, please bring immunization records and any forms needing to be filled out. Please arrive 10 minutes early to complete paperwork.              Further instructions from your care team             GENERAL ANESTHESIA OR SEDATION ADULT DISCHARGE INSTRUCTIONS   SPECIAL PRECAUTIONS FOR 24 HOURS AFTER SURGERY    IT IS NOT UNUSUAL TO FEEL LIGHT-HEADED OR FAINT, UP TO 24 HOURS AFTER SURGERY OR WHILE TAKING PAIN MEDICATION.  IF YOU HAVE THESE SYMPTOMS; SIT FOR A FEW MINUTES BEFORE STANDING AND HAVE SOMEONE ASSIST YOU WHEN YOU GET UP TO WALK OR USE THE BATHROOM.    YOU SHOULD REST AND RELAX FOR THE NEXT 24 HOURS AND YOU MUST MAKE ARRANGEMENTS TO HAVE SOMEONE STAY WITH YOU FOR AT LEAST 24 HOURS AFTER YOUR DISCHARGE.  AVOID HAZARDOUS AND STRENUOUS ACTIVITIES.  DO NOT MAKE IMPORTANT DECISIONS FOR 24 HOURS.    DO NOT DRIVE ANY VEHICLE OR OPERATE MECHANICAL EQUIPMENT FOR 24 HOURS FOLLOWING THE END OF YOUR SURGERY.  EVEN THOUGH YOU MAY FEEL NORMAL, YOUR REACTIONS MAY BE AFFECTED BY THE MEDICATION YOU HAVE RECEIVED.    DO NOT DRINK ALCOHOLIC BEVERAGES FOR 24 HOURS FOLLOWING YOUR SURGERY.    DRINK CLEAR LIQUIDS (APPLE JUICE, GINGER ALE, 7-UP, BROTH, ETC.).  PROGRESS TO YOUR REGULAR DIET AS YOU FEEL ABLE.    YOU MAY HAVE A DRY MOUTH, A SORE THROAT, MUSCLES ACHES OR TROUBLE SLEEPING.  THESE SHOULD GO AWAY AFTER 24 HOURS.    CALL YOUR DOCTOR FOR ANY OF THE FOLLOWING:  SIGNS OF INFECTION (FEVER, GROWING TENDERNESS AT THE SURGERY SITE, A LARGE AMOUNT OF DRAINAGE OR BLEEDING, SEVERE PAIN, FOUL-SMELLING DRAINAGE, REDNESS OR  "SWELLING.    IT HAS BEEN OVER 8 TO 10 HOURS SINCE SURGERY AND YOU ARE STILL NOT ABLE TO URINATE (PASS WATER).             Maximum acetaminophen (Tylenol) dose from all sources should not exceed 4 grams (4000 mg) per day. You received 1000mg of Tylenol at 3pm.  You had one oxyCODONE-acetaminophen 5-325 MG tablet at 6:30 PM.            Pending Results     Date and Time Order Name Status Description    2017 1619 Wound Culture Aerobic Bacterial In process     2017 1619 Tissue Culture Aerobic Bacterial In process     2017 1619 Anaerobic bacterial culture In process     2017 1619 Anaerobic bacterial culture In process             Admission Information     Date & Time Provider Department Dept. Phone    2017 Jose Alberto Gomez MD [facility] PreOP/PostOP [phone]      Your Vitals Were     Blood Pressure Temperature Respirations Height Pulse Oximetry       126/78 97.4  F (36.3  C) (Temporal) 10 1.6 m (5' 3\") 95%       MyChart Information     [employer] lets you send messages to your doctor, view your test results, renew your prescriptions, schedule appointments and more. To sign up, go to [email or web address] . Click on \"Log in\" on the left side of the screen, which will take you to the Welcome page. Then click on \"Sign up Now\" on the right side of the page.     You will be asked to enter the access code listed below, as well as some personal information. Please follow the directions to create your username and password.     Your access code is: [email or web address]  Expires: 2017  5:32 PM     Your access code will  in 90 days. If you need help or a new code, please call your [facility] clinic or [phone].        Care EveryWhere ID     This is your Care EveryWhere ID. This could be used by other organizations to access your [facility] medical records  [id number]        Equal Access to Services     DEMETRI MORELOS AH: Jamey Whitney, sony cevallos, thalia bojorquez " bernadette bright german francis'aan ah. [facility] [phone].    ATENCIÓN: Si habla laine, tiene a schaeffer disposición servicios gratuitos de asistencia lingüística. Afshin al [phone].    We comply with applicable federal civil rights laws and [place] laws. We do not discriminate on the basis of race, color, national origin, age, disability sex, sexual orientation or gender identity.               Review of your medicines      UNREVIEWED medicines. Ask your doctor about these medicines        Dose / Directions    albuterol 108 (90 BASE) MCG/ACT Inhaler   Commonly known as:  PROAIR HFA/PROVENTIL HFA/VENTOLIN HFA   Used for:  Moderate persistent asthma without complication        Dose:  2 puff   Inhale 2 puffs into the lungs every 4 hours as needed for shortness of breath / dyspnea or wheezing   Quantity:  1 Inhaler   Refills:  5       escitalopram 20 MG tablet   Commonly known as:  LEXAPRO   Used for:  Major depression        Dose:  20 mg   Take 1 tablet (20 mg) by mouth daily   Quantity:  30 tablet   Refills:  1       fexofenadine-pseudoePHEDrine  MG per 12 hr tablet   Commonly known as:  ALLEGRA-D        Dose:  1 tablet   Take 1 tablet by mouth 2 times daily as needed   Refills:  0       fluticasone-salmeterol 500-50 MCG/DOSE diskus inhaler   Commonly known as:  ADVAIR DISKUS   Used for:  Moderate persistent asthma without complication        Dose:  1 puff   Inhale 1 puff into the lungs 2 times daily   Quantity:  3 Inhaler   Refills:  3       metFORMIN 500 MG 24 hr tablet   Commonly known as:  GLUCOPHAGE-XR   Used for:  Type 2 diabetes mellitus without complication (H)        Dose:  500 mg   Take 1 tablet (500 mg) by mouth daily (with dinner)   Quantity:  90 tablet   Refills:  3       montelukast 10 MG tablet   Commonly known as:  SINGULAIR   Used for:  Moderate persistent asthma without complication        TAKE 1 TABLET(10 MG) BY MOUTH AT BEDTIME   Quantity:  90 tablet   Refills:  2       OMEGA-3 FISH OIL PO         Dose:  1 g   Take 1 g by mouth daily   Refills:  0       omeprazole 20 MG CR capsule   Commonly known as:  priLOSEC        Refills:  0       simvastatin 20 MG tablet   Commonly known as:  ZOCOR   Used for:  Hyperlipidemia LDL goal <100        Dose:  20 mg   Take 1 tablet (20 mg) by mouth At Bedtime   Quantity:  90 tablet   Refills:  1       VITAMIN D3 PO        Dose:  1000 Units   Take 1,000 Units by mouth daily   Refills:  0       zolpidem 10 MG tablet   Commonly known as:  AMBIEN   Used for:  Insomnia        1 TABLET AT BEDTIME AS NEEDED   Quantity:  30 tablet   Refills:  0         START taking        Dose / Directions    oxyCODONE-acetaminophen 5-325 MG per tablet   Commonly known as:  PERCOCET   Used for:  Post-op pain        Dose:  1-2 tablet   Take 1-2 tablets by mouth every 6 hours as needed for pain (moderate to severe)   Quantity:  40 tablet   Refills:  0         CONTINUE these medicines which have NOT CHANGED        Dose / Directions    blood glucose monitoring lancets   Used for:  Type 2 diabetes mellitus without complication, without long-term current use of insulin (H)        Use to test blood sugar 2 times daily or as directed.  Ok to substitute alternative if insurance prefers.   Quantity:  100 each   Refills:  3       blood glucose monitoring test strip   Commonly known as:  EMILY CONTOUR NEXT   Used for:  Type 2 diabetes mellitus without complication, without long-term current use of insulin (H)        Use to test blood sugar 2 times daily or as directed.   Quantity:  100 each   Refills:  3            Where to get your medicines      Some of these will need a paper prescription and others can be bought over the counter. Ask your nurse if you have questions.     Bring a paper prescription for each of these medications     oxyCODONE-acetaminophen 5-325 MG per tablet                Protect others around you: Learn how to safely use, store and throw away your medicines at www.disposemymeds.org.              Medication List: This is a list of all your medications and when to take them. Check marks below indicate your daily home schedule. Keep this list as a reference.      Medications           Morning Afternoon Evening Bedtime As Needed    albuterol 108 (90 BASE) MCG/ACT Inhaler   Commonly known as:  PROAIR HFA/PROVENTIL HFA/VENTOLIN HFA   Inhale 2 puffs into the lungs every 4 hours as needed for shortness of breath / dyspnea or wheezing                                blood glucose monitoring lancets   Use to test blood sugar 2 times daily or as directed.  Ok to substitute alternative if insurance prefers.                                blood glucose monitoring test strip   Commonly known as:  Tango Card CONTOUR NEXT   Use to test blood sugar 2 times daily or as directed.                                escitalopram 20 MG tablet   Commonly known as:  LEXAPRO   Take 1 tablet (20 mg) by mouth daily                                fexofenadine-pseudoePHEDrine  MG per 12 hr tablet   Commonly known as:  ALLEGRA-D   Take 1 tablet by mouth 2 times daily as needed                                fluticasone-salmeterol 500-50 MCG/DOSE diskus inhaler   Commonly known as:  ADVAIR DISKUS   Inhale 1 puff into the lungs 2 times daily                                metFORMIN 500 MG 24 hr tablet   Commonly known as:  GLUCOPHAGE-XR   Take 1 tablet (500 mg) by mouth daily (with dinner)                                montelukast 10 MG tablet   Commonly known as:  SINGULAIR   TAKE 1 TABLET(10 MG) BY MOUTH AT BEDTIME                                OMEGA-3 FISH OIL PO   Take 1 g by mouth daily                                omeprazole 20 MG CR capsule   Commonly known as:  priLOSEC                                oxyCODONE-acetaminophen 5-325 MG per tablet   Commonly known as:  PERCOCET   Take 1-2 tablets by mouth every 6 hours as needed for pain (moderate to severe)                                simvastatin 20 MG tablet   Commonly  known as:  ZOCOR   Take 1 tablet (20 mg) by mouth At Bedtime                                VITAMIN D3 PO   Take 1,000 Units by mouth daily                                zolpidem 10 MG tablet   Commonly known as:  AMBIEN   1 TABLET AT BEDTIME AS NEEDED

## 2017-09-06 ENCOUNTER — TELEPHONE (OUTPATIENT)
Dept: ORTHOPEDICS | Facility: CLINIC | Age: 72
End: 2017-09-06

## 2017-09-06 ENCOUNTER — TRANSFERRED RECORDS (OUTPATIENT)
Dept: HEALTH INFORMATION MANAGEMENT | Facility: CLINIC | Age: 72
End: 2017-09-06

## 2017-09-06 RX ORDER — METFORMIN HCL 500 MG
TABLET, EXTENDED RELEASE 24 HR ORAL
Qty: 90 TABLET | Refills: 0 | Status: SHIPPED | OUTPATIENT
Start: 2017-09-06 | End: 2017-12-02

## 2017-09-06 NOTE — OP NOTE
DATE OF PROCEDURE:  09/05/2017       PREOPERATIVE DIAGNOSIS:  Painful right total knee arthroplasty.      POSTOPERATIVE DIAGNOSIS:  Painful right total knee arthroplasty.      PROCEDURE:   1.  Right knee exploration and limited scar tissue excision.   2.  Tibial polyethylene insert exchange (after removal of the original polyethylene component and following the posterior cruciate ligament resection, a deep dish polyethylene #3 of 9 mm thickness was placed ).      SURGEON:  Jose Alberto Gomez MD      FIRST ASSISTANT:  Sebas Aguilera PA-C      ANESTHESIA:  General anesthesia.      INDICATIONS FOR THE PROCEDURE:  Ms. Claribel Del Rio is currently a 72-year-old female who has had ongoing pain following a right total knee arthroplasty performed on 06/07/2016.  Post-surgical x-rays did not reveal any specific pathology.  She went through physical therapy; however, she continued to feel ongoing pain and was bothered by limited range of motion.  Preoperatively, bone scan demonstrated increased uptake in both femur and tibia components.  The possibility of loosening versus infection were discussed.  Clinically, however, she did not have any redness or warmth indicating infection process.  For that reason, mainly because of ongoing pain, an exploration was felt to be reasonable with a consideration of possible component extraction versus limited scar tissue excision and the polyethylene insert exchange.  Possibility of persisting pain even after the index procedure was thoroughly informed.      DESCRIPTION OF PROCEDURE:  With satisfactory general anesthesia in supine position, the right knee was prepped and draped in a routine sterile fashion.  Tourniquet was inflated to 300 mmHg at this point.      The previous incision was reentered.  Subcutaneous tissue was elevated medially.  A medial parapatellar arthrotomy was made.  The previous parapatellar arthrotomy was noted to be healed quite well.  As expected, there was a significant amount  of scar tissue around the patellar tendon and some of the scar tissue was excised at this point at the time of the exposure.  Some of the scar tissue around the patella was also removed at this point.      Some of the scar tissue from the medial and lateral gutters as well as suprapatellar pouch was debrided with a combination of scalpel and rongeur.      The fluid itself was noted to be clear.  It was not purulent.  It was also not thick.  The culture was taken at this point from the fluid for the aerobic and anaerobic microorganisms.  The scar tissue was excised and some of the scar tissue was sent to the lab for tissue culture along with a fluid culture of the knee.      We examined the components at this point and did not really notice any gross movement.  No evidence of loosening was noted for the femur as well as tibia.      The polyethylene component was exposed at this time with completion of partial scar tissue excision.  The PCL was then resected at this point under direct visualization with care taken to avoid the popliteal structures.  With resection of the posterior crucial ligament, we decided to place anterior lipped polyethylene component for providing anterior stability.      The polyethylene component was then inserted after copious irrigation.      The wound was closed in layers in a routine fashion.  The blood loss was minimal, less than 2 mL.  The needle count and sponge count were correct at the end of the case.  No intraoperative complications were noted.         KENNETH GRAY MD             D: 2017 17:05   T: 2017 22:11   MT: [id number]      Name:     ARIES SPARROW   MRN:      [id number]        Account:        [id number]   :      1945           Procedure Date: 2017      Document: [id number]

## 2017-09-06 NOTE — TELEPHONE ENCOUNTER
Refill approved through [facility] protocol.  Araceli Soares RN  [facility]  [phone]

## 2017-09-06 NOTE — TELEPHONE ENCOUNTER
Spoke with patient.  Doing well, no questions at this time.  Offered number for nurse triage and confirmed follow up appointment.    Nolan Aguilera PA-C  [facility] - Surgery

## 2017-09-07 LAB
BACTERIA SPEC CULT: NO GROWTH
Lab: NORMAL
SPECIMEN SOURCE: NORMAL

## 2017-09-11 DIAGNOSIS — T84.59XA INFECTED PROSTHETIC KNEE JOINT, INITIAL ENCOUNTER (H): Primary | ICD-10-CM

## 2017-09-11 DIAGNOSIS — Z96.659 INFECTED PROSTHETIC KNEE JOINT, INITIAL ENCOUNTER (H): Primary | ICD-10-CM

## 2017-09-11 RX ORDER — CLINDAMYCIN HCL 300 MG
300 CAPSULE ORAL 3 TIMES DAILY
Qty: 180 CAPSULE | Refills: 1 | Status: SHIPPED | OUTPATIENT
Start: 2017-09-11 | End: 2018-01-30

## 2017-09-12 ENCOUNTER — OFFICE VISIT (OUTPATIENT)
Dept: ORTHOPEDICS | Facility: CLINIC | Age: 72
End: 2017-09-12
Payer: COMMERCIAL

## 2017-09-12 DIAGNOSIS — Z47.89 ORTHOPEDIC AFTERCARE: Primary | ICD-10-CM

## 2017-09-12 LAB
BACTERIA SPEC CULT: ABNORMAL
BACTERIA SPEC CULT: NORMAL
Lab: ABNORMAL
Lab: ABNORMAL
Lab: NORMAL
SPECIMEN SOURCE: ABNORMAL
SPECIMEN SOURCE: ABNORMAL
SPECIMEN SOURCE: NORMAL

## 2017-09-12 PROCEDURE — 99024 POSTOP FOLLOW-UP VISIT: CPT | Performed by: PHYSICIAN ASSISTANT

## 2017-09-12 NOTE — MR AVS SNAPSHOT
After Visit Summary   9/12/2017    Claribel Del Rio    MRN: [id number]           Patient Information     Date Of Birth          1945        Visit Information        Provider Department      9/12/2017 10:20 AM Nolan Aguilera PA-C [facility] ORTHOPEDIC SURGERY        Today's Diagnoses     Orthopedic aftercare    -  1      Care Instructions    Incision care instructions:  Keep dry 24 hours.  Showering is ok after that time, however no soaking or scrubbing of incision for 2 weeks.  Tape-strips will most likely fall off on their own, however they may be removed after 2 weeks with rubbing alcohol if they have not.    If draining or bleeding stops the tape-strips are enough coverage unless you were instructed otherwise or you would like to cover for comfort.  If drainage or bleeding continues please cover with clean dressings.     Continue the continuous passive motion machine and antibiotics.  Notify us if you begin to have issue with the antibiotic or knee.    It is recommended that you avoid invasive procedures such as dental work or colonoscopy, or minor surgery for 4 months after surgery unless it is an emergency.    Activities as tolerated.    Please follow up in 4 weeks.            Follow-ups after your visit        Follow-up notes from your care team     Return in about 4 weeks (around 10/10/2017).      Your next 10 appointments already scheduled     Sep 27, 2017 11:00 AM CDT   Office Visit with Chata Wallace MD   [facility] ([facility])    [address] [place] [id number] [phone]           Bring a current list of meds and any records pertaining to this visit. For Physicals, please bring immunization records and any forms needing to be filled out. Please arrive 10 minutes early to complete paperwork.              Who to contact     If you have questions or need follow up information about today's clinic visit or your  "schedule please contact [facility] ORTHOPEDIC SURGERY directly at [phone].  Normal or non-critical lab and imaging results will be communicated to you by MyChart, letter or phone within 4 business days after the clinic has received the results. If you do not hear from us within 7 days, please contact the clinic through MyChart or phone. If you have a critical or abnormal lab result, we will notify you by phone as soon as possible.  Submit refill requests through [employer] or call your pharmacy and they will forward the refill request to us. Please allow 3 business days for your refill to be completed.          Additional Information About Your Visit        [employer] Information     [employer] lets you send messages to your doctor, view your test results, renew your prescriptions, schedule appointments and more. To sign up, go to [email or web address] . Click on \"Log in\" on the left side of the screen, which will take you to the Welcome page. Then click on \"Sign up Now\" on the right side of the page.     You will be asked to enter the access code listed below, as well as some personal information. Please follow the directions to create your username and password.     Your access code is: [email or web address]  Expires: 2017  5:32 PM     Your access code will  in 90 days. If you need help or a new code, please call your [facility] clinic or [phone].        Care EveryWhere ID     This is your Care EveryWhere ID. This could be used by other organizations to access your [facility] medical records  [id number]         Blood Pressure from Last 3 Encounters:   17 123/74   17 120/78   17 128/78    Weight from Last 3 Encounters:   17 180 lb (81.6 kg)   17 188 lb 12.8 oz (85.6 kg)   16 184 lb (83.5 kg)              Today, you had the following     No orders found for display       Primary Care Provider Office Phone # Fax #    Chata Wallace [phone] [place] [address]        Equal Access to Services     DEMETRI MORELOS : Hadii anderson ku hadchristinsully Sokristinali, waaxda luqadaha, qaybta kamariolaerlinda montemayor, thalia robersonanytrip márquez. So [facility] [phone].    ATENCIÓN: Si habla español, tiene a schaeffer disposición servicios gratuitos de asistencia lingüística. Llame al [phone].    We comply with applicable federal civil rights laws and [place] laws. We do not discriminate on the basis of race, color, national origin, age, disability sex, sexual orientation or gender identity.            Thank you!     Thank you for choosing [facility] ORTHOPEDIC SURGERY  for your care. Our goal is always to provide you with excellent care. Hearing back from our patients is one way we can continue to improve our services. Please take a few minutes to complete the written survey that you may receive in the mail after your visit with us. Thank you!             Your Updated Medication List - Protect others around you: Learn how to safely use, store and throw away your medicines at www.disposemymeds.org.          This list is accurate as of: 9/12/17 11:08 AM.  Always use your most recent med list.                   Brand Name Dispense Instructions for use Diagnosis    albuterol 108 (90 BASE) MCG/ACT Inhaler    PROAIR HFA/PROVENTIL HFA/VENTOLIN HFA    1 Inhaler    Inhale 2 puffs into the lungs every 4 hours as needed for shortness of breath / dyspnea or wheezing    Moderate persistent asthma without complication       blood glucose monitoring lancets     100 each    Use to test blood sugar 2 times daily or as directed.  Ok to substitute alternative if insurance prefers.    Type 2 diabetes mellitus without complication, without long-term current use of insulin (H)       blood glucose monitoring test strip    EMILY CONTOUR NEXT    100 each    Use to test blood sugar 2 times daily or as directed.    Type 2 diabetes mellitus without complication, without  long-term current use of insulin (H)       clindamycin 300 MG capsule    CLEOCIN    180 capsule    Take 1 capsule (300 mg) by mouth 3 times daily    Infected prosthetic knee joint, initial encounter (H)       escitalopram 20 MG tablet    LEXAPRO    30 tablet    Take 1 tablet (20 mg) by mouth daily    Major depression       fexofenadine-pseudoePHEDrine  MG per 12 hr tablet    ALLEGRA-D     Take 1 tablet by mouth 2 times daily as needed        fluticasone-salmeterol 500-50 MCG/DOSE diskus inhaler    ADVAIR DISKUS    3 Inhaler    Inhale 1 puff into the lungs 2 times daily    Moderate persistent asthma without complication       * metFORMIN 500 MG 24 hr tablet    GLUCOPHAGE-XR    90 tablet    Take 1 tablet (500 mg) by mouth daily (with dinner)    Type 2 diabetes mellitus without complication (H)       * metFORMIN 500 MG 24 hr tablet    GLUCOPHAGE-XR    90 tablet    TAKE 1 TABLET(500 MG) BY MOUTH DAILY WITH DINNER    Type 2 diabetes mellitus without complication (H)       montelukast 10 MG tablet    SINGULAIR    90 tablet    TAKE 1 TABLET(10 MG) BY MOUTH AT BEDTIME    Moderate persistent asthma without complication       OMEGA-3 FISH OIL PO      Take 1 g by mouth daily        omeprazole 20 MG CR capsule    priLOSEC          oxyCODONE-acetaminophen 5-325 MG per tablet    PERCOCET    40 tablet    Take 1-2 tablets by mouth every 6 hours as needed for pain (moderate to severe)    Post-op pain       simvastatin 20 MG tablet    ZOCOR    90 tablet    Take 1 tablet (20 mg) by mouth At Bedtime    Hyperlipidemia LDL goal <100       VITAMIN D3 PO      Take 1,000 Units by mouth daily        zolpidem 10 MG tablet    AMBIEN    30 tablet    1 TABLET AT BEDTIME AS NEEDED    Insomnia       * Notice:  This list has 2 medication(s) that are the same as other medications prescribed for you. Read the directions carefully, and ask your doctor or other care provider to review them with you.

## 2017-09-12 NOTE — PATIENT INSTRUCTIONS
Incision care instructions:  Keep dry 24 hours.  Showering is ok after that time, however no soaking or scrubbing of incision for 2 weeks.  Tape-strips will most likely fall off on their own, however they may be removed after 2 weeks with rubbing alcohol if they have not.    If draining or bleeding stops the tape-strips are enough coverage unless you were instructed otherwise or you would like to cover for comfort.  If drainage or bleeding continues please cover with clean dressings.     Continue the continuous passive motion machine and antibiotics.  Notify us if you begin to have issue with the antibiotic or knee.    It is recommended that you avoid invasive procedures such as dental work or colonoscopy, or minor surgery for 4 months after surgery unless it is an emergency.    Activities as tolerated.    Please follow up in 4 weeks.

## 2017-09-12 NOTE — PROGRESS NOTES
HISTORY OF PRESENT ILLNESS:    Claribel Del Rio is a 72 year old female who is seen in follow up for Right TKA scar tissue removal and poly exchange, DOS 9/5/17, Dr. Gomez..  Present symptoms: Pt reports some stiffness and improving pain.  Using CPM and doing PT.    Denies Chest pain, Calve pain, Fever, Chills.    PHYSICAL EXAM:  There were no vitals taken for this visit.  There is no height or weight on file to calculate BMI.   GENERAL APPEARANCE: healthy, alert and no distress   PSYCH:  mentation appears normal and affect normal/bright    MSK:  Right: Knee .  Ambulates: Limps with walker..  Incision clean and dry, staples present, healing.  Appropriate incisional erythema.   Ecchymosis resolving..  No calve pain on palpation.  Edema Moderate at knee, min BK  CMS: stephanie incisional numbness, otherwise grossly intact.  AROM fleixon 5-80.      IMAGING INTERPRETATION:  None today.     ASSESSMENT:  Claribel Del Rio is a 72 year old female S/P Right TKA scar tissue removal and poly exchange, DOS 9/5/17, Dr. Gomez...  Doing well.    PLAN:  - Surgery discussed, images reviewed if applicable, and all questions were answered at this time.  - staples removed with sterile technique, steri-strips applied in usual fashion, care instructions given and verbally acknowledged.  - Medications: as current.  - continue CPM and PT.  - AAT    Return to clinic 4 weeks.    Nolan Aguilera PA-C    Dept. Orthopedic Surgery  [facility]   9/12/2017

## 2017-10-02 ENCOUNTER — TELEPHONE (OUTPATIENT)
Dept: ORTHOPEDICS | Facility: CLINIC | Age: 72
End: 2017-10-02

## 2017-10-02 NOTE — TELEPHONE ENCOUNTER
Patient calls asking for a return call from Dr. Gomez.   She would prefer to speak with Dr. Gomez instead of Se[facility].   She would like to discuss the CPM machine she is currently using. It broke down yesterday and it is almost time for her to be done with it.   Would like to discuss if he recommends continuing it or not based on her knee function.     Patient had Right knee exploration and limited scar tissue excision by Dr. Gomez on 9/5/17.     Patient can be reached at [phone]. Ok to leave message.     CHENTE Noyola, RN

## 2017-10-03 ENCOUNTER — RADIANT APPOINTMENT (OUTPATIENT)
Dept: GENERAL RADIOLOGY | Facility: CLINIC | Age: 72
End: 2017-10-03
Attending: FAMILY MEDICINE
Payer: COMMERCIAL

## 2017-10-03 ENCOUNTER — OFFICE VISIT (OUTPATIENT)
Dept: FAMILY MEDICINE | Facility: CLINIC | Age: 72
End: 2017-10-03
Payer: COMMERCIAL

## 2017-10-03 VITALS
HEART RATE: 80 BPM | OXYGEN SATURATION: 97 % | SYSTOLIC BLOOD PRESSURE: 122 MMHG | TEMPERATURE: 97.6 F | DIASTOLIC BLOOD PRESSURE: 70 MMHG | HEIGHT: 64 IN

## 2017-10-03 DIAGNOSIS — M25.552 PAIN OF BOTH HIP JOINTS: ICD-10-CM

## 2017-10-03 DIAGNOSIS — M25.551 PAIN OF BOTH HIP JOINTS: ICD-10-CM

## 2017-10-03 DIAGNOSIS — Z23 NEED FOR PROPHYLACTIC VACCINATION AND INOCULATION AGAINST INFLUENZA: ICD-10-CM

## 2017-10-03 DIAGNOSIS — J45.20 MILD INTERMITTENT ASTHMA, UNSPECIFIED WHETHER COMPLICATED: Primary | ICD-10-CM

## 2017-10-03 PROCEDURE — 99214 OFFICE O/P EST MOD 30 MIN: CPT | Mod: 25 | Performed by: FAMILY MEDICINE

## 2017-10-03 PROCEDURE — 90662 IIV NO PRSV INCREASED AG IM: CPT | Performed by: FAMILY MEDICINE

## 2017-10-03 PROCEDURE — G0008 ADMIN INFLUENZA VIRUS VAC: HCPCS | Performed by: FAMILY MEDICINE

## 2017-10-03 PROCEDURE — 73523 X-RAY EXAM HIPS BI 5/> VIEWS: CPT

## 2017-10-03 NOTE — PROGRESS NOTES
SUBJECTIVE:   Claribel Del Rio is a 72 year old female who presents to clinic today for the following health issues:      Asthma Follow-Up    Was ACT completed today?    Yes    ACT Total Scores 10/3/2017   ACT TOTAL SCORE -   ASTHMA ER VISITS -   ASTHMA HOSPITALIZATIONS -   ACT TOTAL SCORE (Goal Greater than or Equal to 20) 21   In the past 12 months, how many times did you visit the emergency room for your asthma without being admitted to the hospital? 0   In the past 12 months, how many times were you hospitalized overnight because of your asthma? 0       Recent asthma triggers that patient is dealing with: smoke, dust mites, pollens and exercise or sports      C/o hip pain on both sides. She is currently getting PT for her knee. Thinks that because of her knee pain she has been limping. But now with her hip pain it is very hard to walk much. No radiation of pain. Pain is 5/10. No associated numbness or tingling.       Amount of exercise or physical activity: None    Problems taking medications regularly: No    Medication side effects: none  Diet: regular (no restrictions)        Problem list and histories reviewed & adjusted, as indicated.    Additional history: as documented    Patient Active Problem List   Diagnosis     Obesity     Fibromyalgia     Advanced directives, counseling/discussion     Allergic rhinitis     Low back pain     MIRZA (obstructive sleep apnea)- on CPAP     Status post total right knee replacement     Type 2 diabetes mellitus without complication (H)     Hyperlipidemia LDL goal <100     Mild single current episode of major depressive disorder (H)     Osteopenia     Mild intermittent asthma, unspecified whether complicated     Past Surgical History:   Procedure Laterality Date     APPENDECTOMY  1992     ARTHROPLASTY KNEE Right 6/7/2016    Procedure: ARTHROPLASTY KNEE;  Surgeon: Jose Alberto Gomez MD;  Location: [facility] OR     CARPAL TUNNEL RELEASE RT/LT  05/02, 08/02    right 5/2002, left 8/2002      COLONOSCOPY  4/2009    Normal; repeat in 10 years     COLONOSCOPY N/A 11/4/2015    Procedure: COLONOSCOPY;  Surgeon: Cathie Melendez MD;  Location: [facility]     EXCHANGE POLY COMPONENT ARTHROPLASTY KNEE Right 9/5/2017    Procedure: EXCHANGE POLY COMPONENT ARTHROPLASTY KNEE;  1.  Right knee exploration and limited scar tissue excision.   2.  Tibial polyethylene insert exchange (after removal of the original polyethylene component and following the posterior cruciate ligament resection, a deep dish polyethylene #3 of 9 mm thickness was placed)     ;  Surgeon: Jose Alberto Gomez MD;  Location: [facility] OR     HYSTERECTOMY TOTAL ABDOMINAL, BILATERAL SALPINGO-OOPHORECTOMY, COMBINED  11/18/1992    Fibroids, endometriosis, Dr Rosas     JOINT REPLACEMENT  4/2010    bilateral thumb     MANOMETRY (ESOPHAGEAL)  10/2007    Normal     PUNC/ASPIR BREAST CYST  11/2004     TONSILLECTOMY  1950       Social History   Substance Use Topics     Smoking status: Former Smoker     Packs/day: 0.00     Years: 6.00     Types: Cigarettes     Quit date: 6/9/1974     Smokeless tobacco: Never Used      Comment: Previous smoker, 1-1.5 packs per week     Alcohol use 2.4 - 3.6 oz/week     4 - 6 Standard drinks or equivalent per week      Comment: 1 drnk monthly     Family History   Problem Relation Age of Onset     Hypertension Father      Blood Disease Father      DVT's     Eye Disorder Father      Colon Cancer Father      Colon Cancer Paternal Uncle      Asthma Sister      C.A.D. Paternal Grandfather      DIABETES Maternal Aunt      Colon Cancer Maternal Uncle      Hyperlipidemia Paternal Grandmother      Hypertension Paternal Grandmother      Hyperlipidemia Other      Aunt     Hypertension Maternal Grandfather      Hypertension Other      Aunt     Breast Cancer No family hx of      Anesthesia Reaction No family hx of      OSTEOPOROSIS No family hx of      Thyroid Disease No family hx of          Current Outpatient Prescriptions   Medication Sig  Dispense Refill     fluticasone-salmeterol (ADVAIR DISKUS) 500-50 MCG/DOSE diskus inhaler Inhale 1 puff into the lungs 2 times daily 3 Inhaler 0     clindamycin (CLEOCIN) 300 MG capsule Take 1 capsule (300 mg) by mouth 3 times daily 180 capsule 1     metFORMIN (GLUCOPHAGE-XR) 500 MG 24 hr tablet TAKE 1 TABLET(500 MG) BY MOUTH DAILY WITH DINNER 90 tablet 0     oxyCODONE-acetaminophen (PERCOCET) 5-325 MG per tablet Take 1-2 tablets by mouth every 6 hours as needed for pain (moderate to severe) 40 tablet 0     albuterol (PROAIR HFA/PROVENTIL HFA/VENTOLIN HFA) 108 (90 BASE) MCG/ACT Inhaler Inhale 2 puffs into the lungs every 4 hours as needed for shortness of breath / dyspnea or wheezing 1 Inhaler 5     blood glucose monitoring (EMILY CONTOUR NEXT) test strip Use to test blood sugar 2 times daily or as directed. 100 each 3     blood glucose monitoring (EMILY MICROLET) lancets Use to test blood sugar 2 times daily or as directed.  Ok to substitute alternative if insurance prefers. 100 each 3     montelukast (SINGULAIR) 10 MG tablet TAKE 1 TABLET(10 MG) BY MOUTH AT BEDTIME 90 tablet 2     simvastatin (ZOCOR) 20 MG tablet Take 1 tablet (20 mg) by mouth At Bedtime 90 tablet 1     omeprazole (PRILOSEC) 20 MG CR capsule   0     metFORMIN (GLUCOPHAGE-XR) 500 MG 24 hr tablet Take 1 tablet (500 mg) by mouth daily (with dinner) 90 tablet 3     fexofenadine-pseudoePHEDrine (ALLEGRA-D)  MG per tablet Take 1 tablet by mouth 2 times daily as needed        Omega-3 Fatty Acids (OMEGA-3 FISH OIL PO) Take 1 g by mouth daily        Cholecalciferol (VITAMIN D3 PO) Take 1,000 Units by mouth daily        zolpidem (AMBIEN) 10 MG tablet 1 TABLET AT BEDTIME AS NEEDED 30 tablet 0     escitalopram (LEXAPRO) 20 MG tablet Take 1 tablet (20 mg) by mouth daily 30 tablet 1     Allergies   Allergen Reactions     Ivp Dye [Contrast Dye] Shortness Of Breath     Claritin [Loratadine]      Hallucinations           Reviewed and updated as needed this  "visit by clinical staffTobacco  Allergies  Meds  Med Hx  Surg Hx  Fam Hx  Soc Hx      Reviewed and updated as needed this visit by Provider  Meds         ROS:  C: NEGATIVE for fever, chills, change in weight  E/M: NEGATIVE for ear, mouth and throat problems  R: NEGATIVE for significant cough or SOB  CV: NEGATIVE for chest pain, palpitations or peripheral edema    OBJECTIVE:                                                    /70  Pulse 80  Temp 97.6  F (36.4  C) (Tympanic)  Ht 5' 3.5\" (1.613 m)  SpO2 97%  There is no height or weight on file to calculate BMI.   GENERAL: healthy, alert, well nourished, well hydrated, no distress  HENT: ear canals- normal; TMs- normal; Nose- normal; Mouth- no ulcers, no lesions  NECK: no tenderness, no adenopathy, no asymmetry, no masses, no stiffness; thyroid- normal to palpation  RESP: lungs clear to auscultation - no rales, no rhonchi, no wheezes  CV: regular rates and rhythm, normal S1 S2, no S3 or S4 and no murmur, no click or rub -  ABDOMEN: soft, no tenderness, no  hepatosplenomegaly, no masses, normal bowel sounds  MS: ROM at both hips is normal.  No localized tenderness noted.   Gait is antalgic.        ASSESSMENT/PLAN:                                                      1. Mild intermittent asthma, unspecified whether complicated  Well controlled. Refill on advair ordered  - fluticasone-salmeterol (ADVAIR DISKUS) 500-50 MCG/DOSE diskus inhaler; Inhale 1 puff into the lungs 2 times daily  Dispense: 3 Inhaler; Refill: 0    2. Pain of both hip joints    - XR Pelvis w Hip Bilateral G/E 2 Views; ordered and reviewed  Hip x-ray shows normal findings on both sides.    X-ray shows degenerative changes in the lumbar spine.   I would recommended PT. Patient is seeing PT for knee pain management.   External orders for hip PT ordered.     3. Need for prophylactic vaccination and inoculation against influenza    - FLU VACCINE, INCREASED ANTIGEN, PRESV FREE, AGE 65+ " [05837]  - ADMIN INFLUENZA (For MEDICARE Patients ONLY) []         Chata Wallace MD  [facility]  Injectable Influenza Immunization Documentation    1.  Is the person to be vaccinated sick today?   No    2. Does the person to be vaccinated have an allergy to a component   of the vaccine?   No    3. Has the person to be vaccinated ever had a serious reaction   to influenza vaccine in the past?   No    4. Has the person to be vaccinated ever had Guillain-Barré syndrome?   No    Form completed by María Elena Hernandez CMA

## 2017-10-03 NOTE — MR AVS SNAPSHOT
After Visit Summary   10/3/2017    Claribel Del Rio    MRN: [id number]           Patient Information     Date Of Birth          1945        Visit Information        Provider Department      10/3/2017 1:40 PM Chata Wallace MD [facility] [place] [facility]        Today's Diagnoses     Mild intermittent asthma, unspecified whether complicated    -  1    Pain of both hip joints           Follow-ups after your visit        Follow-up notes from your care team     Return in about 2 months (around 12/5/2017) for Annual Physical Exam.      Your next 10 appointments already scheduled     Dec 08, 2017 11:00 AM CST   Office Visit with Chata Wallace MD   [facility] ([facility]    [address] [address] [phone]           Bring a current list of meds and any records pertaining to this visit. For Physicals, please bring immunization records and any forms needing to be filled out. Please arrive 10 minutes early to complete paperwork.              Future tests that were ordered for you today     Open Future Orders        Priority Expected Expires Ordered    XR Pelvis w Hip Bilateral G/E 2 Views Routine 10/3/2017 10/3/2018 10/3/2017            Who to contact     If you have questions or need follow up information about today's clinic visit or your schedule please contact [facility] [place] directly at [phone].  Normal or non-critical lab and imaging results will be communicated to you by MyChart, letter or phone within 4 business days after the clinic has received the results. If you do not hear from us within 7 days, please contact the clinic through MyChart or phone. If you have a critical or abnormal lab result, we will notify you by phone as soon as possible.  Submit refill requests through [employer] or call your pharmacy and they will forward the refill request to us. Please allow 3 business days for your refill to be  "completed.          Additional Information About Your Visit        [employer] Information     [employer] lets you send messages to your doctor, view your test results, renew your prescriptions, schedule appointments and more. To sign up, go to [email or web address] . Click on \"Log in\" on the left side of the screen, which will take you to the Welcome page. Then click on \"Sign up Now\" on the right side of the page.     You will be asked to enter the access code listed below, as well as some personal information. Please follow the directions to create your username and password.     Your access code is: [email or web address]  Expires: 2017  5:32 PM     Your access code will  in 90 days. If you need help or a new code, please call your [facility] clinic or [phone].        Care EveryWhere ID     This is your Care EveryWhere ID. This could be used by other organizations to access your [facility] medical records  [id number]        Your Vitals Were     Pulse Temperature Height Pulse Oximetry          80 97.6  F (36.4  C) (Tympanic) 5' 3.5\" (1.613 m) 97%         Blood Pressure from Last 3 Encounters:   10/03/17 122/70   17 123/74   17 120/78    Weight from Last 3 Encounters:   17 180 lb (81.6 kg)   17 188 lb 12.8 oz (85.6 kg)   16 184 lb (83.5 kg)                 Today's Medication Changes          These changes are accurate as of: 10/3/17  2:10 PM.  If you have any questions, ask your nurse or doctor.               Start taking these medicines.        Dose/Directions    fluticasone-salmeterol 500-50 MCG/DOSE diskus inhaler   Commonly known as:  ADVAIR DISKUS   Used for:  Mild intermittent asthma, unspecified whether complicated   Started by:  Chata Wallace MD        Dose:  1 puff   Inhale 1 puff into the lungs 2 times daily   Quantity:  3 Inhaler   Refills:  0            Where to get your medicines      Some of these will need a paper prescription and others can be bought over the counter.  " Ask your nurse if you have questions.     Bring a paper prescription for each of these medications     fluticasone-salmeterol 500-50 MCG/DOSE diskus inhaler                Primary Care Provider Office Phone # Fax #    Chata Wallace [phone]       [address]  [place] [address]        Equal Access to Services     [facility]: Hadii aad ku hadasho Soomaali, waaxda luqadaha, qaybta kaalmada adeegyada, waxay idiin hayaan adeeg kharash la'aan . So [facility] [phone].    ATENCIÓN: Si habla español, tiene a schaeffer disposición servicios gratuitos de asistencia lingüística. Llame al [phone].    We comply with applicable federal civil rights laws and [place] laws. We do not discriminate on the basis of race, color, national origin, age, disability, sex, sexual orientation, or gender identity.            Thank you!     Thank you for choosing [facility] [place]  for your care. Our goal is always to provide you with excellent care. Hearing back from our patients is one way we can continue to improve our services. Please take a few minutes to complete the written survey that you may receive in the mail after your visit with us. Thank you!             Your Updated Medication List - Protect others around you: Learn how to safely use, store and throw away your medicines at www.disposemymeds.org.          This list is accurate as of: 10/3/17  2:10 PM.  Always use your most recent med list.                   Brand Name Dispense Instructions for use Diagnosis    albuterol 108 (90 BASE) MCG/ACT Inhaler    PROAIR HFA/PROVENTIL HFA/VENTOLIN HFA    1 Inhaler    Inhale 2 puffs into the lungs every 4 hours as needed for shortness of breath / dyspnea or wheezing    Moderate persistent asthma without complication       blood glucose monitoring lancets     100 each    Use to test blood sugar 2 times daily or as directed.  Ok to substitute alternative if insurance prefers.    Type 2 diabetes  mellitus without complication, without long-term current use of insulin (H)       blood glucose monitoring test strip    EMILY CONTOUR NEXT    100 each    Use to test blood sugar 2 times daily or as directed.    Type 2 diabetes mellitus without complication, without long-term current use of insulin (H)       clindamycin 300 MG capsule    CLEOCIN    180 capsule    Take 1 capsule (300 mg) by mouth 3 times daily    Infected prosthetic knee joint, initial encounter (H)       escitalopram 20 MG tablet    LEXAPRO    30 tablet    Take 1 tablet (20 mg) by mouth daily    Major depression       fexofenadine-pseudoePHEDrine  MG per 12 hr tablet    ALLEGRA-D     Take 1 tablet by mouth 2 times daily as needed        fluticasone-salmeterol 500-50 MCG/DOSE diskus inhaler    ADVAIR DISKUS    3 Inhaler    Inhale 1 puff into the lungs 2 times daily    Mild intermittent asthma, unspecified whether complicated       * metFORMIN 500 MG 24 hr tablet    GLUCOPHAGE-XR    90 tablet    Take 1 tablet (500 mg) by mouth daily (with dinner)    Type 2 diabetes mellitus without complication (H)       * metFORMIN 500 MG 24 hr tablet    GLUCOPHAGE-XR    90 tablet    TAKE 1 TABLET(500 MG) BY MOUTH DAILY WITH DINNER    Type 2 diabetes mellitus without complication (H)       montelukast 10 MG tablet    SINGULAIR    90 tablet    TAKE 1 TABLET(10 MG) BY MOUTH AT BEDTIME    Moderate persistent asthma without complication       OMEGA-3 FISH OIL PO      Take 1 g by mouth daily        omeprazole 20 MG CR capsule    priLOSEC          oxyCODONE-acetaminophen 5-325 MG per tablet    PERCOCET    40 tablet    Take 1-2 tablets by mouth every 6 hours as needed for pain (moderate to severe)    Post-op pain       simvastatin 20 MG tablet    ZOCOR    90 tablet    Take 1 tablet (20 mg) by mouth At Bedtime    Hyperlipidemia LDL goal <100       VITAMIN D3 PO      Take 1,000 Units by mouth daily        zolpidem 10 MG tablet    AMBIEN    30 tablet    1 TABLET AT  BEDTIME AS NEEDED    Insomnia       * Notice:  This list has 2 medication(s) that are the same as other medications prescribed for you. Read the directions carefully, and ask your doctor or other care provider to review them with you.

## 2017-10-04 ASSESSMENT — ASTHMA QUESTIONNAIRES: ACT_TOTALSCORE: 21

## 2017-10-04 NOTE — PROGRESS NOTES
Please call the patient to notify patient that the hip x-ray shows normal findings on both sides.    X-ray shows degenerative/ age related changes in the lumbar spine.   I would recommended PT. Patient is seeing PT else where for knee pain management.   External orders for hip PT ordered. Notify patient.           Chata Wallace MD

## 2017-11-17 ENCOUNTER — OFFICE VISIT (OUTPATIENT)
Dept: ORTHOPEDICS | Facility: CLINIC | Age: 72
End: 2017-11-17
Payer: COMMERCIAL

## 2017-11-17 VITALS
SYSTOLIC BLOOD PRESSURE: 114 MMHG | DIASTOLIC BLOOD PRESSURE: 68 MMHG | BODY MASS INDEX: 30.73 KG/M2 | WEIGHT: 180 LBS | HEIGHT: 64 IN

## 2017-11-17 DIAGNOSIS — M25.561 CHRONIC PAIN OF RIGHT KNEE: Primary | ICD-10-CM

## 2017-11-17 DIAGNOSIS — G89.29 CHRONIC PAIN OF RIGHT KNEE: Primary | ICD-10-CM

## 2017-11-17 DIAGNOSIS — Z96.651 STATUS POST TOTAL RIGHT KNEE REPLACEMENT: ICD-10-CM

## 2017-11-17 DIAGNOSIS — Z96.659 INFECTION OF TOTAL KNEE REPLACEMENT, SUBSEQUENT ENCOUNTER: ICD-10-CM

## 2017-11-17 DIAGNOSIS — T84.59XD INFECTION OF TOTAL KNEE REPLACEMENT, SUBSEQUENT ENCOUNTER: ICD-10-CM

## 2017-11-17 PROCEDURE — 99024 POSTOP FOLLOW-UP VISIT: CPT | Performed by: ORTHOPAEDIC SURGERY

## 2017-11-17 RX ORDER — CLINDAMYCIN HCL 300 MG
300 CAPSULE ORAL 3 TIMES DAILY
Qty: 90 CAPSULE | Refills: 0 | Status: SHIPPED | OUTPATIENT
Start: 2017-11-17 | End: 2017-12-08

## 2017-11-17 NOTE — NURSING NOTE
"Chief Complaint   Patient presents with     RECHECK     right knee - infection concerns       Initial /68 (BP Location: Right arm, Patient Position: Sitting, Cuff Size: Adult Regular)  Ht 5' 3.5\" (1.613 m)  Wt 180 lb (81.6 kg)  BMI 31.39 kg/m2 Estimated body mass index is 31.39 kg/(m^2) as calculated from the following:    Height as of this encounter: 5' 3.5\" (1.613 m).    Weight as of this encounter: 180 lb (81.6 kg).  Medication Reconciliation: complete    "

## 2017-11-17 NOTE — PROGRESS NOTES
"HISTORY OF PRESENT ILLNESS:    Claribel Del Rio is a 72 year old female who is seen in follow up for right knee pain..  Right TKA scar tissue removal and poly exchange, DOS 9/5/17  Present symptoms: Pt states pain with the knee is along the medial aspect of the knee, under the knee cap as well as down into the shin and some posterior pain as well.  Pt states after she has been active she will have redness / warmth in the knee  Treatments tried to this point: clindimycin; surgery  Past Medical History: Unchanged from the visit of 11/25/2015. Please refer to that note.    REVIEW OF SYSTEMS:  CONSTITUTIONAL:  NEGATIVE for fever, chills, change in weight  INTEGUMENTARY/SKIN:  psoriasis  EYES:  NEGATIVE for vision changes or irritation  ENT/MOUTH:  NEGATIVE for ear, mouth and throat problems  RESP:  NEGATIVE for significant cough or SOB  BREAST:  NEGATIVE for masses, tenderness or discharge  CV:  NEGATIVE for chest pain, palpitations or peripheral edema  GI:  reflux  :  Negative   MUSCULOSKELETAL:  See HPI above  NEURO:  NEGATIVE for weakness, dizziness or paresthesias  ENDOCRINE:  NEGATIVE for temperature intolerance, skin/hair changes  HEME/ALLERGY/IMMUNE:  NEGATIVE for bleeding problems  PSYCHIATRIC:  depression    PHYSICAL EXAM:  /68 (BP Location: Right arm, Patient Position: Sitting, Cuff Size: Adult Regular)  Ht 5' 3.5\" (1.613 m)  Wt 180 lb (81.6 kg)  BMI 31.39 kg/m2  Body mass index is 31.39 kg/(m^2).   GENERAL APPEARANCE: healthy, alert and no distress   SKIN: no suspicious lesions or rashes  NEURO: Normal strength and tone, mentation intact and speech normal  VASCULAR:  good pulses, and cappillary refill   LYMPH: no lymphadenopathy   PSYCH:  mentation appears normal and affect normal/bright    MSK:  Right knee incision has healed well  Her range of motion is 0-90  With exercises, she feels range of motion is better than what it was before the operation  She is able to get up from sitting quite " well  She is walking without any noticeable limp today  No warmth is noted  No erythema is noted  Normal patellofemoral tracking    IMAGING INTERPRETATION:  None today     ASSESSMENT:  Chronic painful total arthroplasty, 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 Clinically there is no evidence of ongoing infection as was the case previously.  Will obtain sedimentation rate, CRP and d-dimer today  Follow up in six months.           Jose Alberto Gomez MD  Dept. Orthopedic Surgery  [facility]       Disclaimer: This note consists of symbols derived from keyboarding, dictation and/or voice recognition software. As a result, there may be errors in the script that have gone undetected. Please consider this when interpreting information found in this chart.

## 2017-11-17 NOTE — LETTER
"    11/17/2017         RE: Claribel Del Rio  [address] [place] [address]        Dear Colleague,    Thank you for referring your patient, Claribel Del Rio, to the [facility]. Please see a copy of my visit note below.    HISTORY OF PRESENT ILLNESS:    Claribel Del Rio is a 72 year old female who is seen in follow up for right knee pain..  Right TKA scar tissue removal and poly exchange, DOS 9/5/17  Present symptoms: Pt states pain with the knee is along the medial aspect of the knee, under the knee cap as well as down into the shin and some posterior pain as well.  Pt states after she has been active she will have redness / warmth in the knee  Treatments tried to this point: clindimycin; surgery  Past Medical History: Unchanged from the visit of 11/25/2015. Please refer to that note.    REVIEW OF SYSTEMS:  CONSTITUTIONAL:  NEGATIVE for fever, chills, change in weight  INTEGUMENTARY/SKIN:  psoriasis  EYES:  NEGATIVE for vision changes or irritation  ENT/MOUTH:  NEGATIVE for ear, mouth and throat problems  RESP:  NEGATIVE for significant cough or SOB  BREAST:  NEGATIVE for masses, tenderness or discharge  CV:  NEGATIVE for chest pain, palpitations or peripheral edema  GI:  reflux  :  Negative   MUSCULOSKELETAL:  See HPI above  NEURO:  NEGATIVE for weakness, dizziness or paresthesias  ENDOCRINE:  NEGATIVE for temperature intolerance, skin/hair changes  HEME/ALLERGY/IMMUNE:  NEGATIVE for bleeding problems  PSYCHIATRIC:  depression    PHYSICAL EXAM:  /68 (BP Location: Right arm, Patient Position: Sitting, Cuff Size: Adult Regular)  Ht 5' 3.5\" (1.613 m)  Wt 180 lb (81.6 kg)  BMI 31.39 kg/m2  Body mass index is 31.39 kg/(m^2).   GENERAL APPEARANCE: healthy, alert and no distress   SKIN: no suspicious lesions or rashes  NEURO: Normal strength and tone, mentation intact and speech normal  VASCULAR:  good pulses, and cappillary refill   LYMPH: no lymphadenopathy   PSYCH:  mentation " appears normal and affect normal/bright    MSK:  Right knee incision has healed well  Her range of motion is 0-90  With exercises, she feels range of motion is better than what it was before the operation  She is able to get up from sitting quite well  She is walking without any noticeable limp today  No warmth is noted  No erythema is noted  Normal patellofemoral tracking    IMAGING INTERPRETATION:  None today     ASSESSMENT:  Chronic painful total arthroplasty, 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 Clinically there is no evidence of ongoing infection as was the case previously.  Will obtain sedimentation rate, CRP and d-dimer today  Follow up in six months.           Jose Alberto Gomez MD  Dept. Orthopedic Surgery  [facility]       Disclaimer: This note consists of symbols derived from keyboarding, dictation and/or voice recognition software. As a result, there may be errors in the script that have gone undetected. Please consider this when interpreting information found in this chart.      Again, thank you for allowing me to participate in the care of your patient.        Sincerely,        Jose Alberto Gomez MD

## 2017-11-17 NOTE — MR AVS SNAPSHOT
After Visit Summary   11/17/2017    Claribel Del Rio    MRN: [id number]           Patient Information     Date Of Birth          1945        Visit Information        Provider Department      11/17/2017 10:10 AM Jose Alberto Gomez MD [facility] ORTHOPEDIC SURGERY        Today's Diagnoses     Chronic pain of right knee    -  1    Status post total right knee replacement        Infection of total knee replacement, subsequent encounter           Follow-ups after your visit        Your next 10 appointments already scheduled     Dec 08, 2017 11:00 AM CST   Office Visit with Chata Wallace MD   [facility] ([facility])    [address] [place] [id number] [phone]           Bring a current list of meds and any records pertaining to this visit. For Physicals, please bring immunization records and any forms needing to be filled out. Please arrive 10 minutes early to complete paperwork.              Future tests that were ordered for you today     Open Future Orders        Priority Expected Expires Ordered    CRP inflammation Routine  11/17/2018 11/17/2017    D dimer quantitative Routine  11/17/2018 11/17/2017    Erythrocyte sedimentation rate auto Routine  11/17/2018 11/17/2017            Who to contact     If you have questions or need follow up information about today's clinic visit or your schedule please contact [facility] ORTHOPEDIC SURGERY directly at [phone].  Normal or non-critical lab and imaging results will be communicated to you by MyChart, letter or phone within 4 business days after the clinic has received the results. If you do not hear from us within 7 days, please contact the clinic through MyChart or phone. If you have a critical or abnormal lab result, we will notify you by phone as soon as possible.  Submit refill requests through [employer] or call your pharmacy and they will forward the refill request to us.  "Please allow 3 business days for your refill to be completed.          Additional Information About Your Visit        MyChart Information     [employer] lets you send messages to your doctor, view your test results, renew your prescriptions, schedule appointments and more. To sign up, go to [email or web address] . Click on \"Log in\" on the left side of the screen, which will take you to the Welcome page. Then click on \"Sign up Now\" on the right side of the page.     You will be asked to enter the access code listed below, as well as some personal information. Please follow the directions to create your username and password.     Your access code is: [email or web address]  Expires: 2017  4:32 PM     Your access code will  in 90 days. If you need help or a new code, please call your [facility] clinic or [phone].        Care EveryWhere ID     This is your Care EveryWhere ID. This could be used by other organizations to access your [facility] medical records  [id number]        Your Vitals Were     Height BMI (Body Mass Index)                5' 3.5\" (1.613 m) 31.39 kg/m2           Blood Pressure from Last 3 Encounters:   17 114/68   10/03/17 122/70   17 123/74    Weight from Last 3 Encounters:   17 180 lb (81.6 kg)   17 180 lb (81.6 kg)   17 188 lb 12.8 oz (85.6 kg)                 Today's Medication Changes          These changes are accurate as of: 17 10:51 AM.  If you have any questions, ask your nurse or doctor.               These medicines have changed or have updated prescriptions.        Dose/Directions    * clindamycin 300 MG capsule   Commonly known as:  CLEOCIN   This may have changed:  Another medication with the same name was added. Make sure you understand how and when to take each.   Used for:  Infected prosthetic knee joint, initial encounter (H)   Changed by:  Jose Alberto Gomez MD        Dose:  300 mg   Take 1 capsule (300 mg) by mouth 3 times daily   Quantity:  180 " capsule   Refills:  1       * clindamycin 300 MG capsule   Commonly known as:  CLEOCIN   This may have changed:  You were already taking a medication with the same name, and this prescription was added. Make sure you understand how and when to take each.   Used for:  Infection of total knee replacement, subsequent encounter   Changed by:  Jose Alberto Gomez MD        Dose:  300 mg   Take 1 capsule (300 mg) by mouth 3 times daily   Quantity:  90 capsule   Refills:  0       * Notice:  This list has 2 medication(s) that are the same as other medications prescribed for you. Read the directions carefully, and ask your doctor or other care provider to review them with you.         Where to get your medicines      These medications were sent to [facility] [id number]  [place] - [address]   [address] [address]     Phone:  [phone]     clindamycin 300 MG capsule                Primary Care Provider Office Phone # Fax #    Chata Wallace [phone]       [address]  [place] [address]        Equal Access to Services     [facility]: Hadii anderson love hadasho Soclare, waaxda luqadaha, qaybta kaalmada adefran, thalia becerra . So [facility] [phone].    ATENCIÓN: Si habla español, tiene a schaeffer disposición servicios gratuitos de asistencia lingüística. [facility] [phone].    We comply with applicable federal civil rights laws and [place] laws. We do not discriminate on the basis of race, color, national origin, age, disability, sex, sexual orientation, or gender identity.            Thank you!     Thank you for choosing [facility] ORTHOPEDIC SURGERY  for your care. Our goal is always to provide you with excellent care. Hearing back from our patients is one way we can continue to improve our services. Please take a few minutes to complete the written survey that you may receive in the mail after your visit  with us. Thank you!             Your Updated Medication List - Protect others around you: Learn how to safely use, store and throw away your medicines at www.disposemymeds.org.          This list is accurate as of: 11/17/17 10:51 AM.  Always use your most recent med list.                   Brand Name Dispense Instructions for use Diagnosis    albuterol 108 (90 BASE) MCG/ACT Inhaler    PROAIR HFA/PROVENTIL HFA/VENTOLIN HFA    1 Inhaler    Inhale 2 puffs into the lungs every 4 hours as needed for shortness of breath / dyspnea or wheezing    Moderate persistent asthma without complication       blood glucose monitoring lancets     100 each    Use to test blood sugar 2 times daily or as directed.  Ok to substitute alternative if insurance prefers.    Type 2 diabetes mellitus without complication, without long-term current use of insulin (H)       blood glucose monitoring test strip    EMILY CONTOUR NEXT    100 each    Use to test blood sugar 2 times daily or as directed.    Type 2 diabetes mellitus without complication, without long-term current use of insulin (H)       * clindamycin 300 MG capsule    CLEOCIN    180 capsule    Take 1 capsule (300 mg) by mouth 3 times daily    Infected prosthetic knee joint, initial encounter (H)       * clindamycin 300 MG capsule    CLEOCIN    90 capsule    Take 1 capsule (300 mg) by mouth 3 times daily    Infection of total knee replacement, subsequent encounter       escitalopram 20 MG tablet    LEXAPRO    30 tablet    Take 1 tablet (20 mg) by mouth daily    Major depression       fexofenadine-pseudoePHEDrine  MG per 12 hr tablet    ALLEGRA-D     Take 1 tablet by mouth 2 times daily as needed        fluticasone-salmeterol 500-50 MCG/DOSE diskus inhaler    ADVAIR DISKUS    3 Inhaler    Inhale 1 puff into the lungs 2 times daily    Mild intermittent asthma, unspecified whether complicated       * metFORMIN 500 MG 24 hr tablet    GLUCOPHAGE-XR    90 tablet    Take 1 tablet (500 mg)  by mouth daily (with dinner)    Type 2 diabetes mellitus without complication (H)       * metFORMIN 500 MG 24 hr tablet    GLUCOPHAGE-XR    90 tablet    TAKE 1 TABLET(500 MG) BY MOUTH DAILY WITH DINNER    Type 2 diabetes mellitus without complication (H)       montelukast 10 MG tablet    SINGULAIR    90 tablet    TAKE 1 TABLET(10 MG) BY MOUTH AT BEDTIME    Moderate persistent asthma without complication       OMEGA-3 FISH OIL PO      Take 1 g by mouth daily        omeprazole 20 MG CR capsule    priLOSEC          oxyCODONE-acetaminophen 5-325 MG per tablet    PERCOCET    40 tablet    Take 1-2 tablets by mouth every 6 hours as needed for pain (moderate to severe)    Post-op pain       simvastatin 20 MG tablet    ZOCOR    90 tablet    Take 1 tablet (20 mg) by mouth At Bedtime    Hyperlipidemia LDL goal <100       VITAMIN D3 PO      Take 1,000 Units by mouth daily        zolpidem 10 MG tablet    AMBIEN    30 tablet    1 TABLET AT BEDTIME AS NEEDED    Insomnia       * Notice:  This list has 4 medication(s) that are the same as other medications prescribed for you. Read the directions carefully, and ask your doctor or other care provider to review them with you.

## 2017-12-02 DIAGNOSIS — E11.9 TYPE 2 DIABETES MELLITUS WITHOUT COMPLICATION (H): ICD-10-CM

## 2017-12-04 RX ORDER — METFORMIN HCL 500 MG
TABLET, EXTENDED RELEASE 24 HR ORAL
Qty: 90 TABLET | Refills: 0 | Status: SHIPPED | OUTPATIENT
Start: 2017-12-04 | End: 2017-12-08

## 2017-12-04 NOTE — TELEPHONE ENCOUNTER
Metformin       Last Written Prescription Date: 9/6/17  Last Fill Quantity: 90,  # refills: 0   Last Office Visit with G, P or [facility] prescribing provider: 10/3/17                                         Next 5 appointments (look out 90 days)     Dec 08, 2017 11:00 AM CST   Office Visit with Chata Wallace MD   [facility] ([facility])    [address] [place] [id number] [phone]                  Requested Prescriptions   Pending Prescriptions Disp Refills     metFORMIN (GLUCOPHAGE-XR) 500 MG 24 hr tablet [Pharmacy Med Name: METFORMIN ER 500MG 24HR TABS] 90 tablet 0     Sig: TAKE 1 TABLET(500 MG) BY MOUTH DAILY WITH DINNER    Biguanide Agents Failed    12/2/2017  3:24 AM       Failed - Patient has had a Microalbumin in the past 12 mos.    Recent Labs   Lab Test  07/22/16   1100   MICROL  13   UMALCR  7.26            Passed - Patient's BP is less than 140/90    BP Readings from Last 3 Encounters:   11/17/17 114/68   10/03/17 122/70   09/05/17 123/74                Passed - Patient has documented LDL within the past 12 mos.    Recent Labs   Lab Test  12/09/16   1149   LDL  83            Passed - Patient is age 10 or older       Passed - Patient has documented A1c within the specified period of time.    Recent Labs   Lab Test  08/30/17   1228   A1C  6.2*            Passed - Patient's CR is NOT>1.4 OR Patient's EGFR is NOT<45 within past 12 mos.    Recent Labs   Lab Test  08/30/17   1228   GFRESTIMATED  83   GFRESTBLACK  >90       Recent Labs   Lab Test  08/30/17   1228   CR  0.70            Passed - Patient does NOT have a diagnosis of CHF.       Passed - Patient is not pregnant       Passed - Patient has not had a positive pregnancy test within the past 12 mos.        Passed - Recent (6 mos) or future visit with authorizing provider's specialty    Patient had office visit in the last 6 months or has a visit in the next 30 days with authorizing  provider.  See chart review.             PHQ-9 SCORE 12/9/2016 6/7/2017 8/30/2017   Total Score - - -   Total Score 3 1 9     ACT Total Scores 10/3/2017   ACT TOTAL SCORE -   ASTHMA ER VISITS -   ASTHMA HOSPITALIZATIONS -   ACT TOTAL SCORE (Goal Greater than or Equal to 20) 21   In the past 12 months, how many times did you visit the emergency room for your asthma without being admitted to the hospital? 0   In the past 12 months, how many times were you hospitalized overnight because of your asthma? 0

## 2017-12-04 NOTE — TELEPHONE ENCOUNTER
Refill approved through [facility] protocol. patient has appointment scheduled  Araceli Soares RN  [facility]  [phone]

## 2017-12-08 ENCOUNTER — OFFICE VISIT (OUTPATIENT)
Dept: FAMILY MEDICINE | Facility: CLINIC | Age: 72
End: 2017-12-08
Payer: COMMERCIAL

## 2017-12-08 VITALS
SYSTOLIC BLOOD PRESSURE: 128 MMHG | HEIGHT: 64 IN | HEART RATE: 80 BPM | DIASTOLIC BLOOD PRESSURE: 66 MMHG | TEMPERATURE: 97.2 F | RESPIRATION RATE: 16 BRPM | OXYGEN SATURATION: 98 % | WEIGHT: 180 LBS | BODY MASS INDEX: 30.73 KG/M2

## 2017-12-08 DIAGNOSIS — J45.20 MILD INTERMITTENT ASTHMA, UNSPECIFIED WHETHER COMPLICATED: ICD-10-CM

## 2017-12-08 DIAGNOSIS — E11.9 TYPE 2 DIABETES MELLITUS WITHOUT COMPLICATION, WITHOUT LONG-TERM CURRENT USE OF INSULIN (H): ICD-10-CM

## 2017-12-08 DIAGNOSIS — Z00.00 ROUTINE GENERAL MEDICAL EXAMINATION AT A HEALTH CARE FACILITY: Primary | ICD-10-CM

## 2017-12-08 DIAGNOSIS — K21.9 GASTROESOPHAGEAL REFLUX DISEASE WITHOUT ESOPHAGITIS: ICD-10-CM

## 2017-12-08 DIAGNOSIS — M25.561 CHRONIC PAIN OF RIGHT KNEE: ICD-10-CM

## 2017-12-08 DIAGNOSIS — E78.5 HYPERLIPIDEMIA LDL GOAL <100: ICD-10-CM

## 2017-12-08 DIAGNOSIS — Z96.651 STATUS POST TOTAL RIGHT KNEE REPLACEMENT: ICD-10-CM

## 2017-12-08 DIAGNOSIS — G89.29 CHRONIC PAIN OF RIGHT KNEE: ICD-10-CM

## 2017-12-08 LAB
CRP SERPL-MCNC: 25 MG/L (ref 0–8)
D DIMER PPP FEU-MCNC: 1.8 UG/ML FEU (ref 0–0.5)
ERYTHROCYTE [SEDIMENTATION RATE] IN BLOOD BY WESTERGREN METHOD: 20 MM/H (ref 0–30)
HBA1C MFR BLD: 6.2 % (ref 4.3–6)
HGB BLD-MCNC: 13.2 G/DL (ref 11.7–15.7)

## 2017-12-08 PROCEDURE — 85652 RBC SED RATE AUTOMATED: CPT | Performed by: ORTHOPAEDIC SURGERY

## 2017-12-08 PROCEDURE — 80061 LIPID PANEL: CPT | Performed by: FAMILY MEDICINE

## 2017-12-08 PROCEDURE — 82043 UR ALBUMIN QUANTITATIVE: CPT | Performed by: FAMILY MEDICINE

## 2017-12-08 PROCEDURE — 86140 C-REACTIVE PROTEIN: CPT | Performed by: ORTHOPAEDIC SURGERY

## 2017-12-08 PROCEDURE — 85379 FIBRIN DEGRADATION QUANT: CPT | Performed by: ORTHOPAEDIC SURGERY

## 2017-12-08 PROCEDURE — 84443 ASSAY THYROID STIM HORMONE: CPT | Performed by: FAMILY MEDICINE

## 2017-12-08 PROCEDURE — 99397 PER PM REEVAL EST PAT 65+ YR: CPT | Performed by: FAMILY MEDICINE

## 2017-12-08 PROCEDURE — 83036 HEMOGLOBIN GLYCOSYLATED A1C: CPT | Performed by: FAMILY MEDICINE

## 2017-12-08 PROCEDURE — 80053 COMPREHEN METABOLIC PANEL: CPT | Performed by: FAMILY MEDICINE

## 2017-12-08 PROCEDURE — 36415 COLL VENOUS BLD VENIPUNCTURE: CPT | Performed by: FAMILY MEDICINE

## 2017-12-08 PROCEDURE — 85018 HEMOGLOBIN: CPT | Performed by: FAMILY MEDICINE

## 2017-12-08 RX ORDER — SIMVASTATIN 20 MG
20 TABLET ORAL AT BEDTIME
Qty: 90 TABLET | Refills: 1 | Status: SHIPPED | OUTPATIENT
Start: 2017-12-08 | End: 2018-07-17

## 2017-12-08 RX ORDER — METFORMIN HCL 500 MG
500 TABLET, EXTENDED RELEASE 24 HR ORAL
Qty: 90 TABLET | Refills: 3 | Status: SHIPPED | OUTPATIENT
Start: 2017-12-08 | End: 2018-12-10

## 2017-12-08 RX ORDER — MONTELUKAST SODIUM 10 MG/1
TABLET ORAL
Qty: 90 TABLET | Refills: 3 | Status: SHIPPED | OUTPATIENT
Start: 2017-12-08 | End: 2018-12-10

## 2017-12-08 ASSESSMENT — ANXIETY QUESTIONNAIRES
IF YOU CHECKED OFF ANY PROBLEMS ON THIS QUESTIONNAIRE, HOW DIFFICULT HAVE THESE PROBLEMS MADE IT FOR YOU TO DO YOUR WORK, TAKE CARE OF THINGS AT HOME, OR GET ALONG WITH OTHER PEOPLE: NOT DIFFICULT AT ALL
2. NOT BEING ABLE TO STOP OR CONTROL WORRYING: SEVERAL DAYS
1. FEELING NERVOUS, ANXIOUS, OR ON EDGE: SEVERAL DAYS
GAD7 TOTAL SCORE: 5
6. BECOMING EASILY ANNOYED OR IRRITABLE: NOT AT ALL
3. WORRYING TOO MUCH ABOUT DIFFERENT THINGS: SEVERAL DAYS
5. BEING SO RESTLESS THAT IT IS HARD TO SIT STILL: NOT AT ALL
7. FEELING AFRAID AS IF SOMETHING AWFUL MIGHT HAPPEN: SEVERAL DAYS

## 2017-12-08 ASSESSMENT — PATIENT HEALTH QUESTIONNAIRE - PHQ9
SUM OF ALL RESPONSES TO PHQ QUESTIONS 1-9: 2
5. POOR APPETITE OR OVEREATING: SEVERAL DAYS

## 2017-12-08 NOTE — LETTER
December 10, 2017      Claribel Del Rio  [address] [place] [address]        Dear ,    I have reviewed your recent labs. Here are the results:    -Liver and gallbladder tests are normal. (ALT,AST, Alk phos, bilirubin), kidney function is normal (Cr, GFR), Sodium is normal, Potassium is normal, Calcium is normal  -Cholesterol levels (LDL,HDL, Triglycerides) are normal.  ADVISE: rechecking in 1 year.  -TSH (thyroid stimulating hormone) level is normal which indicates normal thyroid function.  -Hemoglobin is normal.  There is no evidence of anemia.  - Diabetes is well controlled. Recheck A1c in 6 months recommended.   -Microalbumin (urine protein) test is normal.  ADVISE: recheck annually        Resulted Orders   TSH with free T4 reflex   Result Value Ref Range    TSH 1.27 0.40 - 4.00 mU/L   Lipid Profile   Result Value Ref Range    Cholesterol 169 <200 mg/dL    Triglycerides 90 <150 mg/dL      Comment:      Fasting specimen    HDL Cholesterol 61 >49 mg/dL    LDL Cholesterol Calculated 90 <100 mg/dL      Comment:      Desirable:       <100 mg/dl    Non HDL Cholesterol 108 <130 mg/dL   Comprehensive metabolic panel   Result Value Ref Range    Sodium 143 133 - 144 mmol/L    Potassium 3.5 3.4 - 5.3 mmol/L    Chloride 107 94 - 109 mmol/L    Carbon Dioxide 27 20 - 32 mmol/L    Anion Gap 9 3 - 14 mmol/L    Glucose 127 (H) 70 - 99 mg/dL      Comment:      Fasting specimen    Urea Nitrogen 26 7 - 30 mg/dL    Creatinine 0.68 0.52 - 1.04 mg/dL    GFR Estimate 86 >60 mL/min/1.7m2      Comment:      Non  GFR Calc    GFR Estimate If Black >90 >60 mL/min/1.7m2      Comment:       GFR Calc    Calcium 9.1 8.5 - 10.1 mg/dL    Bilirubin Total 0.7 0.2 - 1.3 mg/dL    Albumin 3.8 3.4 - 5.0 g/dL    Protein Total 7.4 6.8 - 8.8 g/dL    Alkaline Phosphatase 101 40 - 150 U/L    ALT 24 0 - 50 U/L    AST 15 0 - 45 U/L   Hemoglobin   Result Value Ref Range    Hemoglobin 13.2 11.7 - 15.7 g/dL    Hemoglobin A1c   Result Value Ref Range    Hemoglobin A1C 6.2 (H) 4.3 - 6.0 %   Albumin Random Urine Quantitative with Creat Ratio   Result Value Ref Range    Creatinine Urine 150 mg/dL    Albumin Urine mg/L 12 mg/L    Albumin Urine mg/g Cr 8.33 0 - 25 mg/g Cr       If you have any questions or concerns, please call the clinic at the number listed above.       Sincerely,        Chata Wallace MD

## 2017-12-08 NOTE — MR AVS SNAPSHOT
After Visit Summary   12/8/2017    Claribel Del Rio    MRN: [id number]           Patient Information     Date Of Birth          1945        Visit Information        Provider Department      12/8/2017 11:00 AM [facility]        Today's Diagnoses     Routine general medical examination at a health care facility    -  1    Hyperlipidemia LDL goal <100        Mild intermittent asthma, unspecified whether complicated        Type 2 diabetes mellitus without complication, without long-term current use of insulin (H)        Gastroesophageal reflux disease without esophagitis          Care Instructions      Preventive Health Recommendations  Female Ages 65 +    Yearly exam:     See your health care provider every year in order to  o Review health changes.   o Discuss preventive care.    o Review your medicines if your doctor has prescribed any.      You no longer need a yearly Pap test unless you've had an abnormal Pap test in the past 10 years. If you have vaginal symptoms, such as bleeding or discharge, be sure to talk with your provider about a Pap test.      Every 1 to 2 years, have a mammogram.  If you are over 69, talk with your health care provider about whether or not you want to continue having screening mammograms.      Every 10 years, have a colonoscopy. Or, have a yearly FIT test (stool test). These exams will check for colon cancer.       Have a cholesterol test every 5 years, or more often if your doctor advises it.       Have a diabetes test (fasting glucose) every three years. If you are at risk for diabetes, you should have this test more often.       At age 65, have a bone density scan (DEXA) to check for osteoporosis (brittle bone disease).    Shots:    Get a flu shot each year.    Get a tetanus shot every 10 years.    Talk to your doctor about your pneumonia vaccines. There are now two you should receive - Pneumovax (PPSV 23) and Prevnar (PCV  "13).    Talk to your doctor about the shingles vaccine.    Talk to your doctor about the hepatitis B vaccine.    Nutrition:     Eat at least 5 servings of fruits and vegetables each day.      Eat whole-grain bread, whole-wheat pasta and brown rice instead of white grains and rice.      Talk to your provider about Calcium and Vitamin D.     Lifestyle    Exercise at least 150 minutes a week (30 minutes a day, 5 days a week). This will help you control your weight and prevent disease.      Limit alcohol to one drink per day.      No smoking.       Wear sunscreen to prevent skin cancer.       See your dentist twice a year for an exam and cleaning.      See your eye doctor every 1 to 2 years to screen for conditions such as glaucoma, macular degeneration, cataracts, etc           Follow-ups after your visit        Follow-up notes from your care team     Return in about 1 year (around 12/8/2018) for Annual Physical Exam.      Who to contact     If you have questions or need follow up information about today's clinic visit or your schedule please contact [facility] [place] directly at [phone].  Normal or non-critical lab and imaging results will be communicated to you by [employer], letter or phone within 4 business days after the clinic has received the results. If you do not hear from us within 7 days, please contact the clinic through [employer] or phone. If you have a critical or abnormal lab result, we will notify you by phone as soon as possible.  Submit refill requests through [employer] or call your pharmacy and they will forward the refill request to us. Please allow 3 business days for your refill to be completed.          Additional Information About Your Visit        [employer] Information     [employer] lets you send messages to your doctor, view your test results, renew your prescriptions, schedule appointments and more. To sign up, go to [email or web address] . Click on \"Log in\" on the left side of the " "screen, which will take you to the Welcome page. Then click on \"Sign up Now\" on the right side of the page.     You will be asked to enter the access code listed below, as well as some personal information. Please follow the directions to create your username and password.     Your access code is: [email or web address]  Expires: 3/8/2018 11:33 AM     Your access code will  in 90 days. If you need help or a new code, please call your [facility] clinic or [phone].        Care EveryWhere ID     This is your Care EveryWhere ID. This could be used by other organizations to access your [facility] medical records  [id number]        Your Vitals Were     Pulse Temperature Respirations Height Pulse Oximetry BMI (Body Mass Index)    80 97.2  F (36.2  C) 16 5' 3.5\" (1.613 m) 98% 31.39 kg/m2       Blood Pressure from Last 3 Encounters:   17 128/66   17 114/68   10/03/17 122/70    Weight from Last 3 Encounters:   17 180 lb (81.6 kg)   17 180 lb (81.6 kg)   17 180 lb (81.6 kg)              We Performed the Following     Albumin Random Urine Quantitative with Creat Ratio     Comprehensive metabolic panel     FOOT EXAM     Hemoglobin A1c     Hemoglobin     Lipid Profile     TSH with free T4 reflex          Today's Medication Changes          These changes are accurate as of: 17 11:33 AM.  If you have any questions, ask your nurse or doctor.               Start taking these medicines.        Dose/Directions    CONTOUR NEXT EZ MONITOR W/DEVICE Kit   Used for:  Type 2 diabetes mellitus without complication, without long-term current use of insulin (H)   Started by:  Chata Wallace MD        Dose:  1 each   1 each daily as needed   Quantity:  1 kit   Refills:  0       fluticasone-salmeterol 500-50 MCG/DOSE diskus inhaler   Commonly known as:  ADVAIR DISKUS   Used for:  Mild intermittent asthma, unspecified whether complicated   Started by:  Chata Wallace MD        Dose:  1 puff   Inhale 1 puff into the " lungs 2 times daily   Quantity:  3 Inhaler   Refills:  3         These medicines have changed or have updated prescriptions.        Dose/Directions    montelukast 10 MG tablet   Commonly known as:  SINGULAIR   This may have changed:  See the new instructions.   Used for:  Mild intermittent asthma, unspecified whether complicated   Changed by:  Chata Wallace MD        TAKE 1 TABLET(10 MG) BY MOUTH AT BEDTIME   Quantity:  90 tablet   Refills:  3       omeprazole 20 MG CR capsule   Commonly known as:  priLOSEC   This may have changed:    - how much to take  - how to take this  - when to take this   Used for:  Gastroesophageal reflux disease without esophagitis   Changed by:  Chata Wallace MD        Dose:  20 mg   Take 1 capsule (20 mg) by mouth daily   Quantity:  90 capsule   Refills:  3         Stop taking these medicines if you haven't already. Please contact your care team if you have questions.     OMEGA-3 FISH OIL PO   Stopped by:  Chata Wallace MD                Where to get your medicines      These medications were sent to [facility] [address] [facility] [place] - [address] [facility] [address]   [address] [place] [address]     Phone:  [phone]     CONTOUR NEXT EZ MONITOR W/DEVICE Kit    fluticasone-salmeterol 500-50 MCG/DOSE diskus inhaler    metFORMIN 500 MG 24 hr tablet    montelukast 10 MG tablet    omeprazole 20 MG CR capsule    simvastatin 20 MG tablet                Primary Care Provider Office Phone # Fax #    Chata Wallace [phone]       [address] [place] [address]        Equal Access to Services     [facility] AH: Hadii anderson love hadashsully [employer], waaxda luqadaha, qaybta kaalmada adefran, thalia márquez. So [facility] [phone].    ATENCIÓN: Si habla español, tiene a schaeffer disposición servicios gratuitos de asistencia lingüística. Llame al [phone].    We comply with applicable federal civil rights laws and  [place] laws. We do not discriminate on the basis of race, color, national origin, age, disability, sex, sexual orientation, or gender identity.            Thank you!     Thank you for choosing [facility]  for your care. Our goal is always to provide you with excellent care. Hearing back from our patients is one way we can continue to improve our services. Please take a few minutes to complete the written survey that you may receive in the mail after your visit with us. Thank you!             Your Updated Medication List - Protect others around you: Learn how to safely use, store and throw away your medicines at www.disposemymeds.org.          This list is accurate as of: 12/8/17 11:33 AM.  Always use your most recent med list.                   Brand Name Dispense Instructions for use Diagnosis    albuterol 108 (90 BASE) MCG/ACT Inhaler    PROAIR HFA/PROVENTIL HFA/VENTOLIN HFA    1 Inhaler    Inhale 2 puffs into the lungs every 4 hours as needed for shortness of breath / dyspnea or wheezing    Moderate persistent asthma without complication       blood glucose monitoring lancets     100 each    Use to test blood sugar 2 times daily or as directed.  Ok to substitute alternative if insurance prefers.    Type 2 diabetes mellitus without complication, without long-term current use of insulin (H)       blood glucose monitoring test strip    EMILY CONTOUR NEXT    100 each    Use to test blood sugar 2 times daily or as directed.    Type 2 diabetes mellitus without complication, without long-term current use of insulin (H)       clindamycin 300 MG capsule    CLEOCIN    180 capsule    Take 1 capsule (300 mg) by mouth 3 times daily    Infected prosthetic knee joint, initial encounter (H)       CONTOUR NEXT EZ MONITOR W/DEVICE Kit     1 kit    1 each daily as needed    Type 2 diabetes mellitus without complication, without long-term current use of insulin (H)       escitalopram 20 MG tablet    LEXAPRO     30 tablet    Take 1 tablet (20 mg) by mouth daily    Major depression       fexofenadine-pseudoePHEDrine  MG per 12 hr tablet    ALLEGRA-D     Take 1 tablet by mouth 2 times daily as needed        fluticasone-salmeterol 500-50 MCG/DOSE diskus inhaler    ADVAIR DISKUS    3 Inhaler    Inhale 1 puff into the lungs 2 times daily    Mild intermittent asthma, unspecified whether complicated       metFORMIN 500 MG 24 hr tablet    GLUCOPHAGE-XR    90 tablet    Take 1 tablet (500 mg) by mouth daily (with dinner)    Type 2 diabetes mellitus without complication, without long-term current use of insulin (H)       montelukast 10 MG tablet    SINGULAIR    90 tablet    TAKE 1 TABLET(10 MG) BY MOUTH AT BEDTIME    Mild intermittent asthma, unspecified whether complicated       omeprazole 20 MG CR capsule    priLOSEC    90 capsule    Take 1 capsule (20 mg) by mouth daily    Gastroesophageal reflux disease without esophagitis       simvastatin 20 MG tablet    ZOCOR    90 tablet    Take 1 tablet (20 mg) by mouth At Bedtime    Hyperlipidemia LDL goal <100       VITAMIN D3 PO      Take 1,000 Units by mouth daily        zolpidem 10 MG tablet    AMBIEN    30 tablet    1 TABLET AT BEDTIME AS NEEDED    Insomnia

## 2017-12-08 NOTE — PROGRESS NOTES
"Chief Complaint   Patient presents with     Physical       Initial /66  Pulse 80  Temp 97.2  F (36.2  C)  Resp 16  Ht 5' 3.5\" (1.613 m)  Wt 180 lb (81.6 kg)  SpO2 98%  BMI 31.39 kg/m2 Estimated body mass index is 31.39 kg/(m^2) as calculated from the following:    Height as of this encounter: 5' 3.5\" (1.613 m).    Weight as of this encounter: 180 lb (81.6 kg).  Medication Reconciliation: complete. RAPHAEL Skaggs LPN          SUBJECTIVE:   Claribel Del Rio is a 72 year old female who presents for Preventive Visit.      Are you in the first 12 months of your Medicare Part B coverage?  No    Healthy Habits:    Do you get at least three servings of calcium containing foods daily (dairy, green leafy vegetables, etc.)? yes    Amount of exercise or daily activities, outside of work: 0 day(s) per week    Problems taking medications regularly No    Medication side effects: No    Have you had an eye exam in the past two years? yes    Do you see a dentist twice per year? no  Do you have sleep apnea, excessive snoring or daytime drowsiness?yes - Has CPAP - doesn't use      COGNITIVE SCREEN  1) Repeat 3 items (Banana, Sunrise, Chair)    2) Clock draw: NORMAL  3) 3 item recall: Recalls 3 objects  Results: 3 items recalled: COGNITIVE IMPAIRMENT LESS LIKELY    Mini-CogTM Copyright S Jaylyn. Licensed by the author for use in [facility]; reprinted with permission ([email or web address]). All rights reserved.                Diabetes Follow-up        Diabetic concerns: None     Symptoms of hypoglycemia (low blood sugar): none     Paresthesias (numbness or burning in feet) or sores: No     Date of last diabetic eye exam:         BP Readings from Last 2 Encounters:   12/08/17 128/66   11/17/17 114/68     Hemoglobin A1C (%)   Date Value   12/08/2017 6.2 (H)   08/30/2017 6.2 (H)     LDL Cholesterol Calculated (mg/dL)   Date Value   12/08/2017 90   12/09/2016 83   Hyperlipidemia Follow-Up      Rate your low fat/cholesterol " diet?: good    Taking statin?  Yes, no muscle aches from statin    Other lipid medications/supplements?:  none    Asthma Follow-Up    Was ACT completed today?    Yes    ACT Total Scores 12/8/2017   ACT TOTAL SCORE -   ASTHMA ER VISITS -   ASTHMA HOSPITALIZATIONS -   ACT TOTAL SCORE (Goal Greater than or Equal to 20) 22   In the past 12 months, how many times did you visit the emergency room for your asthma without being admitted to the hospital? 0   In the past 12 months, how many times were you hospitalized overnight because of your asthma? 0       Recent asthma triggers that patient is dealing with:               Reviewed and updated as needed this visit by clinical staffTobacco  Allergies  Meds  Fam Hx  Soc Hx        Reviewed and updated as needed this visit by Provider  Allergies        Social History   Substance Use Topics     Smoking status: Former Smoker     Packs/day: 0.00     Years: 6.00     Types: Cigarettes     Quit date: 6/9/1974     Smokeless tobacco: Never Used      Comment: Previous smoker, 1-1.5 packs per week     Alcohol use 2.4 - 3.6 oz/week     4 - 6 Standard drinks or equivalent per week      Comment: 1 drnk monthly       The patient does not drink >3 drinks per day nor >7 drinks per week.     Today's PHQ-2 Score:   PHQ-2 ( 1999 Pfizer) 7/22/2016 5/19/2016   Q1: Little interest or pleasure in doing things 0 0   Q2: Feeling down, depressed or hopeless 0 0   PHQ-2 Score 0 0         Do you feel safe in your environment - Yes    Do you have a Health Care Directive?: No: Advance care planning reviewed with patient; information given to patient to review.      Current providers sharing in care for this patient include: Patient Care Team:  Chata Wallace MD as PCP - General (Family Practice)      Hearing impairment: No    Ability to successfully perform activities of daily living: Yes, no assistance needed     Fall risk:  Fallen 2 or more times in the past year?: No  Any fall with injury in the past  year?: Yes      Home safety:  none identified      The following health maintenance items are reviewed in Epic and correct as of today:  Health Maintenance   Topic Date Due     WELLNESS VISIT Q1 YR  07/22/2017     EYE EXAM Q1 YEAR  12/20/2017     DEPRESSION ACTION PLAN Q1 YR  06/07/2018     ASTHMA CONTROL TEST Q6 MOS  06/08/2018     A1C Q6 MO  06/08/2018     PHQ-9 Q6 MONTHS  06/08/2018     MAMMO SCREEN Q2 YR (SYSTEM ASSIGNED)  07/29/2018     ASTHMA ACTION PLAN Q1 YR  08/30/2018     FOOT EXAM Q1 YEAR  12/08/2018     CREATININE Q1 YEAR  12/08/2018     FALL RISK ASSESSMENT  12/08/2018     LIPID MONITORING Q1 YEAR  12/08/2018     MICROALBUMIN Q1 YEAR  12/08/2018     TSH W/ FREE T4 REFLEX Q2 YEAR  12/08/2019     TETANUS IMMUNIZATION (SYSTEM ASSIGNED)  10/04/2021     ADVANCE DIRECTIVE PLANNING Q5 YRS  08/30/2022     COLON CANCER SCREEN (SYSTEM ASSIGNED)  05/10/2026     INFLUENZA VACCINE (SYSTEM ASSIGNED)  Completed     DEXA SCAN SCREENING (SYSTEM ASSIGNED)  Completed     PNEUMOCOCCAL  Completed     HEPATITIS C SCREENING  Completed       RAPHAEL Skaggs LPN          Labs reviewed in EPIC  BP Readings from Last 3 Encounters:   12/08/17 128/66   11/17/17 114/68   10/03/17 122/70    Wt Readings from Last 3 Encounters:   12/08/17 180 lb (81.6 kg)   11/17/17 180 lb (81.6 kg)   08/21/17 180 lb (81.6 kg)                  Patient Active Problem List   Diagnosis     Obesity     Fibromyalgia     Advanced directives, counseling/discussion     Allergic rhinitis     Low back pain     MIRZA (obstructive sleep apnea)- on CPAP     Status post total right knee replacement     Type 2 diabetes mellitus without complication (H)     Hyperlipidemia LDL goal <100     Mild single current episode of major depressive disorder (H)     Osteopenia     Mild intermittent asthma, unspecified whether complicated     Past Surgical History:   Procedure Laterality Date     APPENDECTOMY  1992     ARTHROPLASTY KNEE Right 6/7/2016    Procedure: ARTHROPLASTY KNEE;   Surgeon: Jose Alberto Gomez MD;  Location: [facility] OR     CARPAL TUNNEL RELEASE RT/LT  05/02, 08/02    right 5/2002, left 8/2002     COLONOSCOPY  4/2009    Normal; repeat in 10 years     COLONOSCOPY N/A 11/4/2015    Procedure: COLONOSCOPY;  Surgeon: Cathie Melendez MD;  Location: [facility]     EXCHANGE POLY COMPONENT ARTHROPLASTY KNEE Right 9/5/2017    Procedure: EXCHANGE POLY COMPONENT ARTHROPLASTY KNEE;  1.  Right knee exploration and limited scar tissue excision.   2.  Tibial polyethylene insert exchange (after removal of the original polyethylene component and following the posterior cruciate ligament resection, a deep dish polyethylene #3 of 9 mm thickness was placed)     ;  Surgeon: Jose Alberto Gomez MD;  Location: [facility] OR     HYSTERECTOMY TOTAL ABDOMINAL, BILATERAL SALPINGO-OOPHORECTOMY, COMBINED  11/18/1992    Fibroids, endometriosis, Dr Rosas     JOINT REPLACEMENT  4/2010    bilateral thumb     MANOMETRY (ESOPHAGEAL)  10/2007    Normal     PUNC/ASPIR BREAST CYST  11/2004     TONSILLECTOMY  1950       Social History   Substance Use Topics     Smoking status: Former Smoker     Packs/day: 0.00     Years: 6.00     Types: Cigarettes     Quit date: 6/9/1974     Smokeless tobacco: Never Used      Comment: Previous smoker, 1-1.5 packs per week     Alcohol use 2.4 - 3.6 oz/week     4 - 6 Standard drinks or equivalent per week      Comment: 1 sp monthly     Family History   Problem Relation Age of Onset     Hypertension Father      Blood Disease Father      DVT's     Eye Disorder Father      Colon Cancer Father      Colon Cancer Paternal Uncle      Asthma Sister      C.A.D. Paternal Grandfather      DIABETES Maternal Aunt      Colon Cancer Maternal Uncle      Hyperlipidemia Paternal Grandmother      Hypertension Paternal Grandmother      Hyperlipidemia Other      Aunt     Hypertension Maternal Grandfather      Hypertension Other      Aunt     Breast Cancer No family hx of      Anesthesia Reaction No family hx of       OSTEOPOROSIS No family hx of      Thyroid Disease No family hx of          Current Outpatient Prescriptions   Medication Sig Dispense Refill     metFORMIN (GLUCOPHAGE-XR) 500 MG 24 hr tablet Take 1 tablet (500 mg) by mouth daily (with dinner) 90 tablet 3     omeprazole (PRILOSEC) 20 MG CR capsule Take 1 capsule (20 mg) by mouth daily 90 capsule 3     simvastatin (ZOCOR) 20 MG tablet Take 1 tablet (20 mg) by mouth At Bedtime 90 tablet 1     montelukast (SINGULAIR) 10 MG tablet TAKE 1 TABLET(10 MG) BY MOUTH AT BEDTIME 90 tablet 3     Blood Glucose Monitoring Suppl (CONTOUR NEXT EZ MONITOR) W/DEVICE KIT 1 each daily as needed 1 kit 0     fluticasone-salmeterol (ADVAIR DISKUS) 500-50 MCG/DOSE diskus inhaler Inhale 1 puff into the lungs 2 times daily 3 Inhaler 3     clindamycin (CLEOCIN) 300 MG capsule Take 1 capsule (300 mg) by mouth 3 times daily 180 capsule 1     albuterol (PROAIR HFA/PROVENTIL HFA/VENTOLIN HFA) 108 (90 BASE) MCG/ACT Inhaler Inhale 2 puffs into the lungs every 4 hours as needed for shortness of breath / dyspnea or wheezing 1 Inhaler 5     blood glucose monitoring (SourceThought CONTOUR NEXT) test strip Use to test blood sugar 2 times daily or as directed. 100 each 3     blood glucose monitoring (EMILY MICROLET) lancets Use to test blood sugar 2 times daily or as directed.  Ok to substitute alternative if insurance prefers. 100 each 3     fexofenadine-pseudoePHEDrine (ALLEGRA-D)  MG per tablet Take 1 tablet by mouth 2 times daily as needed        Cholecalciferol (VITAMIN D3 PO) Take 1,000 Units by mouth daily        zolpidem (AMBIEN) 10 MG tablet 1 TABLET AT BEDTIME AS NEEDED 30 tablet 0     escitalopram (LEXAPRO) 20 MG tablet Take 1 tablet (20 mg) by mouth daily 30 tablet 1     Allergies   Allergen Reactions     Ivp Dye [Contrast Dye] Shortness Of Breath     Claritin [Loratadine]      Hallucinations       Recent Labs   Lab Test  12/08/17   1143  08/30/17   1228  06/07/17   1447  12/09/16   1149   "07/22/16   1050   A1C  6.2*  6.2*  6.4*  6.0   --    LDL  90   --    --   83  153*   HDL  61   --    --   48*  41*   TRIG  90   --    --   139  223*   ALT  24   --    --   27  19   CR  0.68  0.70  0.65  0.66  0.69   GFRESTIMATED  86  83  90  89  84   GFRESTBLACK  >90  >90  >90  African American GFR Calc    >90   GFR Calc    >90   GFR Calc     POTASSIUM  3.5  4.1  4.0  3.9  4.0   TSH  1.27   --   0.76  1.00  0.92                  ROS:  C: NEGATIVE for fever, chills, change in weight  I: NEGATIVE for worrisome rashes, moles or lesions  E: NEGATIVE for vision changes or irritation  E/M: NEGATIVE for ear, mouth and throat problems  R: NEGATIVE for significant cough or SOB  B: NEGATIVE for masses, tenderness or discharge  CV: NEGATIVE for chest pain, palpitations or peripheral edema  GI: NEGATIVE for nausea, abdominal pain, heartburn, or change in bowel habits  : NEGATIVE for frequency, dysuria, or hematuria  M: NEGATIVE for significant arthralgias or myalgia  N: NEGATIVE for weakness, dizziness or paresthesias  E: NEGATIVE for temperature intolerance, skin/hair changes  H: NEGATIVE for bleeding problems  P: NEGATIVE for changes in mood or affect    OBJECTIVE:   /66  Pulse 80  Temp 97.2  F (36.2  C)  Resp 16  Ht 5' 3.5\" (1.613 m)  Wt 180 lb (81.6 kg)  SpO2 98%  BMI 31.39 kg/m2 Estimated body mass index is 31.39 kg/(m^2) as calculated from the following:    Height as of this encounter: 5' 3.5\" (1.613 m).    Weight as of this encounter: 180 lb (81.6 kg).  EXAM:   GENERAL APPEARANCE: healthy, alert and no distress  EYES: Eyes grossly normal to inspection, PERRL and conjunctivae and sclerae normal  HENT: ear canals and TM's normal, nose and mouth without ulcers or lesions, oropharynx clear and oral mucous membranes moist  NECK: no adenopathy, no asymmetry, masses, or scars and thyroid normal to palpation  RESP: lungs clear to auscultation - no rales, rhonchi or wheezes  BREAST: " normal without masses, tenderness or nipple discharge and no palpable axillary masses or adenopathy  CV: regular rate and rhythm, normal S1 S2, no S3 or S4, no murmur, click or rub, no peripheral edema and peripheral pulses strong  ABDOMEN: soft, nontender, no hepatosplenomegaly, no masses and bowel sounds normal  MS: no musculoskeletal defects are noted and gait is age appropriate without ataxia  SKIN: no suspicious lesions or rashes  NEURO: Normal strength and tone, sensory exam grossly normal, mentation intact and speech normal  PSYCH: mentation appears normal and affect normal/bright  Results for orders placed or performed in visit on 12/08/17   TSH with free T4 reflex   Result Value Ref Range    TSH 1.27 0.40 - 4.00 mU/L   Lipid Profile   Result Value Ref Range    Cholesterol 169 <200 mg/dL    Triglycerides 90 <150 mg/dL    HDL Cholesterol 61 >49 mg/dL    LDL Cholesterol Calculated 90 <100 mg/dL    Non HDL Cholesterol 108 <130 mg/dL   Comprehensive metabolic panel   Result Value Ref Range    Sodium 143 133 - 144 mmol/L    Potassium 3.5 3.4 - 5.3 mmol/L    Chloride 107 94 - 109 mmol/L    Carbon Dioxide 27 20 - 32 mmol/L    Anion Gap 9 3 - 14 mmol/L    Glucose 127 (H) 70 - 99 mg/dL    Urea Nitrogen 26 7 - 30 mg/dL    Creatinine 0.68 0.52 - 1.04 mg/dL    GFR Estimate 86 >60 mL/min/1.7m2    GFR Estimate If Black >90 >60 mL/min/1.7m2    Calcium 9.1 8.5 - 10.1 mg/dL    Bilirubin Total 0.7 0.2 - 1.3 mg/dL    Albumin 3.8 3.4 - 5.0 g/dL    Protein Total 7.4 6.8 - 8.8 g/dL    Alkaline Phosphatase 101 40 - 150 U/L    ALT 24 0 - 50 U/L    AST 15 0 - 45 U/L   Hemoglobin   Result Value Ref Range    Hemoglobin 13.2 11.7 - 15.7 g/dL   Hemoglobin A1c   Result Value Ref Range    Hemoglobin A1C 6.2 (H) 4.3 - 6.0 %   Albumin Random Urine Quantitative with Creat Ratio   Result Value Ref Range    Creatinine Urine 150 mg/dL    Albumin Urine mg/L 12 mg/L    Albumin Urine mg/g Cr 8.33 0 - 25 mg/g Cr       ASSESSMENT / PLAN:   1.  Routine general medical examination at a health care facility  Labs reviewed.    -Liver and gallbladder tests are normal. (ALT,AST, Alk phos, bilirubin), kidney function is normal (Cr, GFR), Sodium is normal, Potassium is normal, Calcium is normal  -Cholesterol levels (LDL,HDL, Triglycerides) are normal.  ADVISE: rechecking in 1 year.  -TSH (thyroid stimulating hormone) level is normal which indicates normal thyroid function.  -Hemoglobin is normal.  There is no evidence of anemia.  - Diabetes is well controlled. Recheck A1c in 6 months recommended.   -Microalbumin (urine protein) test is normal.  ADVISE: recheck annually    - Hemoglobin    2. Hyperlipidemia LDL goal <100    - simvastatin (ZOCOR) 20 MG tablet; Take 1 tablet (20 mg) by mouth At Bedtime  Dispense: 90 tablet; Refill: 1  - Lipid Profile  - Comprehensive metabolic panel    3. Mild intermittent asthma, unspecified whether complicated    - montelukast (SINGULAIR) 10 MG tablet; TAKE 1 TABLET(10 MG) BY MOUTH AT BEDTIME  Dispense: 90 tablet; Refill: 3  - fluticasone-salmeterol (ADVAIR DISKUS) 500-50 MCG/DOSE diskus inhaler; Inhale 1 puff into the lungs 2 times daily  Dispense: 3 Inhaler; Refill: 3    4. Type 2 diabetes mellitus without complication, without long-term current use of insulin (H)    - metFORMIN (GLUCOPHAGE-XR) 500 MG 24 hr tablet; Take 1 tablet (500 mg) by mouth daily (with dinner)  Dispense: 90 tablet; Refill: 3  - Blood Glucose Monitoring Suppl (CONTOUR NEXT EZ MONITOR) W/DEVICE KIT; 1 each daily as needed  Dispense: 1 kit; Refill: 0  - TSH with free T4 reflex  - Hemoglobin A1c  - Albumin Random Urine Quantitative with Creat Ratio  - FOOT EXAM    5. Gastroesophageal reflux disease without esophagitis  Well controlled.   - omeprazole (PRILOSEC) 20 MG CR capsule; Take 1 capsule (20 mg) by mouth daily  Dispense: 90 capsule; Refill: 3    End of Life Planning:  Patient currently has an advanced directive: Yes.  Practitioner is supportive of  "decision.    COUNSELING:  Reviewed preventive health counseling, as reflected in patient instructions       Regular exercise       Healthy diet/nutrition        Estimated body mass index is 31.39 kg/(m^2) as calculated from the following:    Height as of this encounter: 5' 3.5\" (1.613 m).    Weight as of this encounter: 180 lb (81.6 kg).  Weight management plan: Discussed healthy diet and exercise guidelines and patient will follow up in 12 months in clinic to re-evaluate.   reports that she quit smoking about 43 years ago. Her smoking use included Cigarettes. She smoked 0.00 packs per day for 6.00 years. She has never used smokeless tobacco.        Appropriate preventive services were discussed with this patient, including applicable screening as appropriate for cardiovascular disease, diabetes, osteopenia/osteoporosis, and glaucoma.  As appropriate for age/gender, discussed screening for colorectal cancer, prostate cancer, breast cancer, and cervical cancer. Checklist reviewing preventive services available has been given to the patient.    Reviewed patients plan of care and provided an AVS. The Intermediate Care Plan ( asthma action plan, low back pain action plan, and migraine action plan) for Claribel meets the Care Plan requirement. This Care Plan has been established and reviewed with the Patient.    Counseling Resources:  ATP IV Guidelines  Pooled Cohorts Equation Calculator  Breast Cancer Risk Calculator  FRAX Risk Assessment  ICSI Preventive Guidelines  Dietary Guidelines for Americans, 2010  USDA's MyPlate  ASA Prophylaxis  Lung CA Screening    Chata Wallace MD  [facility] [place]  "

## 2017-12-09 LAB
ALBUMIN SERPL-MCNC: 3.8 G/DL (ref 3.4–5)
ALP SERPL-CCNC: 101 U/L (ref 40–150)
ALT SERPL W P-5'-P-CCNC: 24 U/L (ref 0–50)
ANION GAP SERPL CALCULATED.3IONS-SCNC: 9 MMOL/L (ref 3–14)
AST SERPL W P-5'-P-CCNC: 15 U/L (ref 0–45)
BILIRUB SERPL-MCNC: 0.7 MG/DL (ref 0.2–1.3)
BUN SERPL-MCNC: 26 MG/DL (ref 7–30)
CALCIUM SERPL-MCNC: 9.1 MG/DL (ref 8.5–10.1)
CHLORIDE SERPL-SCNC: 107 MMOL/L (ref 94–109)
CHOLEST SERPL-MCNC: 169 MG/DL
CO2 SERPL-SCNC: 27 MMOL/L (ref 20–32)
CREAT SERPL-MCNC: 0.68 MG/DL (ref 0.52–1.04)
CREAT UR-MCNC: 150 MG/DL
GFR SERPL CREATININE-BSD FRML MDRD: 86 ML/MIN/1.7M2
GLUCOSE SERPL-MCNC: 127 MG/DL (ref 70–99)
HDLC SERPL-MCNC: 61 MG/DL
LDLC SERPL CALC-MCNC: 90 MG/DL
MICROALBUMIN UR-MCNC: 12 MG/L
MICROALBUMIN/CREAT UR: 8.33 MG/G CR (ref 0–25)
NONHDLC SERPL-MCNC: 108 MG/DL
POTASSIUM SERPL-SCNC: 3.5 MMOL/L (ref 3.4–5.3)
PROT SERPL-MCNC: 7.4 G/DL (ref 6.8–8.8)
SODIUM SERPL-SCNC: 143 MMOL/L (ref 133–144)
TRIGL SERPL-MCNC: 90 MG/DL
TSH SERPL DL<=0.005 MIU/L-ACNC: 1.27 MU/L (ref 0.4–4)

## 2017-12-09 ASSESSMENT — ANXIETY QUESTIONNAIRES: GAD7 TOTAL SCORE: 5

## 2017-12-09 ASSESSMENT — ASTHMA QUESTIONNAIRES: ACT_TOTALSCORE: 22

## 2018-01-30 ENCOUNTER — OFFICE VISIT (OUTPATIENT)
Dept: FAMILY MEDICINE | Facility: CLINIC | Age: 73
End: 2018-01-30
Payer: COMMERCIAL

## 2018-01-30 VITALS
OXYGEN SATURATION: 97 % | HEIGHT: 64 IN | SYSTOLIC BLOOD PRESSURE: 130 MMHG | TEMPERATURE: 97 F | HEART RATE: 70 BPM | DIASTOLIC BLOOD PRESSURE: 80 MMHG

## 2018-01-30 DIAGNOSIS — H90.0 CONDUCTIVE HEARING LOSS, BILATERAL: ICD-10-CM

## 2018-01-30 DIAGNOSIS — H61.23 BILATERAL IMPACTED CERUMEN: ICD-10-CM

## 2018-01-30 DIAGNOSIS — J06.9 VIRAL URI WITH COUGH: Primary | ICD-10-CM

## 2018-01-30 PROCEDURE — 69209 REMOVE IMPACTED EAR WAX UNI: CPT | Mod: 50 | Performed by: FAMILY MEDICINE

## 2018-01-30 PROCEDURE — 99213 OFFICE O/P EST LOW 20 MIN: CPT | Mod: 25 | Performed by: FAMILY MEDICINE

## 2018-01-30 ASSESSMENT — ANXIETY QUESTIONNAIRES
IF YOU CHECKED OFF ANY PROBLEMS ON THIS QUESTIONNAIRE, HOW DIFFICULT HAVE THESE PROBLEMS MADE IT FOR YOU TO DO YOUR WORK, TAKE CARE OF THINGS AT HOME, OR GET ALONG WITH OTHER PEOPLE: NOT DIFFICULT AT ALL
2. NOT BEING ABLE TO STOP OR CONTROL WORRYING: NOT AT ALL
1. FEELING NERVOUS, ANXIOUS, OR ON EDGE: SEVERAL DAYS
5. BEING SO RESTLESS THAT IT IS HARD TO SIT STILL: NOT AT ALL
7. FEELING AFRAID AS IF SOMETHING AWFUL MIGHT HAPPEN: NOT AT ALL
6. BECOMING EASILY ANNOYED OR IRRITABLE: NOT AT ALL
GAD7 TOTAL SCORE: 2
3. WORRYING TOO MUCH ABOUT DIFFERENT THINGS: NOT AT ALL

## 2018-01-30 ASSESSMENT — PATIENT HEALTH QUESTIONNAIRE - PHQ9: 5. POOR APPETITE OR OVEREATING: SEVERAL DAYS

## 2018-01-30 NOTE — MR AVS SNAPSHOT
"              After Visit Summary   2018    Claribel Del Rio    MRN: [id number]           Patient Information     Date Of Birth          1945        Visit Information        Provider Department      2018 11:40 AM Chata Wallace MD [facility] [place]        Today's Diagnoses     Viral URI with cough    -  1    Conductive hearing loss, bilateral        Bilateral impacted cerumen           Follow-ups after your visit        Who to contact     If you have questions or need follow up information about today's clinic visit or your schedule please contact [facility] [place] directly at [phone].  Normal or non-critical lab and imaging results will be communicated to you by duuinhart, letter or phone within 4 business days after the clinic has received the results. If you do not hear from us within 7 days, please contact the clinic through duuinhart or phone. If you have a critical or abnormal lab result, we will notify you by phone as soon as possible.  Submit refill requests through Mav[employer] or call your pharmacy and they will forward the refill request to us. Please allow 3 business days for your refill to be completed.          Additional Information About Your Visit        MyChart Information     [employer] lets you send messages to your doctor, view your test results, renew your prescriptions, schedule appointments and more. To sign up, go to [email or web address] . Click on \"Log in\" on the left side of the screen, which will take you to the Welcome page. Then click on \"Sign up Now\" on the right side of the page.     You will be asked to enter the access code listed below, as well as some personal information. Please follow the directions to create your username and password.     Your access code is: [email or web address]  Expires: 3/8/2018 11:33 AM     Your access code will  in 90 days. If you need help or a new code, please call your [facility] or [phone].        Care " "EveryWhere ID     This is your Care EveryWhere ID. This could be used by other organizations to access your [place] medical records  [id number]        Your Vitals Were     Pulse Temperature Height Pulse Oximetry          70 97  F (36.1  C) (Tympanic) 5' 3.5\" (1.613 m) 97%         Blood Pressure from Last 3 Encounters:   01/30/18 130/80   12/08/17 128/66   11/17/17 114/68    Weight from Last 3 Encounters:   12/08/17 180 lb (81.6 kg)   11/17/17 180 lb (81.6 kg)   08/21/17 180 lb (81.6 kg)              We Performed the Following     [facility]        Primary Care Provider Office Phone # Fax #    Chata Wallace [phone]       [address]  [place] [address]        Equal Access to Services     [facility]: Hadii aad ku hadasho Soomaali, waaxda luqadaha, qaybta kaalmada adeegyada, waxay dorisin hayaan german becerra . So [facility] [phone].    ATENCIÓN: Si habla español, tiene a schaeffer disposición servicios gratuitos de asistencia lingüística. Llame al [phone].    We comply with applicable federal civil rights laws and [place] laws. We do not discriminate on the basis of race, color, national origin, age, disability, sex, sexual orientation, or gender identity.            Thank you!     Thank you for choosing [facility] [place]  for your care. Our goal is always to provide you with excellent care. Hearing back from our patients is one way we can continue to improve our services. Please take a few minutes to complete the written survey that you may receive in the mail after your visit with us. Thank you!             Your Updated Medication List - Protect others around you: Learn how to safely use, store and throw away your medicines at www.disposemymeds.org.          This list is accurate as of 1/30/18 12:46 PM.  Always use your most recent med list.                   Brand Name Dispense Instructions for use Diagnosis    albuterol 108 (90 BASE) MCG/ACT " Inhaler    PROAIR HFA/PROVENTIL HFA/VENTOLIN HFA    1 Inhaler    Inhale 2 puffs into the lungs every 4 hours as needed for shortness of breath / dyspnea or wheezing    Moderate persistent asthma without complication       blood glucose monitoring lancets     100 each    Use to test blood sugar 2 times daily or as directed.  Ok to substitute alternative if insurance prefers.    Type 2 diabetes mellitus without complication, without long-term current use of insulin (H)       blood glucose monitoring test strip    EMILY CONTOUR NEXT    100 each    Use to test blood sugar 2 times daily or as directed.    Type 2 diabetes mellitus without complication, without long-term current use of insulin (H)       CONTOUR NEXT EZ MONITOR W/DEVICE Kit     1 kit    1 each daily as needed    Type 2 diabetes mellitus without complication, without long-term current use of insulin (H)       escitalopram 20 MG tablet    LEXAPRO    30 tablet    Take 1 tablet (20 mg) by mouth daily    Major depression       fexofenadine-pseudoePHEDrine  MG per 12 hr tablet    ALLEGRA-D     Take 1 tablet by mouth 2 times daily as needed        fluticasone-salmeterol 500-50 MCG/DOSE diskus inhaler    ADVAIR DISKUS    3 Inhaler    Inhale 1 puff into the lungs 2 times daily    Mild intermittent asthma, unspecified whether complicated       metFORMIN 500 MG 24 hr tablet    GLUCOPHAGE-XR    90 tablet    Take 1 tablet (500 mg) by mouth daily (with dinner)    Type 2 diabetes mellitus without complication, without long-term current use of insulin (H)       montelukast 10 MG tablet    SINGULAIR    90 tablet    TAKE 1 TABLET(10 MG) BY MOUTH AT BEDTIME    Mild intermittent asthma, unspecified whether complicated       omeprazole 20 MG CR capsule    priLOSEC    90 capsule    Take 1 capsule (20 mg) by mouth daily    Gastroesophageal reflux disease without esophagitis       simvastatin 20 MG tablet    ZOCOR    90 tablet    Take 1 tablet (20 mg) by mouth At Bedtime     Hyperlipidemia LDL goal <100       VITAMIN D3 PO      Take 1,000 Units by mouth daily        zolpidem 10 MG tablet    AMBIEN    30 tablet    1 TABLET AT BEDTIME AS NEEDED    Insomnia

## 2018-01-30 NOTE — NURSING NOTE
"Chief Complaint   Patient presents with     Ear Problem     with cold sx's       Initial /80  Pulse 70  Temp 97  F (36.1  C) (Tympanic)  Ht 5' 3.5\" (1.613 m)  SpO2 97% Estimated body mass index is 31.39 kg/(m^2) as calculated from the following:    Height as of 12/8/17: 5' 3.5\" (1.613 m).    Weight as of 12/8/17: 180 lb (81.6 kg).  Medication Reconciliation: complete  "

## 2018-01-30 NOTE — PROGRESS NOTES
SUBJECTIVE:   Claribel Del Rio is a 72 year old female who presents to clinic today for the following health issues:      Acute Illness   Acute illness concerns: Ear issues & cold sx's  Onset: 3 weeks    Fever: no     Chills/Sweats: no     Headache (location?): YES    Sinus Pressure:no    Conjunctivitis:  YES: both    Ear Pain: YES: right.  Both ears feel plugged.  Unable to hear out of her ears.  She has used over-the-counter Debrox without much benefit.    Rhinorrhea: YES    Congestion: no     Sore Throat: no      Cough: YES-productive of yellow sputum, with shortness of breath    Wheeze: no     Decreased Appetite: no     Nausea: no     Vomiting: no     Diarrhea:  YES    Dysuria/Freq.: no     Fatigue/Achiness: YES    Sick/Strep Exposure: no      Therapies Tried and outcome: allegra d          Problem list and histories reviewed & adjusted, as indicated.  Additional history: as documented    Patient Active Problem List   Diagnosis     Obesity     Fibromyalgia     Advanced directives, counseling/discussion     Allergic rhinitis     Low back pain     MIRZA (obstructive sleep apnea)- on CPAP     Status post total right knee replacement     Type 2 diabetes mellitus without complication (H)     Hyperlipidemia LDL goal <100     Mild single current episode of major depressive disorder (H)     Osteopenia     Mild intermittent asthma, unspecified whether complicated     Past Surgical History:   Procedure Laterality Date     APPENDECTOMY  1992     ARTHROPLASTY KNEE Right 6/7/2016    Procedure: ARTHROPLASTY KNEE;  Surgeon: Jose Alberto Gomez MD;  Location: [facility] OR     CARPAL TUNNEL RELEASE RT/LT  05/02, 08/02    right 5/2002, left 8/2002     COLONOSCOPY  4/2009    Normal; repeat in 10 years     COLONOSCOPY N/A 11/4/2015    Procedure: COLONOSCOPY;  Surgeon: Cathie Melendez MD;  Location:  GI     EXCHANGE POLY COMPONENT ARTHROPLASTY KNEE Right 9/5/2017    Procedure: EXCHANGE POLY COMPONENT ARTHROPLASTY KNEE;  1.  Right  knee exploration and limited scar tissue excision.   2.  Tibial polyethylene insert exchange (after removal of the original polyethylene component and following the posterior cruciate ligament resection, a deep dish polyethylene #3 of 9 mm thickness was placed)     ;  Surgeon: Jose Alberto Gomez MD;  Location: [facility] OR     HYSTERECTOMY TOTAL ABDOMINAL, BILATERAL SALPINGO-OOPHORECTOMY, COMBINED  11/18/1992    Fibroids, endometriosis, Dr Rosas     JOINT REPLACEMENT  4/2010    bilateral thumb     MANOMETRY (ESOPHAGEAL)  10/2007    Normal     PUNC/ASPIR BREAST CYST  11/2004     TONSILLECTOMY  1950       Social History   Substance Use Topics     Smoking status: Former Smoker     Packs/day: 0.00     Years: 6.00     Types: Cigarettes     Quit date: 6/9/1974     Smokeless tobacco: Never Used      Comment: Previous smoker, 1-1.5 packs per week     Alcohol use 2.4 - 3.6 oz/week     4 - 6 Standard drinks or equivalent per week      Comment: 1 sp monthly     Family History   Problem Relation Age of Onset     Hypertension Father      Blood Disease Father      DVT's     Eye Disorder Father      Colon Cancer Father      Colon Cancer Paternal Uncle      Asthma Sister      C.A.D. Paternal Grandfather      DIABETES Maternal Aunt      Colon Cancer Maternal Uncle      Hyperlipidemia Paternal Grandmother      Hypertension Paternal Grandmother      Hyperlipidemia Other      Aunt     Hypertension Maternal Grandfather      Hypertension Other      Aunt     Breast Cancer No family hx of      Anesthesia Reaction No family hx of      OSTEOPOROSIS No family hx of      Thyroid Disease No family hx of          Current Outpatient Prescriptions   Medication Sig Dispense Refill     metFORMIN (GLUCOPHAGE-XR) 500 MG 24 hr tablet Take 1 tablet (500 mg) by mouth daily (with dinner) 90 tablet 3     omeprazole (PRILOSEC) 20 MG CR capsule Take 1 capsule (20 mg) by mouth daily 90 capsule 3     simvastatin (ZOCOR) 20 MG tablet Take 1 tablet (20 mg) by mouth At  "Bedtime 90 tablet 1     montelukast (SINGULAIR) 10 MG tablet TAKE 1 TABLET(10 MG) BY MOUTH AT BEDTIME 90 tablet 3     Blood Glucose Monitoring Suppl (CONTOUR NEXT EZ MONITOR) W/DEVICE KIT 1 each daily as needed 1 kit 0     fluticasone-salmeterol (ADVAIR DISKUS) 500-50 MCG/DOSE diskus inhaler Inhale 1 puff into the lungs 2 times daily 3 Inhaler 3     albuterol (PROAIR HFA/PROVENTIL HFA/VENTOLIN HFA) 108 (90 BASE) MCG/ACT Inhaler Inhale 2 puffs into the lungs every 4 hours as needed for shortness of breath / dyspnea or wheezing 1 Inhaler 5     blood glucose monitoring (gogamingo CONTOUR NEXT) test strip Use to test blood sugar 2 times daily or as directed. 100 each 3     blood glucose monitoring (EMILY MICROLET) lancets Use to test blood sugar 2 times daily or as directed.  Ok to substitute alternative if insurance prefers. 100 each 3     fexofenadine-pseudoePHEDrine (ALLEGRA-D)  MG per tablet Take 1 tablet by mouth 2 times daily as needed        Cholecalciferol (VITAMIN D3 PO) Take 1,000 Units by mouth daily        zolpidem (AMBIEN) 10 MG tablet 1 TABLET AT BEDTIME AS NEEDED 30 tablet 0     escitalopram (LEXAPRO) 20 MG tablet Take 1 tablet (20 mg) by mouth daily 30 tablet 1     Allergies   Allergen Reactions     Ivp Dye [Contrast Dye] Shortness Of Breath     Claritin [Loratadine]      Hallucinations         Reviewed and updated as needed this visit by clinical staff       Reviewed and updated as needed this visit by Provider         ROS:  C: NEGATIVE for fever, chills, change in weight  GI: NEGATIVE for nausea, abdominal pain, heartburn, or change in bowel habits    OBJECTIVE:                                                    /80  Pulse 70  Temp 97  F (36.1  C) (Tympanic)  Ht 5' 3.5\" (1.613 m)  SpO2 97%  There is no height or weight on file to calculate BMI.   GENERAL: healthy, alert, well nourished, well hydrated, no distress  HENT: ear canals- b/l ear canals are impacted completely.  Nose- normal; " Mouth- no ulcers, no lesions  NECK: no tenderness, no adenopathy, no asymmetry, no masses, no stiffness; thyroid- normal to palpation  RESP: lungs clear to auscultation - no rales, no rhonchi, no wheezes  CV: regular rates and rhythm, normal S1 S2, no S3 or S4 and no murmur, no click or rub -  ABDOMEN: soft, no tenderness, no  hepatosplenomegaly, no masses, normal bowel sounds    Cerumen Removal:    Bilateral ear(s) are occluded with cerumen.  The canal are irrigated with good results, and thereafter are clear, without erythema and edema of the ear canals bilaterally..  The tympanic membrane(s) bilaterally noted to have serous fluid.         ASSESSMENT/PLAN:                                                      1. Viral URI with cough  No signs of bacterial infection.  Patient reassured.  Instructed to stay well-hydrated.  If any symptomatic worsening noted, instructed to notify me back.    2. Conductive hearing loss, bilateral  Due to cerumen impaction.  Significant improvement noted by the patient after removal of cerumen.  Patient is noted to have serous otitis media bilaterally which can also contribute to some of the hearing loss.  I would expect that to resolve gradually on its own without any medications    3. Bilateral impacted cerumen    - REMOVE IMPACTED CERUMEN         Chata Wallace MD  [facility] [place]

## 2018-01-31 ASSESSMENT — ANXIETY QUESTIONNAIRES: GAD7 TOTAL SCORE: 2

## 2018-01-31 ASSESSMENT — PATIENT HEALTH QUESTIONNAIRE - PHQ9: SUM OF ALL RESPONSES TO PHQ QUESTIONS 1-9: 3

## 2018-03-02 DIAGNOSIS — E11.9 TYPE 2 DIABETES MELLITUS WITHOUT COMPLICATION (H): ICD-10-CM

## 2018-03-02 NOTE — TELEPHONE ENCOUNTER
"Requested Prescriptions   Pending Prescriptions Disp Refills     metFORMIN (GLUCOPHAGE-XR) 500 MG 24 hr tablet [Pharmacy Med Name: METFORMIN ER 500MG 24HR TABS] 90 tablet 0    Last Written Prescription Date:  12/08/2017  Last Fill Quantity: 90 tablet,  # refills: 3   Last office visit: 1/30/2018 with prescribing provider:  Chata Wallace   Future Office Visit:     Sig: TAKE 1 TABLET(500 MG) BY MOUTH DAILY WITH DINNER    Biguanide Agents Passed    3/2/2018  3:22 AM       Passed - Blood pressure less than 140/90 in past 6 months    BP Readings from Last 3 Encounters:   01/30/18 130/80   12/08/17 128/66   11/17/17 114/68                Passed - Patient has documented LDL within the past 12 mos.    Recent Labs   Lab Test  12/08/17   1143   LDL  90            Passed - Patient has had a Microalbumin in the past 12 mos.    Recent Labs   Lab Test  12/08/17   1147   MICROL  12   UMALCR  8.33            Passed - Patient is age 10 or older       Passed - Patient has documented A1c within the specified period of time.    Recent Labs   Lab Test  12/08/17   1143   A1C  6.2*            Passed - Patient's CR is NOT>1.4 OR Patient's EGFR is NOT<45 within past 12 mos.    Recent Labs   Lab Test  12/08/17   1143   GFRESTIMATED  86   GFRESTBLACK  >90       Recent Labs   Lab Test  12/08/17   1143   CR  0.68            Passed - Patient does NOT have a diagnosis of CHF.       Passed - Patient is not pregnant       Passed - Patient has not had a positive pregnancy test within the past 12 mos.        Passed - Recent (6 mo) or future (30 days) visit within the authorizing provider's specialty    Patient had office visit in the last 6 months or has a visit in the next 30 days with authorizing provider.  See \"Patient Info\" tab in inbasket, or \"Choose Columns\" in Meds & Orders section of the refill encounter.              "

## 2018-03-05 RX ORDER — METFORMIN HCL 500 MG
TABLET, EXTENDED RELEASE 24 HR ORAL
Qty: 90 TABLET | Refills: 0 | OUTPATIENT
Start: 2018-03-05

## 2018-03-28 ENCOUNTER — TELEPHONE (OUTPATIENT)
Dept: ORTHOPEDICS | Facility: CLINIC | Age: 73
End: 2018-03-28

## 2018-03-28 DIAGNOSIS — T84.53XD INFECTION OF TOTAL RIGHT KNEE REPLACEMENT, SUBSEQUENT ENCOUNTER: Primary | ICD-10-CM

## 2018-03-28 NOTE — TELEPHONE ENCOUNTER
Spoke with patient, informed her orders were placed, she can have blood work done at her  Primary Care clinic if she wants to versus coming to the hospital.  Pt verbalized understanding.

## 2018-03-28 NOTE — TELEPHONE ENCOUNTER
Reason for Call: Stating that Dr. Gomez wanted to repeat lab work 3 months after her last set of blood work (12/8/2017). She is wanting to get this taken care of. Please call her when labs are entered.     Patient expecting call back: Yes      Preferred contact number: [place] [phone] (home)      America Boyle M.Ed., ATR, ATC

## 2018-04-29 DIAGNOSIS — J45.20 MILD INTERMITTENT ASTHMA, UNSPECIFIED WHETHER COMPLICATED: ICD-10-CM

## 2018-05-01 NOTE — TELEPHONE ENCOUNTER
"Requested Prescriptions   Pending Prescriptions Disp Refills     ADVAIR DISKUS 500-50 MCG/DOSE diskus inhaler [Pharmacy Med Name: ADVAIR 500-50 DISKUS]  Last Written Prescription Date:  12/8/17  Last Fill Quantity: 3,  # refills: 3   Last office visit: 1/30/2018 with prescribing provider:  Rodrigo   Future Office Visit:      0     Sig: INHALE 1 PUFF INTO THE LUNGS TWICE DAILY    Inhaled Steroids Protocol Passed    4/29/2018 11:17 AM       Passed - Patient is age 12 or older       Passed - Asthma control assessment score within normal limits in last 6 months    Please review ACT score.   ACT Total Scores 8/30/2017 10/3/2017 12/8/2017   ACT TOTAL SCORE - - -   ASTHMA ER VISITS - - -   ASTHMA HOSPITALIZATIONS - - -   ACT TOTAL SCORE (Goal Greater than or Equal to 20) 14 21 22   In the past 12 months, how many times did you visit the emergency room for your asthma without being admitted to the hospital? 0 0 0   In the past 12 months, how many times were you hospitalized overnight because of your asthma? 0 0 0              Passed - Recent (6 mo) or future (30 days) visit within the authorizing provider's specialty    Patient had office visit in the last 6 months or has a visit in the next 30 days with authorizing provider or within the authorizing provider's specialty.  See \"Patient Info\" tab in inbasket, or \"Choose Columns\" in Meds & Orders section of the refill encounter.              "

## 2018-07-17 DIAGNOSIS — E78.5 HYPERLIPIDEMIA LDL GOAL <100: ICD-10-CM

## 2018-07-17 RX ORDER — SIMVASTATIN 20 MG
TABLET ORAL
Qty: 90 TABLET | Refills: 1 | Status: SHIPPED | OUTPATIENT
Start: 2018-07-17 | End: 2018-12-10

## 2018-07-17 NOTE — TELEPHONE ENCOUNTER
Prescription approved per [facility], [facility] or [facility] refill protocol.  Rosaline Paniagua RN - Triage  [facility]

## 2018-07-17 NOTE — TELEPHONE ENCOUNTER
"Requested Prescriptions   Pending Prescriptions Disp Refills     simvastatin (ZOCOR) 20 MG tablet [Pharmacy Med Name: SIMVASTATIN 20 MG TABLET]  Last Written Prescription Date:  12/8/17  Last Fill Quantity: 90,  # refills: 1   Last office visit: 1/30/2018 with prescribing provider:  Rodrigo   Future Office Visit:     90 tablet 1     Sig: TAKE 1 TABLET BY MOUTH AT BEDTIME    Statins Protocol Passed    7/17/2018  1:25 AM       Passed - LDL on file in past 12 months    Recent Labs   Lab Test  12/08/17   1143   LDL  90            Passed - No abnormal creatine kinase in past 12 months    Recent Labs   Lab Test  10/04/11   1155   CKT  74               Passed - Recent (12 mo) or future (30 days) visit within the authorizing provider's specialty    Patient had office visit in the last 12 months or has a visit in the next 30 days with authorizing provider or within the authorizing provider's specialty.  See \"Patient Info\" tab in inbasket, or \"Choose Columns\" in Meds & Orders section of the refill encounter.           Passed - Patient is age 18 or older       Passed - No active pregnancy on record       Passed - No positive pregnancy test in past 12 months          "

## 2018-11-06 ENCOUNTER — OFFICE VISIT (OUTPATIENT)
Dept: FAMILY MEDICINE | Facility: CLINIC | Age: 73
End: 2018-11-06
Payer: COMMERCIAL

## 2018-11-06 VITALS
BODY MASS INDEX: 31.89 KG/M2 | DIASTOLIC BLOOD PRESSURE: 70 MMHG | HEIGHT: 63 IN | TEMPERATURE: 98 F | SYSTOLIC BLOOD PRESSURE: 114 MMHG | HEART RATE: 71 BPM | OXYGEN SATURATION: 96 %

## 2018-11-06 DIAGNOSIS — Z23 NEED FOR PROPHYLACTIC VACCINATION AND INOCULATION AGAINST INFLUENZA: ICD-10-CM

## 2018-11-06 DIAGNOSIS — R11.11 INTRACTABLE VOMITING WITHOUT NAUSEA, UNSPECIFIED VOMITING TYPE: ICD-10-CM

## 2018-11-06 DIAGNOSIS — M54.50 CHRONIC RIGHT-SIDED LOW BACK PAIN WITHOUT SCIATICA: ICD-10-CM

## 2018-11-06 DIAGNOSIS — G89.29 CHRONIC RIGHT-SIDED LOW BACK PAIN WITHOUT SCIATICA: ICD-10-CM

## 2018-11-06 DIAGNOSIS — J45.20 MILD INTERMITTENT ASTHMA, UNSPECIFIED WHETHER COMPLICATED: Primary | ICD-10-CM

## 2018-11-06 PROCEDURE — 99214 OFFICE O/P EST MOD 30 MIN: CPT | Mod: 25 | Performed by: FAMILY MEDICINE

## 2018-11-06 PROCEDURE — G0008 ADMIN INFLUENZA VIRUS VAC: HCPCS | Performed by: FAMILY MEDICINE

## 2018-11-06 PROCEDURE — 90662 IIV NO PRSV INCREASED AG IM: CPT | Performed by: FAMILY MEDICINE

## 2018-11-06 ASSESSMENT — ANXIETY QUESTIONNAIRES
1. FEELING NERVOUS, ANXIOUS, OR ON EDGE: SEVERAL DAYS
6. BECOMING EASILY ANNOYED OR IRRITABLE: SEVERAL DAYS
2. NOT BEING ABLE TO STOP OR CONTROL WORRYING: SEVERAL DAYS
5. BEING SO RESTLESS THAT IT IS HARD TO SIT STILL: NOT AT ALL
7. FEELING AFRAID AS IF SOMETHING AWFUL MIGHT HAPPEN: NOT AT ALL
GAD7 TOTAL SCORE: 4
IF YOU CHECKED OFF ANY PROBLEMS ON THIS QUESTIONNAIRE, HOW DIFFICULT HAVE THESE PROBLEMS MADE IT FOR YOU TO DO YOUR WORK, TAKE CARE OF THINGS AT HOME, OR GET ALONG WITH OTHER PEOPLE: NOT DIFFICULT AT ALL
3. WORRYING TOO MUCH ABOUT DIFFERENT THINGS: NOT AT ALL

## 2018-11-06 ASSESSMENT — PATIENT HEALTH QUESTIONNAIRE - PHQ9
5. POOR APPETITE OR OVEREATING: SEVERAL DAYS
SUM OF ALL RESPONSES TO PHQ QUESTIONS 1-9: 13

## 2018-11-06 NOTE — PROGRESS NOTES

## 2018-11-06 NOTE — MR AVS SNAPSHOT
After Visit Summary   11/6/2018    Claribel Del Rio    MRN: [id number]           Patient Information     Date Of Birth          1945        Visit Information        Provider Department      11/6/2018 11:20 AM Chata Wallace MD [facility]        Today's Diagnoses     Mild intermittent asthma, unspecified whether complicated    -  1    Chronic right-sided low back pain without sciatica        Intractable vomiting without nausea, unspecified vomiting type           Follow-ups after your visit        Additional Services     GASTROENTEROLOGY ADULT REF CONSULT ONLY       Preferred Location: [facility] [phone]      Please be aware that coverage of these services is subject to the terms and limitations of your health insurance plan.  Call member services at your health plan with any benefit or coverage questions.  Any procedures must be performed at a [place] facility OR coordinated by your clinic's referral office.    Please bring the following with you to your appointment:    (1) Any X-Rays, CTs or MRIs which have been performed.  Contact the facility where they were done to arrange for  prior to your scheduled appointment.    (2) List of current medications   (3) This referral request   (4) Any documents/labs given to you for this referral            PHYSICAL THERAPY REFERRAL       Please be aware that coverage of these services is subject to the terms and limitations of your health insurance plan.  Call member services at your health plan with any benefit or coverage questions.                  Follow-up notes from your care team     Return in about 5 weeks (around 12/10/2018) for Annual Physical Exam.      Your next 10 appointments already scheduled     Dec 10, 2018 11:00 AM CST   PHYSICAL with Chata Wallace MD   [facility] ([facility])    [address] [place] [id number] [phone]              Future tests  "that were ordered for you today     Open Future Orders        Priority Expected Expires Ordered    PHYSICAL THERAPY REFERRAL Routine  11/6/2019 11/6/2018            Who to contact     If you have questions or need follow up information about today's clinic visit or your schedule please contact [facility] [place] directly at [phone].  Normal or non-critical lab and imaging results will be communicated to you by MyChart, letter or phone within 4 business days after the clinic has received the results. If you do not hear from us within 7 days, please contact the clinic through MyChart or phone. If you have a critical or abnormal lab result, we will notify you by phone as soon as possible.  Submit refill requests through [employer] or call your pharmacy and they will forward the refill request to us. Please allow 3 business days for your refill to be completed.          Additional Information About Your Visit        Care EveryWhere ID     This is your Care EveryWhere ID. This could be used by other organizations to access your [place] medical records  [id number]        Your Vitals Were     Pulse Temperature Height Pulse Oximetry BMI (Body Mass Index)       71 98  F (36.7  C) (Tympanic) 5' 3\" (1.6 m) 96% 31.89 kg/m2        Blood Pressure from Last 3 Encounters:   11/06/18 114/70   01/30/18 130/80   12/08/17 128/66    Weight from Last 3 Encounters:   12/08/17 180 lb (81.6 kg)   11/17/17 180 lb (81.6 kg)   08/21/17 180 lb (81.6 kg)              We Performed the Following     GASTROENTEROLOGY ADULT REF CONSULT ONLY          Today's Medication Changes          These changes are accurate as of 11/6/18 12:08 PM.  If you have any questions, ask your nurse or doctor.               These medicines have changed or have updated prescriptions.        Dose/Directions    fluticasone-salmeterol 500-50 MCG/DOSE diskus inhaler   Commonly known as:  ADVAIR DISKUS   This may have changed:  See the new instructions.   Used " for:  Mild intermittent asthma, unspecified whether complicated   Changed by:  Chata Wallace MD        Dose:  1 puff   Inhale 1 puff into the lungs 2 times daily   Quantity:  2 Inhaler   Refills:  0            Where to get your medicines      Some of these will need a paper prescription and others can be bought over the counter.  Ask your nurse if you have questions.     Bring a paper prescription for each of these medications     fluticasone-salmeterol 500-50 MCG/DOSE diskus inhaler                Primary Care Provider Office Phone # Fax #    Chata Wallace [phone] [address]  [place] [address]        Equal Access to Services     [facility]: Hadii anderson love hadiliana [employer], waaxda lujingadaha, qaybta kaalmada albina, thalia becerra . So [facility] [phone].    ATENCIÓN: Si habla español, tiene a schaeffer disposición servicios gratuitos de asistencia lingüística. Llame al [phone].    We comply with applicable federal civil rights laws and [place] laws. We do not discriminate on the basis of race, color, national origin, age, disability, sex, sexual orientation, or gender identity.            Thank you!     [facility]. Our goal is always to provide you with excellent care. Hearing back from our patients is one way we can continue to improve our services. Please take a few minutes to complete the written survey that you may receive in the mail after your visit with us. Thank you!             Your Updated Medication List - Protect others around you: Learn how to safely use, store and throw away your medicines at www.disposemymeds.org.          This list is accurate as of 11/6/18 12:08 PM.  Always use your most recent med list.                   Brand Name Dispense Instructions for use Diagnosis    albuterol 108 (90 Base) MCG/ACT inhaler    PROAIR HFA/PROVENTIL HFA/VENTOLIN HFA    1 Inhaler     Inhale 2 puffs into the lungs every 4 hours as needed for shortness of breath / dyspnea or wheezing    Moderate persistent asthma without complication       blood glucose monitoring lancets     100 each    Use to test blood sugar 2 times daily or as directed.  Ok to substitute alternative if insurance prefers.    Type 2 diabetes mellitus without complication, without long-term current use of insulin (H)       blood glucose monitoring test strip    EMILY CONTOUR NEXT    100 each    Use to test blood sugar 2 times daily or as directed.    Type 2 diabetes mellitus without complication, without long-term current use of insulin (H)       CONTOUR NEXT EZ MONITOR w/Device Kit     1 kit    1 each daily as needed    Type 2 diabetes mellitus without complication, without long-term current use of insulin (H)       escitalopram 20 MG tablet    LEXAPRO    30 tablet    Take 1 tablet (20 mg) by mouth daily    Major depression       fexofenadine-pseudoePHEDrine  MG per 12 hr tablet    ALLEGRA-D     Take 1 tablet by mouth 2 times daily as needed        fluticasone-salmeterol 500-50 MCG/DOSE diskus inhaler    ADVAIR DISKUS    2 Inhaler    Inhale 1 puff into the lungs 2 times daily    Mild intermittent asthma, unspecified whether complicated       metFORMIN 500 MG 24 hr tablet    GLUCOPHAGE-XR    90 tablet    Take 1 tablet (500 mg) by mouth daily (with dinner)    Type 2 diabetes mellitus without complication, without long-term current use of insulin (H)       montelukast 10 MG tablet    SINGULAIR    90 tablet    TAKE 1 TABLET(10 MG) BY MOUTH AT BEDTIME    Mild intermittent asthma, unspecified whether complicated       omeprazole 20 MG CR capsule    priLOSEC    90 capsule    Take 1 capsule (20 mg) by mouth daily    Gastroesophageal reflux disease without esophagitis       simvastatin 20 MG tablet    ZOCOR    90 tablet    TAKE 1 TABLET BY MOUTH AT BEDTIME    Hyperlipidemia LDL goal <100       VITAMIN D3 PO      Take 1,000 Units by  mouth daily        zolpidem 10 MG tablet    AMBIEN    30 tablet    1 TABLET AT BEDTIME AS NEEDED    Insomnia

## 2018-11-06 NOTE — PROGRESS NOTES
SUBJECTIVE:   Claribel Del Rio is a 73 year old female who presents to clinic today for the following health issues:      Asthma Follow-Up    Was ACT completed today?    Yes    ACT Total Scores 11/6/2018   ACT TOTAL SCORE -   ASTHMA ER VISITS -   ASTHMA HOSPITALIZATIONS -   ACT TOTAL SCORE (Goal Greater than or Equal to 20) 16   In the past 12 months, how many times did you visit the emergency room for your asthma without being admitted to the hospital? 0   In the past 12 months, how many times were you hospitalized overnight because of your asthma? 0       Recent asthma triggers that patient is dealing with: Patient is unaware of triggers and seems to always be there  Patient ran out of her Advair.  It has been a few weeks now.  She tells she cannot afford to get the prescription as she is currently in her donut hole for coverage.  Need to print out of her prescription she if she can get it from [employer].  She has done that in the past for several years and it works well.  She gets it at about a cheaper price.      Amount of exercise or physical activity: None    Problems taking medications regularly: No    Medication side effects: none    Diet: regular (no restrictions)      Back Pain       Duration: worse in the last week        Specific cause: none    Description:   Location of pain: low back right and hip right  Character of pain: burning and when standing  Pain radiation:none  New numbness or weakness in legs, not attributed to pain:  no     Intensity:     History:   Pain interferes with job: YES,   History of back problems: no prior back problems  Any previous MRI or X-rays: None  Sees a specialist for back pain:  No  Therapies tried without relief: NSAIDs    Alleviating factors:   Improved by: sitting      Precipitating factors:  Worsened by: Standing and Walking      Concern - Vomiting  Onset:     Description:   Talk about vomiting.  Patient has that she has projectile vomiting when she is having  a bowel movement.  It does not happen every time but recently she has noticed more frequency.  Does not feel nauseous.  Denies any other problems.  She has history of IBS.  She has seen gastroenterology for EGD and colonoscopy a couple of years ago and everything was normal except for some signs of GERD.  She is currently taking proton pump inhibitor for that.          Problem list and histories reviewed & adjusted, as indicated.  Additional history: as documented    Patient Active Problem List   Diagnosis     Obesity     Fibromyalgia     Advanced directives, counseling/discussion     Allergic rhinitis     Low back pain     MIRZA (obstructive sleep apnea)- on CPAP     Status post total right knee replacement     Type 2 diabetes mellitus without complication (H)     Hyperlipidemia LDL goal <100     Mild single current episode of major depressive disorder (H)     Osteopenia     Mild intermittent asthma, unspecified whether complicated     Past Surgical History:   Procedure Laterality Date     APPENDECTOMY  1992     ARTHROPLASTY KNEE Right 6/7/2016    Procedure: ARTHROPLASTY KNEE;  Surgeon: Jose Alberto Gomez MD;  Location: [facility] OR     CARPAL TUNNEL RELEASE RT/LT  05/02, 08/02    right 5/2002, left 8/2002     COLONOSCOPY  4/2009    Normal; repeat in 10 years     COLONOSCOPY N/A 11/4/2015    Procedure: COLONOSCOPY;  Surgeon: Cathie Melendez MD;  Location:  GI     EXCHANGE POLY COMPONENT ARTHROPLASTY KNEE Right 9/5/2017    Procedure: EXCHANGE POLY COMPONENT ARTHROPLASTY KNEE;  1.  Right knee exploration and limited scar tissue excision.   2.  Tibial polyethylene insert exchange (after removal of the original polyethylene component and following the posterior cruciate ligament resection, a deep dish polyethylene #3 of 9 mm thickness was placed)     ;  Surgeon: Jose Alberto Gomez MD;  Location: [facility] OR     HYSTERECTOMY TOTAL ABDOMINAL, BILATERAL SALPINGO-OOPHORECTOMY, COMBINED  11/18/1992    Fibroids, endometriosis,   Ralph     JOINT REPLACEMENT  4/2010    bilateral thumb     MANOMETRY (ESOPHAGEAL)  10/2007    Normal     PUNC/ASPIR BREAST CYST  11/2004     TONSILLECTOMY  1950       Social History   Substance Use Topics     Smoking status: Former Smoker     Packs/day: 0.00     Years: 6.00     Types: Cigarettes     Quit date: 6/9/1974     Smokeless tobacco: Never Used      Comment: Previous smoker, 1-1.5 packs per week     Alcohol use 2.4 - 3.6 oz/week     4 - 6 Standard drinks or equivalent per week      Comment: 1 sp monthly     Family History   Problem Relation Age of Onset     Hypertension Father      Blood Disease Father      DVT's     Eye Disorder Father      Colon Cancer Father      Colon Cancer Paternal Uncle      Asthma Sister      C.A.D. Paternal Grandfather      Diabetes Maternal Aunt      Colon Cancer Maternal Uncle      Hyperlipidemia Paternal Grandmother      Hypertension Paternal Grandmother      Hyperlipidemia Other      Aunt     Hypertension Maternal Grandfather      Hypertension Other      Aunt     Breast Cancer No family hx of      Anesthesia Reaction No family hx of      Osteoporosis No family hx of      Thyroid Disease No family hx of          Current Outpatient Prescriptions   Medication Sig Dispense Refill     albuterol (PROAIR HFA/PROVENTIL HFA/VENTOLIN HFA) 108 (90 BASE) MCG/ACT Inhaler Inhale 2 puffs into the lungs every 4 hours as needed for shortness of breath / dyspnea or wheezing 1 Inhaler 5     blood glucose monitoring ([employer] CONTOUR NEXT) test strip Use to test blood sugar 2 times daily or as directed. 100 each 3     blood glucose monitoring (EMILY MICROLET) lancets Use to test blood sugar 2 times daily or as directed.  Ok to substitute alternative if insurance prefers. 100 each 3     Blood Glucose Monitoring Suppl (CONTOUR NEXT EZ MONITOR) W/DEVICE KIT 1 each daily as needed 1 kit 0     Cholecalciferol (VITAMIN D3 PO) Take 1,000 Units by mouth daily        escitalopram (LEXAPRO) 20 MG tablet Take 1  "tablet (20 mg) by mouth daily 30 tablet 1     fexofenadine-pseudoePHEDrine (ALLEGRA-D)  MG per tablet Take 1 tablet by mouth 2 times daily as needed        fluticasone-salmeterol (ADVAIR DISKUS) 500-50 MCG/DOSE diskus inhaler Inhale 1 puff into the lungs 2 times daily 2 Inhaler 0     metFORMIN (GLUCOPHAGE-XR) 500 MG 24 hr tablet Take 1 tablet (500 mg) by mouth daily (with dinner) 90 tablet 3     montelukast (SINGULAIR) 10 MG tablet TAKE 1 TABLET(10 MG) BY MOUTH AT BEDTIME 90 tablet 3     omeprazole (PRILOSEC) 20 MG CR capsule Take 1 capsule (20 mg) by mouth daily 90 capsule 3     simvastatin (ZOCOR) 20 MG tablet TAKE 1 TABLET BY MOUTH AT BEDTIME 90 tablet 1     zolpidem (AMBIEN) 10 MG tablet 1 TABLET AT BEDTIME AS NEEDED 30 tablet 0     [DISCONTINUED] ADVAIR DISKUS 500-50 MCG/DOSE diskus inhaler INHALE 1 PUFF INTO THE LUNGS TWICE DAILY 180 Inhaler 3     Allergies   Allergen Reactions     Ivp Dye [Contrast Dye] Shortness Of Breath     Claritin [Loratadine]      Hallucinations         Reviewed and updated as needed this visit by clinical staff  Tobacco  Allergies  Meds  Med Hx  Surg Hx  Fam Hx  Soc Hx      Reviewed and updated as needed this visit by Provider         ROS:  CONSTITUTIONAL: NEGATIVE for fever, chills, change in weight  ENT/MOUTH: NEGATIVE for ear, mouth and throat problems  RESP: NEGATIVE for significant cough or SOB  CV: NEGATIVE for chest pain, palpitations or peripheral edema    OBJECTIVE:                                                    /70  Pulse 71  Temp 98  F (36.7  C) (Tympanic)  Ht 5' 3\" (1.6 m)  SpO2 96%  BMI 31.89 kg/m2  Body mass index is 31.89 kg/(m^2).   GENERAL: healthy, alert, well nourished, well hydrated, no distress  HENT: ear canals- normal; TMs- normal; Nose- normal; Mouth- no ulcers, no lesions  NECK: no tenderness, no adenopathy, no asymmetry, no masses, no stiffness; thyroid- normal to palpation  RESP: lungs clear to auscultation - no rales, no rhonchi, " no wheezes  CV: regular rates and rhythm, normal S1 S2, no S3 or S4 and no murmur, no click or rub -  ABDOMEN: soft, no tenderness, no  hepatosplenomegaly, no masses, normal bowel sounds  SKIN: no suspicious lesions, no rashes  BACK: no CVA tenderness, no paralumbar tenderness         ASSESSMENT/PLAN:                                                      1. Mild intermittent asthma, unspecified whether complicated  Symptoms are uncontrolled due to running out of medication.  Advair reordered.  Use albuterol as needed.  Stay well-hydrated.  Follow-up in 1 month  - fluticasone-salmeterol (ADVAIR DISKUS) 500-50 MCG/DOSE diskus inhaler; Inhale 1 puff into the lungs 2 times daily  Dispense: 2 Inhaler; Refill: 0    2. Chronic right-sided low back pain without sciatica  Patient requested physical therapy referral.  Referral given.  - PHYSICAL THERAPY REFERRAL; Future    3. Intractable vomiting without nausea, unspecified vomiting type  Questionable etiology.  Recommending to see gastroenterology for evaluation and management  - GASTROENTEROLOGY ADULT REF CONSULT ONLY    4. Need for prophylactic vaccination and inoculation against influenza    - FLU VACCINE, INCREASED ANTIGEN, PRESV FREE, AGE 65+ [67706]  - ADMIN INFLUENZA (For MEDICARE Patients ONLY) []    1 month for annual physical examination and labs.    Chata Wallace MD  [facility]

## 2018-11-07 ASSESSMENT — ASTHMA QUESTIONNAIRES: ACT_TOTALSCORE: 16

## 2018-11-07 ASSESSMENT — ANXIETY QUESTIONNAIRES: GAD7 TOTAL SCORE: 4

## 2018-11-12 ENCOUNTER — TELEPHONE (OUTPATIENT)
Dept: ORTHOPEDICS | Facility: CLINIC | Age: 73
End: 2018-11-12

## 2018-11-12 NOTE — TELEPHONE ENCOUNTER
Attempted to reach patient to schedule 1 year PO appointment.     Left message for patient to return call [phone].        Tom Faulkner, Surgery Scheduler

## 2018-11-12 NOTE — TELEPHONE ENCOUNTER
Spoke to patient about scheduling 1 year post op appointment.  She did not have her calendar available and wish for a call back later in the day.       Tom Faulkner, Surgery Scheduler

## 2018-11-29 ENCOUNTER — TRANSFERRED RECORDS (OUTPATIENT)
Dept: HEALTH INFORMATION MANAGEMENT | Facility: CLINIC | Age: 73
End: 2018-11-29

## 2018-12-07 ENCOUNTER — OFFICE VISIT (OUTPATIENT)
Dept: ORTHOPEDICS | Facility: CLINIC | Age: 73
End: 2018-12-07
Payer: COMMERCIAL

## 2018-12-07 ENCOUNTER — RADIANT APPOINTMENT (OUTPATIENT)
Dept: GENERAL RADIOLOGY | Facility: CLINIC | Age: 73
End: 2018-12-07
Attending: ORTHOPAEDIC SURGERY
Payer: COMMERCIAL

## 2018-12-07 VITALS — DIASTOLIC BLOOD PRESSURE: 70 MMHG | SYSTOLIC BLOOD PRESSURE: 110 MMHG

## 2018-12-07 DIAGNOSIS — Z96.651 STATUS POST RIGHT KNEE REPLACEMENT: Primary | ICD-10-CM

## 2018-12-07 DIAGNOSIS — K21.9 GASTROESOPHAGEAL REFLUX DISEASE WITHOUT ESOPHAGITIS: ICD-10-CM

## 2018-12-07 DIAGNOSIS — Z96.651 HISTORY OF TOTAL RIGHT KNEE REPLACEMENT: ICD-10-CM

## 2018-12-07 PROCEDURE — 99213 OFFICE O/P EST LOW 20 MIN: CPT | Performed by: ORTHOPAEDIC SURGERY

## 2018-12-07 PROCEDURE — 73562 X-RAY EXAM OF KNEE 3: CPT | Mod: RT | Performed by: ORTHOPAEDIC SURGERY

## 2018-12-07 NOTE — TELEPHONE ENCOUNTER
"Requested Prescriptions   Pending Prescriptions Disp Refills     omeprazole (PRILOSEC) 20 MG DR capsule [Pharmacy Med Name: OMEPRAZOLE DR 20 MG CAPSULE] 90 capsule 2    Last Written Prescription Date:  12/8/17  Last Fill Quantity: 90,  # refills: 3   Last office visit: 11/6/2018 with prescribing provider:  YES    Future Office Visit:   Next 5 appointments (look out 90 days)     Dec 07, 2018 11:10 AM CST   Return Visit with Jose Alberto Gomez MD   [facility] ORTHOPEDIC SURGERY ([facility])    [address]  [facility] [address]   [phone]            Dec 10, 2018 11:00 AM CST   PHYSICAL with Chata Wallace MD   [facility]    [address] [id number] [phone]            Dec 12, 2018  2:00 PM CST   [facility]    [address] [place] [address] [address]  [facility] [address]   [phone]                  Sig: TAKE 1 CAPSULE BY MOUTH DAILY    PPI Protocol Passed    12/7/2018  1:31 AM       Passed - Not on Clopidogrel (unless Pantoprazole ordered)       Passed - No diagnosis of osteoporosis on record       Passed - Recent (12 mo) or future (30 days) visit within the authorizing provider's specialty    Patient had office visit in the last 12 months or has a visit in the next 30 days with authorizing provider or within the authorizing provider's specialty.  See \"Patient Info\" tab in inbasket, or \"Choose Columns\" in Meds & Orders section of the refill encounter.             Passed - Patient is age 18 or older       Passed - No active pregnacy on record       Passed - No positive pregnancy test in past 12 months          "

## 2018-12-07 NOTE — MR AVS SNAPSHOT
After Visit Summary   12/7/2018    Claribel Del Rio    MRN: [id number]           Patient Information     Date Of Birth          1945        Visit Information        Provider Department      12/7/2018 11:10 AM Jose Alberto Gomez MD [facility] ORTHOPEDIC SURGERY        Today's Diagnoses     Status post right knee replacement    -  1      Care Instructions    Continue with stretching exercises  Patella sleeve when walking and standing more than 4 hours at a time  Continue with prophylactic antibiotics for dental procedures  Otherwise, follow-up as needed.          Follow-ups after your visit        Your next 10 appointments already scheduled     Dec 10, 2018 11:00 AM CST   PHYSICAL with Chata Wallace MD   [facility]    [address] [address] [phone]            Dec 12, 2018  2:00 PM CST   PHYSICAL with Crystal Watts MD   [facility] ([facility])    [address] [place] [address] [phone]              Who to contact     If you have questions or need follow up information about today's clinic visit or your schedule please contact [facility] ORTHOPEDIC SURGERY directly at [phone].  Normal or non-critical lab and imaging results will be communicated to you by MyChart, letter or phone within 4 business days after the clinic has received the results. If you do not hear from us within 7 days, please contact the clinic through MyChart or phone. If you have a critical or abnormal lab result, we will notify you by phone as soon as possible.  Submit refill requests through [employer] or call your pharmacy and they will forward the refill request to us. Please allow 3 business days for your refill to be completed.          Additional Information About Your Visit        Care EveryWhere ID     This is your Care EveryWhere ID. This could be  used by other organizations to access your [facility] medical records  [id number]         Blood Pressure from Last 3 Encounters:   12/07/18 110/70   11/06/18 114/70   01/30/18 130/80    Weight from Last 3 Encounters:   12/08/17 180 lb (81.6 kg)   11/17/17 180 lb (81.6 kg)   08/21/17 180 lb (81.6 kg)                 Today's Medication Changes          These changes are accurate as of 12/7/18 12:17 PM.  If you have any questions, ask your nurse or doctor.               These medicines have changed or have updated prescriptions.        Dose/Directions    omeprazole 20 MG DR capsule   Commonly known as:  priLOSEC   This may have changed:  See the new instructions.   Used for:  Gastroesophageal reflux disease without esophagitis   Changed by:  Chata Wallace MD        TAKE 1 CAPSULE BY MOUTH DAILY   Quantity:  90 capsule   Refills:  3            Where to get your medicines      These medications were sent to [place] [address] [place] [facility] [address] [facility] [address]     Phone:  [phone]     omeprazole 20 MG DR capsule                Primary Care Provider Office Phone # Fax #    Chata Wallace [phone]       [address]  [place] [address]        Equal Access to Services     GAMA MORELOS AH: Hadjeremy sandovalo Sokristinali, waaxda luqadaha, qaybta kaalmada adeegyada, thalia márquez. So [facility] [phone].    ATENCIÓN: Si habla español, tiene a schaeffer disposición servicios gratuitos de asistencia lingüística. Llame al [phone].    We comply with applicable federal civil rights laws and [place] laws. We do not discriminate on the basis of race, color, national origin, age, disability, sex, sexual orientation, or gender identity.            Thank you!     Thank you for choosing [facility] ORTHOPEDIC SURGERY  for your care. Our goal is always to provide you with excellent care. Hearing back from our patients is one way we can continue  to improve our services. Please take a few minutes to complete the written survey that you may receive in the mail after your visit with us. Thank you!             Your Updated Medication List - Protect others around you: Learn how to safely use, store and throw away your medicines at www.disposemymeds.org.          This list is accurate as of 12/7/18 12:17 PM.  Always use your most recent med list.                   Brand Name Dispense Instructions for use Diagnosis    albuterol 108 (90 Base) MCG/ACT inhaler    PROAIR HFA/PROVENTIL HFA/VENTOLIN HFA    1 Inhaler    Inhale 2 puffs into the lungs every 4 hours as needed for shortness of breath / dyspnea or wheezing    Moderate persistent asthma without complication       blood glucose monitoring lancets     100 each    Use to test blood sugar 2 times daily or as directed.  Ok to substitute alternative if insurance prefers.    Type 2 diabetes mellitus without complication, without long-term current use of insulin (H)       blood glucose monitoring test strip    [employer] CONTOUR NEXT    100 each    Use to test blood sugar 2 times daily or as directed.    Type 2 diabetes mellitus without complication, without long-term current use of insulin (H)       CONTOUR NEXT EZ MONITOR w/Device Kit     1 kit    1 each daily as needed    Type 2 diabetes mellitus without complication, without long-term current use of insulin (H)       escitalopram 20 MG tablet    LEXAPRO    30 tablet    Take 1 tablet (20 mg) by mouth daily    Major depression       fexofenadine-pseudoePHEDrine  MG 12 hr tablet    ALLEGRA-D     Take 1 tablet by mouth 2 times daily as needed        fluticasone-salmeterol 500-50 MCG/DOSE inhaler    ADVAIR DISKUS    2 Inhaler    Inhale 1 puff into the lungs 2 times daily    Mild intermittent asthma, unspecified whether complicated       metFORMIN 500 MG 24 hr tablet    GLUCOPHAGE-XR    90 tablet    Take 1 tablet (500 mg) by mouth daily (with dinner)    Type 2 diabetes  mellitus without complication, without long-term current use of insulin (H)       montelukast 10 MG tablet    SINGULAIR    90 tablet    TAKE 1 TABLET(10 MG) BY MOUTH AT BEDTIME    Mild intermittent asthma, unspecified whether complicated       omeprazole 20 MG DR capsule    priLOSEC    90 capsule    TAKE 1 CAPSULE BY MOUTH DAILY    Gastroesophageal reflux disease without esophagitis       simvastatin 20 MG tablet    ZOCOR    90 tablet    TAKE 1 TABLET BY MOUTH AT BEDTIME    Hyperlipidemia LDL goal <100       VITAMIN D3 PO      Take 1,000 Units by mouth daily        zolpidem 10 MG tablet    AMBIEN    30 tablet    1 TABLET AT BEDTIME AS NEEDED    Insomnia

## 2018-12-07 NOTE — LETTER
12/7/2018         RE: Claribel Del Rio  [address] [place] [address]        Dear Colleague,    Thank you for referring your patient, Claribel Del Rio, to the [facility] ORTHOPEDIC SURGERY. Please see a copy of my visit note below.    HISTORY OF PRESENT ILLNESS:    Claribel Del Rio is a 73 year old female who is seen in follow up for right TKA, DOS 6/7/16 and polyethylene exchange with a scar to see excision September 5, 2018,  Dr. Jose Alberto Gomez.  Present symptoms:  Patient notes intermittent pain in the knee, she notes it is moderate with few bouts of severe pain. Patient was unsure of pain location, but believes it is anterior knee pain behind the knee cap.  Pain can be sharp and dull.  Patient reports decreased motion in the right in flexion. She reports difficulties going down stairs, putting on socks, and shoes.  Occasional clicking in the knee especially with going up stairs. No grinding or clicking.  Mild intermittent swelling.  Current pain level: 0/10, Worse pain level: 5/10.   Treatments tried to this point:  Aleve  Past Medical History: Unchanged from the visit of April 1, 2016. Please refer to that note.    REVIEW OF SYSTEMS:  CONSTITUTIONAL:  NEGATIVE for fever, chills, change in weight  INTEGUMENTARY/SKIN:  psoriasis  EYES:  NEGATIVE for vision changes or irritation  ENT/MOUTH:  NEGATIVE for ear, mouth and throat problems  RESP:  NEGATIVE for significant cough or SOB  BREAST:  NEGATIVE for masses, tenderness or discharge  CV:  NEGATIVE for chest pain, palpitations or peripheral edema  GI:  reflux  :  Negative   MUSCULOSKELETAL:  See HPI above  NEURO:  NEGATIVE for weakness, dizziness or paresthesias  ENDOCRINE:  NEGATIVE for temperature intolerance, skin/hair changes  HEME/ALLERGY/IMMUNE:  NEGATIVE for bleeding problems  PSYCHIATRIC:  depression      PHYSICAL EXAM:  /70 (BP Location: Right arm, Patient Position: Chair, Cuff Size: Adult Regular)  There is no height or weight on file  to calculate BMI.   GENERAL APPEARANCE: healthy, alert and no distress   SKIN: no suspicious lesions or rashes  NEURO: Normal strength and tone, mentation intact and speech normal  VASCULAR:  good pulses, and cappillary refill   LYMPH: no lymphadenopathy   PSYCH:  mentation appears normal and affect normal/bright    MSK:  Right knee incision is healed well  No erythema noted  No significant swelling noted  She is able to get up from sitting quite readily  She is able to walk without limp  Range of motion is back to 0-95  This is a slight decrease of range of motion from before the scar to see excision  Obviously, she has lost some of the range of motion that she gained from the second operation    IMAGING INTERPRETATION:    3 views of the right knee demonstrate well maintained total knee arthroplasty components without any evidence of loosening.  Patellofemoral tracking is within normal limits.  No evidence of component loosening, ostial lysis or other bony pathology are noted.    ASSESSMENT:    1 year postop from total knee arthroplasty      PLAN:  The x-rays of the right knee from today were visualized.  Findings were explained.  It appears that she has lost some of the range of motion that she gained from the scar to see excision.  However, she is walking well without noticeable limp.  No evidence of infection at this point.  Continuing efforts in stretching exercises was felt to be the best option at this point.  We also suggested using a knee sleeve if she is on her feet more than 4 hours at a time.  She has been working part-time and works 4-6 hours a day.  Follow-up as needed.             Jose Alberto Gomez MD  Dept. Orthopedic Surgery  [facility]       Disclaimer: This note consists of symbols derived from keyboarding, dictation and/or voice recognition software. As a result, there may be errors in the script that have gone undetected. Please consider this when interpreting information found in this  chart.      Again, thank you for allowing me to participate in the care of your patient.        Sincerely,        Jose Alberto Gomez MD

## 2018-12-07 NOTE — PATIENT INSTRUCTIONS
Continue with stretching exercises  Patella sleeve when walking and standing more than 4 hours at a time  Continue with prophylactic antibiotics for dental procedures  Otherwise, follow-up as needed.

## 2018-12-07 NOTE — PROGRESS NOTES
HISTORY OF PRESENT ILLNESS:    Claribel Del Rio is a 73 year old female who is seen in follow up for right TKA, DOS 6/7/16 and polyethylene exchange with a scar to see excision September 5, 2018,  Dr. Jose Alberto Gomez.  Present symptoms:  Patient notes intermittent pain in the knee, she notes it is moderate with few bouts of severe pain. Patient was unsure of pain location, but believes it is anterior knee pain behind the knee cap.  Pain can be sharp and dull.  Patient reports decreased motion in the right in flexion. She reports difficulties going down stairs, putting on socks, and shoes.  Occasional clicking in the knee especially with going up stairs. No grinding or clicking.  Mild intermittent swelling.  Current pain level: 0/10, Worse pain level: 5/10.   Treatments tried to this point:  Aleve  Past Medical History: Unchanged from the visit of April 1, 2016. Please refer to that note.    REVIEW OF SYSTEMS:  CONSTITUTIONAL:  NEGATIVE for fever, chills, change in weight  INTEGUMENTARY/SKIN:  psoriasis  EYES:  NEGATIVE for vision changes or irritation  ENT/MOUTH:  NEGATIVE for ear, mouth and throat problems  RESP:  NEGATIVE for significant cough or SOB  BREAST:  NEGATIVE for masses, tenderness or discharge  CV:  NEGATIVE for chest pain, palpitations or peripheral edema  GI:  reflux  :  Negative   MUSCULOSKELETAL:  See HPI above  NEURO:  NEGATIVE for weakness, dizziness or paresthesias  ENDOCRINE:  NEGATIVE for temperature intolerance, skin/hair changes  HEME/ALLERGY/IMMUNE:  NEGATIVE for bleeding problems  PSYCHIATRIC:  depression      PHYSICAL EXAM:  /70 (BP Location: Right arm, Patient Position: Chair, Cuff Size: Adult Regular)  There is no height or weight on file to calculate BMI.   GENERAL APPEARANCE: healthy, alert and no distress   SKIN: no suspicious lesions or rashes  NEURO: Normal strength and tone, mentation intact and speech normal  VASCULAR:  good pulses, and cappillary refill   LYMPH: no  lymphadenopathy   PSYCH:  mentation appears normal and affect normal/bright    MSK:  Right knee incision is healed well  No erythema noted  No significant swelling noted  She is able to get up from sitting quite readily  She is able to walk without limp  Range of motion is back to 0-95  This is a slight decrease of range of motion from before the scar to see excision  Obviously, she has lost some of the range of motion that she gained from the second operation    IMAGING INTERPRETATION:    3 views of the right knee demonstrate well maintained total knee arthroplasty components without any evidence of loosening.  Patellofemoral tracking is within normal limits.  No evidence of component loosening, ostial lysis or other bony pathology are noted.    ASSESSMENT:    1 year postop from total knee arthroplasty      PLAN:  The x-rays of the right knee from today were visualized.  Findings were explained.  It appears that she has lost some of the range of motion that she gained from the scar to see excision.  However, she is walking well without noticeable limp.  No evidence of infection at this point.  Continuing efforts in stretching exercises was felt to be the best option at this point.  We also suggested using a knee sleeve if she is on her feet more than 4 hours at a time.  She has been working part-time and works 4-6 hours a day.  Follow-up as needed.             Jose Alberto Gomez MD  Dept. Orthopedic Surgery  [facility]       Disclaimer: This note consists of symbols derived from keyboarding, dictation and/or voice recognition software. As a result, there may be errors in the script that have gone undetected. Please consider this when interpreting information found in this chart.

## 2018-12-10 ENCOUNTER — OFFICE VISIT (OUTPATIENT)
Dept: FAMILY MEDICINE | Facility: CLINIC | Age: 73
End: 2018-12-10
Payer: COMMERCIAL

## 2018-12-10 VITALS
HEART RATE: 78 BPM | SYSTOLIC BLOOD PRESSURE: 106 MMHG | WEIGHT: 181 LBS | DIASTOLIC BLOOD PRESSURE: 76 MMHG | TEMPERATURE: 96.5 F | BODY MASS INDEX: 32.07 KG/M2 | HEIGHT: 63 IN

## 2018-12-10 DIAGNOSIS — E11.9 TYPE 2 DIABETES MELLITUS WITHOUT COMPLICATION, WITHOUT LONG-TERM CURRENT USE OF INSULIN (H): ICD-10-CM

## 2018-12-10 DIAGNOSIS — E78.5 HYPERLIPIDEMIA LDL GOAL <100: ICD-10-CM

## 2018-12-10 DIAGNOSIS — F32.0 MILD SINGLE CURRENT EPISODE OF MAJOR DEPRESSIVE DISORDER (H): ICD-10-CM

## 2018-12-10 DIAGNOSIS — Z00.00 ANNUAL PHYSICAL EXAM: Primary | ICD-10-CM

## 2018-12-10 DIAGNOSIS — J45.20 MILD INTERMITTENT ASTHMA WITHOUT COMPLICATION: ICD-10-CM

## 2018-12-10 DIAGNOSIS — Z12.31 VISIT FOR SCREENING MAMMOGRAM: ICD-10-CM

## 2018-12-10 DIAGNOSIS — K21.9 GASTROESOPHAGEAL REFLUX DISEASE WITHOUT ESOPHAGITIS: ICD-10-CM

## 2018-12-10 DIAGNOSIS — Z78.0 ASYMPTOMATIC POSTMENOPAUSAL STATUS: ICD-10-CM

## 2018-12-10 LAB
HBA1C MFR BLD: 6.7 % (ref 0–5.6)
HGB BLD-MCNC: 13.7 G/DL (ref 11.7–15.7)

## 2018-12-10 PROCEDURE — 84443 ASSAY THYROID STIM HORMONE: CPT | Performed by: FAMILY MEDICINE

## 2018-12-10 PROCEDURE — 99397 PER PM REEVAL EST PAT 65+ YR: CPT | Performed by: FAMILY MEDICINE

## 2018-12-10 PROCEDURE — 80061 LIPID PANEL: CPT | Performed by: FAMILY MEDICINE

## 2018-12-10 PROCEDURE — 85018 HEMOGLOBIN: CPT | Performed by: FAMILY MEDICINE

## 2018-12-10 PROCEDURE — 82043 UR ALBUMIN QUANTITATIVE: CPT | Performed by: FAMILY MEDICINE

## 2018-12-10 PROCEDURE — 99214 OFFICE O/P EST MOD 30 MIN: CPT | Mod: 25 | Performed by: FAMILY MEDICINE

## 2018-12-10 PROCEDURE — 99207 C FOOT EXAM  NO CHARGE: CPT | Mod: 25 | Performed by: FAMILY MEDICINE

## 2018-12-10 PROCEDURE — 83036 HEMOGLOBIN GLYCOSYLATED A1C: CPT | Performed by: FAMILY MEDICINE

## 2018-12-10 PROCEDURE — 36415 COLL VENOUS BLD VENIPUNCTURE: CPT | Performed by: FAMILY MEDICINE

## 2018-12-10 PROCEDURE — 80053 COMPREHEN METABOLIC PANEL: CPT | Performed by: FAMILY MEDICINE

## 2018-12-10 RX ORDER — SIMVASTATIN 20 MG
20 TABLET ORAL AT BEDTIME
Qty: 90 TABLET | Refills: 3 | Status: SHIPPED | OUTPATIENT
Start: 2018-12-10 | End: 2018-12-11

## 2018-12-10 RX ORDER — ALBUTEROL SULFATE 90 UG/1
2 AEROSOL, METERED RESPIRATORY (INHALATION) EVERY 4 HOURS PRN
Qty: 1 INHALER | Refills: 5 | Status: SHIPPED | OUTPATIENT
Start: 2018-12-10 | End: 2020-11-02

## 2018-12-10 RX ORDER — METFORMIN HCL 500 MG
500 TABLET, EXTENDED RELEASE 24 HR ORAL
Qty: 90 TABLET | Refills: 3 | Status: SHIPPED | OUTPATIENT
Start: 2018-12-10 | End: 2019-11-01

## 2018-12-10 RX ORDER — MONTELUKAST SODIUM 10 MG/1
TABLET ORAL
Qty: 90 TABLET | Refills: 3 | Status: SHIPPED | OUTPATIENT
Start: 2018-12-10 | End: 2020-02-03

## 2018-12-10 ASSESSMENT — ANXIETY QUESTIONNAIRES
3. WORRYING TOO MUCH ABOUT DIFFERENT THINGS: SEVERAL DAYS
6. BECOMING EASILY ANNOYED OR IRRITABLE: NOT AT ALL
2. NOT BEING ABLE TO STOP OR CONTROL WORRYING: SEVERAL DAYS
1. FEELING NERVOUS, ANXIOUS, OR ON EDGE: NOT AT ALL
5. BEING SO RESTLESS THAT IT IS HARD TO SIT STILL: NOT AT ALL
GAD7 TOTAL SCORE: 2
7. FEELING AFRAID AS IF SOMETHING AWFUL MIGHT HAPPEN: NOT AT ALL
IF YOU CHECKED OFF ANY PROBLEMS ON THIS QUESTIONNAIRE, HOW DIFFICULT HAVE THESE PROBLEMS MADE IT FOR YOU TO DO YOUR WORK, TAKE CARE OF THINGS AT HOME, OR GET ALONG WITH OTHER PEOPLE: NOT DIFFICULT AT ALL

## 2018-12-10 ASSESSMENT — PATIENT HEALTH QUESTIONNAIRE - PHQ9
SUM OF ALL RESPONSES TO PHQ QUESTIONS 1-9: 3
5. POOR APPETITE OR OVEREATING: NOT AT ALL

## 2018-12-10 ASSESSMENT — MIFFLIN-ST. JEOR: SCORE: 1305.14

## 2018-12-10 NOTE — NURSING NOTE
"Chief Complaint   Patient presents with     Physical     pt is fasting       Initial /76   Pulse 78   Temp 96.5  F (35.8  C) (Tympanic)   Ht 1.6 m (5' 3\")   Wt 82.1 kg (181 lb)   Breastfeeding? No   BMI 32.06 kg/m   Estimated body mass index is 32.06 kg/m  as calculated from the following:    Height as of this encounter: 1.6 m (5' 3\").    Weight as of this encounter: 82.1 kg (181 lb).  BP completed using cuff size: regular    Health Maintenance Due   Topic Date Due     DTAP/TDAP/TD IMMUNIZATION (1 - Tdap) 05/11/1952     ZOSTER IMMUNIZATION (1 of 2) 05/11/1997     PNEUMOVAX IMMUNIZATION 65+ LOW/MEDIUM RISK (1 of 2 - PCV13) 05/11/2012     EYE EXAM Q1 YEAR  12/20/2017     A1C Q6 MO  06/08/2018     MAMMO SCREEN Q2 YR (SYSTEM ASSIGNED)  07/29/2018     ASTHMA ACTION PLAN Q1 YR  08/30/2018     FOOT EXAM Q1 YEAR  12/08/2018     CREATININE Q1 YEAR  12/08/2018     LIPID MONITORING Q1 YEAR  12/08/2018     MICROALBUMIN Q1 YEAR  12/08/2018     FALL RISK ASSESSMENT  12/08/2018     WELLNESS VISIT Q1 YR  12/08/2018         Martha Schumacher MA 12/10/18        "

## 2018-12-10 NOTE — PROGRESS NOTES
"  SUBJECTIVE:   Claribel Del Rio is a 73 year old female who presents for Preventive Visit.      Are you in the first 12 months of your Medicare Part B coverage?  No    Physical Health:    In general, how would you rate your overall physical health? good    Outside of work, how many days during the week do you exercise? none    Outside of work, approximately how many minutes a day do you exercise?not applicable    If you drink alcohol do you typically have >3 drinks per day or >7 drinks per week? No    Do you usually eat at least 4 servings of fruit and vegetables a day, include whole grains & fiber and avoid regularly eating high fat or \"junk\" foods? Yes    Do you have any problems taking medications regularly?  No    Do you have any side effects from medications? none    Needs assistance for the following daily activities: no assistance needed    Which of the following safety concerns are present in your home?  none identified     Hearing impairment: Yes, Difficulty understanding soft or whispered speech.    In the past 6 months, have you been bothered by leaking of urine? no    Mental Health:    In general, how would you rate your overall mental or emotional health? good  PHQ-2 Score:      Do you feel safe in your environment? Yes    Do you have a Health Care Directive? No: Advance care planning reviewed with patient; information given to patient to review.    Additional concerns to address?  No    Fall risk:       Cognitive Screenin) Repeat 3 items (Leader, Season, Table)    2) Clock draw: NORMAL  3) 3 item recall: Recalls 3 objects  Results: 3 items recalled: COGNITIVE IMPAIRMENT LESS LIKELY    Mini-CogTM Copyright ROGER De La O. Licensed by the author for use in [facility]; reprinted with permission ([email or web address]@.[email or web address]). All rights reserved.      Do you have sleep apnea, excessive snoring or daytime drowsiness?: yes, cpap        Diabetes Follow-up      Diabetic concerns: None     Symptoms of " hypoglycemia (low blood sugar): none     Paresthesias (numbness or burning in feet) or sores: No      BP Readings from Last 2 Encounters:   18 124/56   12/10/18 106/76     Hemoglobin A1C (%)   Date Value   12/10/2018 6.7 (H)   2017 6.2 (H)     LDL Cholesterol Calculated (mg/dL)   Date Value   12/10/2018 109 (H)   2017 90       Diabetes Management Resources  Hyperlipidemia Follow-Up      Rate your low fat/cholesterol diet?: good    Taking statin?  Yes, no muscle aches from statin    Other lipid medications/supplements?:  none    Depression Followup    Status since last visit: Stable     See PHQ-9 for current symptoms.  Other associated symptoms: None    Complicating factors:   Significant life event:  No   Current substance abuse:  None  Anxiety or Panic symptoms:  No    PHQ 2018 12/10/2018 2018   PHQ-9 Total Score 13 3 1   Q9: Suicide Ideation Several days Not at all Not at all       PHQ-9  English  PHQ-9   Any Language  Suicide Assessment Five-step Evaluation and Treatment (SAFE-T)  Asthma Follow-Up    Was ACT completed today?    Yes    ACT Total Scores 12/10/2018   ACT TOTAL SCORE -   ASTHMA ER VISITS -   ASTHMA HOSPITALIZATIONS -   ACT TOTAL SCORE (Goal Greater than or Equal to 20) 19   In the past 12 months, how many times did you visit the emergency room for your asthma without being admitted to the hospital? 0   In the past 12 months, how many times were you hospitalized overnight because of your asthma? 0       Recent asthma triggers that patient is dealing with: None        Reviewed and updated as needed this visit by clinical staff         Reviewed and updated as needed this visit by Provider        Social History     Tobacco Use     Smoking status: Former Smoker     Packs/day: 0.00     Years: 6.00     Pack years: 0.00     Types: Cigarettes     Last attempt to quit: 1974     Years since quittin.5     Smokeless tobacco: Never Used     Tobacco comment: Previous smoker,  1-1.5 packs per week   Substance Use Topics     Alcohol use: Yes     Alcohol/week: 2.4 - 3.6 oz     Types: 4 - 6 Standard drinks or equivalent per week     Comment: 1 sp monthly                           Current providers sharing in care for this patient include:   Patient Care Team:  Chata Wallace MD as PCP - General (Family Practice)    The following health maintenance items are reviewed in Epic and correct as of today:  Health Maintenance   Topic Date Due     DTAP/TDAP/TD IMMUNIZATION (1 - Tdap) 05/11/1952     ZOSTER IMMUNIZATION (1 of 2) 05/11/1997     PNEUMOVAX IMMUNIZATION 65+ LOW/MEDIUM RISK (1 of 2 - PCV13) 05/11/2012     EYE EXAM Q1 YEAR  12/20/2017     A1C Q6 MO  06/08/2018     MAMMO SCREEN Q2 YR (SYSTEM ASSIGNED)  07/29/2018     ASTHMA ACTION PLAN Q1 YR  08/30/2018     FOOT EXAM Q1 YEAR  12/08/2018     CREATININE Q1 YEAR  12/08/2018     LIPID MONITORING Q1 YEAR  12/08/2018     MICROALBUMIN Q1 YEAR  12/08/2018     FALL RISK ASSESSMENT  12/08/2018     WELLNESS VISIT Q1 YR  12/08/2018     ASTHMA CONTROL TEST Q6 MOS  05/06/2019     PHQ-9 Q6 MONTHS  05/06/2019     TSH W/ FREE T4 REFLEX Q2 YEAR  12/08/2019     ADVANCE DIRECTIVE PLANNING Q5 YRS  08/30/2022     COLON CANCER SCREEN (SYSTEM ASSIGNED)  05/10/2026     DEXA SCAN SCREENING (SYSTEM ASSIGNED)  Completed     DEPRESSION ACTION PLAN  Completed     INFLUENZA VACCINE  Completed     HEPATITIS C SCREENING  Completed     IPV IMMUNIZATION  Aged Out     MENINGITIS IMMUNIZATION  Aged Out     Labs reviewed in EPIC  BP Readings from Last 3 Encounters:   12/12/18 124/56   12/10/18 106/76   12/07/18 110/70    Wt Readings from Last 3 Encounters:   12/10/18 82.1 kg (181 lb)   12/08/17 81.6 kg (180 lb)   11/17/17 81.6 kg (180 lb)                  Patient Active Problem List   Diagnosis     Obesity     Fibromyalgia     Advanced directives, counseling/discussion     Allergic rhinitis     Low back pain     MIRZA (obstructive sleep apnea)- on CPAP     Mild intermittent asthma  without complication     Status post total right knee replacement     Type 2 diabetes mellitus without complication (H)     Hyperlipidemia LDL goal <100     Mild single current episode of major depressive disorder (H)     Osteopenia     Mild intermittent asthma, unspecified whether complicated     Past Surgical History:   Procedure Laterality Date     APPENDECTOMY       ARTHROPLASTY KNEE Right 2016    Procedure: ARTHROPLASTY KNEE;  Surgeon: Jose Alberto Gomez MD;  Location: [facility] OR     CARPAL TUNNEL RELEASE RT/LT  ,     right 2002, left 2002     COLONOSCOPY  2009    Normal; repeat in 10 years     COLONOSCOPY N/A 2015    Procedure: COLONOSCOPY;  Surgeon: Cathie Melendez MD;  Location:  GI     EXCHANGE POLY COMPONENT ARTHROPLASTY KNEE Right 2017    Procedure: EXCHANGE POLY COMPONENT ARTHROPLASTY KNEE;  1.  Right knee exploration and limited scar tissue excision.   2.  Tibial polyethylene insert exchange (after removal of the original polyethylene component and following the posterior cruciate ligament resection, a deep dish polyethylene #3 of 9 mm thickness was placed)     ;  Surgeon: Jose Alberto Gomez MD;  Location: [facility] OR     HYSTERECTOMY TOTAL ABDOMINAL, BILATERAL SALPINGO-OOPHORECTOMY, COMBINED  1992    Fibroids, endometriosis, Dr Rosas     JOINT REPLACEMENT  2010    bilateral thumb     MANOMETRY (ESOPHAGEAL)  10/2007    Normal     PUNC/ASPIR BREAST CYST  2004     TONSILLECTOMY  1950       Social History     Tobacco Use     Smoking status: Former Smoker     Packs/day: 0.00     Years: 6.00     Pack years: 0.00     Types: Cigarettes     Last attempt to quit: 1974     Years since quittin.5     Smokeless tobacco: Never Used     Tobacco comment: Previous smoker, 1-1.5 packs per week   Substance Use Topics     Alcohol use: Yes     Alcohol/week: 2.4 - 3.6 oz     Types: 4 - 6 Standard drinks or equivalent per week     Comment: 1 sp monthly     Family History    Problem Relation Age of Onset     Hypertension Father      Blood Disease Father         DVT's     Eye Disorder Father      Colon Cancer Father      Colon Cancer Paternal Uncle      Asthma Sister      C.A.D. Paternal Grandfather      Diabetes Maternal Aunt      Colon Cancer Maternal Uncle      Hyperlipidemia Paternal Grandmother      Hypertension Paternal Grandmother      Hyperlipidemia Other         Aunt     Hypertension Maternal Grandfather      Hypertension Other         Aunt     Breast Cancer No family hx of      Anesthesia Reaction No family hx of      Osteoporosis No family hx of      Thyroid Disease No family hx of          Current Outpatient Medications   Medication Sig Dispense Refill     albuterol (PROAIR HFA/PROVENTIL HFA/VENTOLIN HFA) 108 (90 Base) MCG/ACT inhaler Inhale 2 puffs into the lungs every 4 hours as needed for shortness of breath / dyspnea or wheezing (Patient not taking: Reported on 12/12/2018) 1 Inhaler 5     blood glucose monitoring (In Hand Guides CONTOUR NEXT) test strip Use to test blood sugar 2 times daily or as directed. 100 each 3     blood glucose monitoring (In Hand Guides MICROLET) lancets Use to test blood sugar 2 times daily or as directed.  Ok to substitute alternative if insurance prefers. 100 each 3     Blood Glucose Monitoring Suppl (CONTOUR NEXT EZ MONITOR) W/DEVICE KIT 1 each daily as needed 1 kit 0     Cholecalciferol (VITAMIN D3 PO) Take 1,000 Units by mouth daily        escitalopram (LEXAPRO) 20 MG tablet Take 1 tablet (20 mg) by mouth daily 30 tablet 1     fexofenadine-pseudoePHEDrine (ALLEGRA-D)  MG per tablet Take 1 tablet by mouth 2 times daily as needed        fluticasone-salmeterol (ADVAIR DISKUS) 500-50 MCG/DOSE inhaler Inhale 1 puff into the lungs 2 times daily 2 Inhaler 0     metFORMIN (GLUCOPHAGE-XR) 500 MG 24 hr tablet Take 1 tablet (500 mg) by mouth daily (with dinner) 90 tablet 3     montelukast (SINGULAIR) 10 MG tablet TAKE 1 TABLET(10 MG) BY MOUTH AT BEDTIME 90  "tablet 3     omeprazole (PRILOSEC) 20 MG DR capsule Take 1 capsule (20 mg) by mouth daily 90 capsule 3     zolpidem (AMBIEN) 10 MG tablet 1 TABLET AT BEDTIME AS NEEDED (Patient not taking: Reported on 12/12/2018) 30 tablet 0     MELATONIN PO Take 5 mg by mouth       simvastatin (ZOCOR) 40 MG tablet Take 1 tablet (40 mg) by mouth At Bedtime 90 tablet 1     Allergies   Allergen Reactions     Ivp Dye [Contrast Dye] Shortness Of Breath     Claritin [Loratadine]      Hallucinations           ROS:  CONSTITUTIONAL: NEGATIVE for fever, chills, change in weight  INTEGUMENTARY/SKIN: NEGATIVE for worrisome rashes, moles or lesions  EYES: NEGATIVE for vision changes or irritation  ENT/MOUTH: NEGATIVE for ear, mouth and throat problems  RESP: NEGATIVE for significant cough or SOB  BREAST: NEGATIVE for masses, tenderness or discharge  CV: NEGATIVE for chest pain, palpitations or peripheral edema  GI: NEGATIVE for nausea, abdominal pain, heartburn, or change in bowel habits  : NEGATIVE for frequency, dysuria, or hematuria  MUSCULOSKELETAL: NEGATIVE for significant arthralgias or myalgia  NEURO: NEGATIVE for weakness, dizziness or paresthesias  ENDOCRINE: NEGATIVE for temperature intolerance, skin/hair changes  HEME: NEGATIVE for bleeding problems  PSYCHIATRIC: NEGATIVE for changes in mood or affect    OBJECTIVE:   There were no vitals taken for this visit. Estimated body mass index is 31.89 kg/m  as calculated from the following:    Height as of 11/6/18: 1.6 m (5' 3\").    Weight as of 12/8/17: 81.6 kg (180 lb).  EXAM:   GENERAL: healthy, alert and no distress  EYES: Eyes grossly normal to inspection, PERRL and conjunctivae and sclerae normal  HENT: ear canals and TM's normal, nose and mouth without ulcers or lesions  NECK: no adenopathy, no asymmetry, masses, or scars and thyroid normal to palpation  RESP: lungs clear to auscultation - no rales, rhonchi or wheezes  BREAST: normal without masses, tenderness or nipple discharge " and no palpable axillary masses or adenopathy  CV: regular rate and rhythm, normal S1 S2, no S3 or S4, no murmur, click or rub, no peripheral edema and peripheral pulses strong  ABDOMEN: soft, nontender, no hepatosplenomegaly, no masses and bowel sounds normal  MS: no gross musculoskeletal defects noted, no edema  SKIN: no suspicious lesions or rashes  NEURO: Normal strength and tone, mentation intact and speech normal  PSYCH: mentation appears normal, affect normal/bright  Foot exam: Normal monofilament testing noted bilaterally.  No skin break.    Results for orders placed or performed in visit on 12/10/18   Lipid panel reflex to direct LDL Fasting   Result Value Ref Range    Cholesterol 180 <200 mg/dL    Triglycerides 145 <150 mg/dL    HDL Cholesterol 42 (L) >49 mg/dL    LDL Cholesterol Calculated 109 (H) <100 mg/dL    Non HDL Cholesterol 138 (H) <130 mg/dL   TSH with free T4 reflex   Result Value Ref Range    TSH 0.88 0.40 - 4.00 mU/L   Comprehensive metabolic panel   Result Value Ref Range    Sodium 140 133 - 144 mmol/L    Potassium 4.0 3.4 - 5.3 mmol/L    Chloride 108 94 - 109 mmol/L    Carbon Dioxide 22 20 - 32 mmol/L    Anion Gap 10 3 - 14 mmol/L    Glucose 117 (H) 70 - 99 mg/dL    Urea Nitrogen 27 7 - 30 mg/dL    Creatinine 0.68 0.52 - 1.04 mg/dL    GFR Estimate 85 >60 mL/min/1.7m2    GFR Estimate If Black >90 >60 mL/min/1.7m2    Calcium 9.6 8.5 - 10.1 mg/dL    Bilirubin Total 0.8 0.2 - 1.3 mg/dL    Albumin 3.9 3.4 - 5.0 g/dL    Protein Total 6.9 6.8 - 8.8 g/dL    Alkaline Phosphatase 91 40 - 150 U/L    ALT 18 0 - 50 U/L    AST 23 0 - 45 U/L   Hemoglobin   Result Value Ref Range    Hemoglobin 13.7 11.7 - 15.7 g/dL   Albumin Random Urine Quantitative with Creat Ratio   Result Value Ref Range    Creatinine Urine 153 mg/dL    Albumin Urine mg/L 12 mg/L    Albumin Urine mg/g Cr 7.58 0 - 25 mg/g Cr   Hemoglobin A1c   Result Value Ref Range    Hemoglobin A1C 6.7 (H) 0 - 5.6 %         ASSESSMENT / PLAN:   1.  Annual physical exam    - Hemoglobin    2. Type 2 diabetes mellitus without complication, without long-term current use of insulin (H)    - FOOT EXAM  NO CHARGE [94726.114]  - blood glucose monitoring (EMILY CONTOUR NEXT) test strip; Use to test blood sugar 2 times daily or as directed.  Dispense: 100 each; Refill: 3  - blood glucose monitoring (EMILY MICROLET) lancets; Use to test blood sugar 2 times daily or as directed.  Ok to substitute alternative if insurance prefers.  Dispense: 100 each; Refill: 3  - metFORMIN (GLUCOPHAGE-XR) 500 MG 24 hr tablet; Take 1 tablet (500 mg) by mouth daily (with dinner)  Dispense: 90 tablet; Refill: 3  - TSH with free T4 reflex  - Comprehensive metabolic panel  - Albumin Random Urine Quantitative with Creat Ratio  - Hemoglobin A1c    4. Gastroesophageal reflux disease without esophagitis  Controlled well.  Patient would like to resume omeprazole recommended to try using it as needed..  - omeprazole (PRILOSEC) 20 MG DR capsule; Take 1 capsule (20 mg) by mouth daily  Dispense: 90 capsule; Refill: 3    5. Hyperlipidemia LDL goal <100  LDL is higher than normal.  Recommending to increase the dose of simvastatin from 20-40 mg daily.  Follow-up in 3 months for recheck.- Lipid panel reflex to direct LDL Fasting  - Comprehensive metabolic panel    6. Mild single current episode of major depressive disorder (H)  Well-controlled    7. Mild intermittent asthma without complication  Fairly well controlled.  Resume current medications.  - albuterol (PROAIR HFA/PROVENTIL HFA/VENTOLIN HFA) 108 (90 Base) MCG/ACT inhaler; Inhale 2 puffs into the lungs every 4 hours as needed for shortness of breath / dyspnea or wheezing (Patient not taking: Reported on 12/12/2018)  Dispense: 1 Inhaler; Refill: 5  - fluticasone-salmeterol (ADVAIR DISKUS) 500-50 MCG/DOSE inhaler; Inhale 1 puff into the lungs 2 times daily  Dispense: 2 Inhaler; Refill: 0  - montelukast (SINGULAIR) 10 MG tablet; TAKE 1 TABLET(10 MG) BY  "MOUTH AT BEDTIME  Dispense: 90 tablet; Refill: 3    8. Asymptomatic postmenopausal status    - DX Hip/Pelvis/Spine; Future    End of Life Planning:  Patient currently has an advanced directive: Yes.  Practitioner is supportive of decision.    COUNSELING:  Reviewed preventive health counseling, as reflected in patient instructions       Regular exercise       Healthy diet/nutrition    BP Readings from Last 1 Encounters:   12/07/18 110/70     Estimated body mass index is 31.89 kg/m  as calculated from the following:    Height as of 11/6/18: 1.6 m (5' 3\").    Weight as of 12/8/17: 81.6 kg (180 lb).      Weight management plan: Discussed healthy diet and exercise guidelines     reports that she quit smoking about 44 years ago. Her smoking use included cigarettes. She smoked 0.00 packs per day for 6.00 years. she has never used smokeless tobacco.      Appropriate preventive services were discussed with this patient, including applicable screening as appropriate for cardiovascular disease, diabetes, osteopenia/osteoporosis, and glaucoma.  As appropriate for age/gender, discussed screening for colorectal cancer, prostate cancer, breast cancer, and cervical cancer. Checklist reviewing preventive services available has been given to the patient.    Reviewed patients plan of care and provided an AVS. The Intermediate Care Plan ( asthma action plan, low back pain action plan, and migraine action plan) for Claribel meets the Care Plan requirement. This Care Plan has been established and reviewed with the Patient.    Counseling Resources:  ATP IV Guidelines  Pooled Cohorts Equation Calculator  Breast Cancer Risk Calculator  FRAX Risk Assessment  ICSI Preventive Guidelines  Dietary Guidelines for Americans, 2010  USDA's MyPlate  ASA Prophylaxis  Lung CA Screening    Chata Wallace MD  [facility]  "

## 2018-12-10 NOTE — PATIENT INSTRUCTIONS
Preventive Health Recommendations    See your health care provider every year to    Review health changes.     Discuss preventive care.      Review your medicines if your doctor has prescribed any.      You no longer need a yearly Pap test unless you've had an abnormal Pap test in the past 10 years. If you have vaginal symptoms, such as bleeding or discharge, be sure to talk with your provider about a Pap test.      Every 1 to 2 years, have a mammogram.  If you are over 69, talk with your health care provider about whether or not you want to continue having screening mammograms.      Every 10 years, have a colonoscopy. Or, have a yearly FIT test (stool test). These exams will check for colon cancer.       Have a cholesterol test every 5 years, or more often if your doctor advises it.       Have a diabetes test (fasting glucose) every three years. If you are at risk for diabetes, you should have this test more often.       At age 65, have a bone density scan (DEXA) to check for osteoporosis (brittle bone disease).    Shots:    Get a flu shot each year.    Get a tetanus shot every 10 years.    Talk to your doctor about your pneumonia vaccines. There are now two you should receive - Pneumovax (PPSV 23) and Prevnar (PCV 13).    Talk to your pharmacist about the shingles vaccine.    Talk to your doctor about the hepatitis B vaccine.    Nutrition:     Eat at least 5 servings of fruits and vegetables each day.      Eat whole-grain bread, whole-wheat pasta and brown rice instead of white grains and rice.      Get adequate Calcium and Vitamin D.     Lifestyle    Exercise at least 150 minutes a week (30 minutes a day, 5 days a week). This will help you control your weight and prevent disease.      Limit alcohol to one drink per day.      No smoking.       Wear sunscreen to prevent skin cancer.       See your dentist twice a year for an exam and cleaning.      See your eye doctor every 1 to 2 years to screen for conditions  such as glaucoma, macular degeneration and cataracts.    Personalized Prevention Plan  You are due for the preventive services outlined below.  Your care team is available to assist you in scheduling these services.  If you have already completed any of these items, please share that information with your care team to update in your medical record.  Health Maintenance Due   Topic Date Due     Diptheria Tetanus Pertussis (DTAP/TDAP/TD) Vaccine (1 - Tdap) 05/11/1952     Zoster (Chicken Pox) Vaccine (1 of 2) 05/11/1997     Pneumococcal Vaccine (1 of 2 - PCV13) 05/11/2012     Eye Exam - yearly  12/20/2017     A1C (Diabetes) Lab - every 6 months  06/08/2018     Mammogram - every 2 years  07/29/2018     Asthma Action Plan - yearly  08/30/2018     Diabetic Foot Exam - yearly  12/08/2018     Creatinine Lab - yearly  12/08/2018     Cholesterol Lab - yearly  12/08/2018     Microalbumin Lab - yearly  12/08/2018     FALL RISK ASSESSMENT  12/08/2018     Wellness Visit with your Primary Provider - yearly  12/08/2018

## 2018-12-11 DIAGNOSIS — E78.5 HYPERLIPIDEMIA LDL GOAL <100: ICD-10-CM

## 2018-12-11 LAB
ALBUMIN SERPL-MCNC: 3.9 G/DL (ref 3.4–5)
ALP SERPL-CCNC: 91 U/L (ref 40–150)
ALT SERPL W P-5'-P-CCNC: 18 U/L (ref 0–50)
ANION GAP SERPL CALCULATED.3IONS-SCNC: 10 MMOL/L (ref 3–14)
AST SERPL W P-5'-P-CCNC: 23 U/L (ref 0–45)
BILIRUB SERPL-MCNC: 0.8 MG/DL (ref 0.2–1.3)
BUN SERPL-MCNC: 27 MG/DL (ref 7–30)
CALCIUM SERPL-MCNC: 9.6 MG/DL (ref 8.5–10.1)
CHLORIDE SERPL-SCNC: 108 MMOL/L (ref 94–109)
CHOLEST SERPL-MCNC: 180 MG/DL
CO2 SERPL-SCNC: 22 MMOL/L (ref 20–32)
CREAT SERPL-MCNC: 0.68 MG/DL (ref 0.52–1.04)
CREAT UR-MCNC: 153 MG/DL
GFR SERPL CREATININE-BSD FRML MDRD: 85 ML/MIN/1.7M2
GLUCOSE SERPL-MCNC: 117 MG/DL (ref 70–99)
HDLC SERPL-MCNC: 42 MG/DL
LDLC SERPL CALC-MCNC: 109 MG/DL
MICROALBUMIN UR-MCNC: 12 MG/L
MICROALBUMIN/CREAT UR: 7.58 MG/G CR (ref 0–25)
NONHDLC SERPL-MCNC: 138 MG/DL
POTASSIUM SERPL-SCNC: 4 MMOL/L (ref 3.4–5.3)
PROT SERPL-MCNC: 6.9 G/DL (ref 6.8–8.8)
SODIUM SERPL-SCNC: 140 MMOL/L (ref 133–144)
TRIGL SERPL-MCNC: 145 MG/DL
TSH SERPL DL<=0.005 MIU/L-ACNC: 0.88 MU/L (ref 0.4–4)

## 2018-12-11 RX ORDER — SIMVASTATIN 40 MG
40 TABLET ORAL AT BEDTIME
Qty: 90 TABLET | Refills: 1 | Status: SHIPPED | OUTPATIENT
Start: 2018-12-11 | End: 2019-06-23

## 2018-12-11 ASSESSMENT — ASTHMA QUESTIONNAIRES: ACT_TOTALSCORE: 19

## 2018-12-11 ASSESSMENT — ANXIETY QUESTIONNAIRES: GAD7 TOTAL SCORE: 2

## 2018-12-12 ENCOUNTER — TRANSFERRED RECORDS (OUTPATIENT)
Dept: HEALTH INFORMATION MANAGEMENT | Facility: CLINIC | Age: 73
End: 2018-12-12

## 2018-12-12 ENCOUNTER — OFFICE VISIT (OUTPATIENT)
Dept: OBGYN | Facility: CLINIC | Age: 73
End: 2018-12-12
Payer: COMMERCIAL

## 2018-12-12 VITALS
BODY MASS INDEX: 32.58 KG/M2 | HEART RATE: 70 BPM | SYSTOLIC BLOOD PRESSURE: 124 MMHG | DIASTOLIC BLOOD PRESSURE: 56 MMHG | HEIGHT: 63 IN

## 2018-12-12 DIAGNOSIS — N95.2 VAGINAL ATROPHY: ICD-10-CM

## 2018-12-12 DIAGNOSIS — Z01.419 ENCOUNTER FOR GYNECOLOGICAL EXAMINATION WITHOUT ABNORMAL FINDING: Primary | ICD-10-CM

## 2018-12-12 DIAGNOSIS — M85.851 OSTEOPENIA OF RIGHT HIP: ICD-10-CM

## 2018-12-12 PROCEDURE — 99213 OFFICE O/P EST LOW 20 MIN: CPT | Performed by: OBSTETRICS & GYNECOLOGY

## 2018-12-12 ASSESSMENT — ANXIETY QUESTIONNAIRES
6. BECOMING EASILY ANNOYED OR IRRITABLE: SEVERAL DAYS
IF YOU CHECKED OFF ANY PROBLEMS ON THIS QUESTIONNAIRE, HOW DIFFICULT HAVE THESE PROBLEMS MADE IT FOR YOU TO DO YOUR WORK, TAKE CARE OF THINGS AT HOME, OR GET ALONG WITH OTHER PEOPLE: NOT DIFFICULT AT ALL
7. FEELING AFRAID AS IF SOMETHING AWFUL MIGHT HAPPEN: NOT AT ALL
5. BEING SO RESTLESS THAT IT IS HARD TO SIT STILL: NOT AT ALL
3. WORRYING TOO MUCH ABOUT DIFFERENT THINGS: NOT AT ALL
GAD7 TOTAL SCORE: 2
1. FEELING NERVOUS, ANXIOUS, OR ON EDGE: SEVERAL DAYS
2. NOT BEING ABLE TO STOP OR CONTROL WORRYING: NOT AT ALL

## 2018-12-12 ASSESSMENT — PATIENT HEALTH QUESTIONNAIRE - PHQ9
5. POOR APPETITE OR OVEREATING: NOT AT ALL
SUM OF ALL RESPONSES TO PHQ QUESTIONS 1-9: 1

## 2018-12-12 NOTE — PROGRESS NOTES
Claribel is a 73 year old  female who presents for Limited exam.     Do you have a Health Care Directive?: No: Advance care planning was reviewed with patient; patient declined at this time.    Fall risk:   Fallen 2 or more times in the past year?: No  Any fall with injury in the past year?: No    HPI :  Here today for GYN exam --has not been seen in our office for annual exam for a few years.  Was seen last by us in 2016 for left breast concerns.  Has been seeing Dr. Wallace for primary cares.  Postmenopausal.  S/p ROXANNA/BSO in  with Dr. Rosas.  No vb/spotting.  Occ night sweats but not an issue.  Nothing during the day time.  Not currently SA.  Denies vaginal dryness or pain.  No bladder concerns.  No leaking or urgency issues.  Getting up rarely at night to void.  Does struggle with GI issues --hx of IBS-diarrhea which has been acting up.  Dr. Wallace has recently recommended re-visiting GI to discuss.      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  -staying active with work but no formal exercise; has older dog who isn't a walker  +mammo already today at [facility] [place]; +SBE on occ --no concerns  PCP -Dr. Wallace; just had fasting bloodwork done this week; sees regularily and as needed  -has had 2 knee surgeries with Dr. Gomez since our last visit; had replacement and then repeat surgery for scar tissue removal; doing better functionally --still has some pain/discomfort  -hx osteopenia; last bone scan done at [facility] [place] in 2016; would like to repeat with us this year    GYNECOLOGIC HISTORY:  No LMP recorded. Patient has had a hysterectomy..   reports that she quit smoking about 44 years ago. Her smoking use included cigarettes. She smoked 0.00 packs per day for 6.00 years. she has never used smokeless tobacco.    STD testing offered?   Declined  Last PHQ-9 score on record=   PHQ-9 SCORE 2018   PHQ-9 Total Score -   PHQ-9 Total Score 1     Last GAD7 score on record=   TRINA-7 SCORE 2018 12/10/2018 2018   Total Score - - -   Total Score 4 2 2       HEALTH MAINTENANCE:  Cholesterol: 17   Total= 169, Triglycerides=90, HDL=61, LDL=90, RKI=875 (12/10/18), TSH=0.88 (12/10/18)   Last Mammo: 2 years ago, Result: normal, Next Mammo: @ [facility] today   Pap: None further due to age.   DEXA:  21 mild osteopenia and mild scoliosis  Colonoscopy:  05/10/16, Result:  diverticulosis, Next Colonoscopy: 8 years.    HISTORY:  Obstetric History       T0      L0     SAB0   TAB0   Ectopic0   Multiple0   Live Births0         Past Medical History:   Diagnosis Date     Acquired absence of organ, genital organs      Depressive disorder, not elsewhere classified      Diabetes (H)      Fibromyalgia      Gastroesophageal reflux disease      History of hormone replacement therapy      Hx musculoskletl dis NEC      Mastodynia      Noninfectious ileitis     IBS     Obesity      Osteoarthrosis, unspecified whether generalized or localized, hand      Other chronic pain      Other extrapyramidal disease and abnormal movement disorder      Other nonspecific findings on examination of blood(790.99)      Pelvic kidney      Personal history of unspecified urinary disorder      Uncomplicated asthma      Unspecified sleep apnea     CPAP used     Past Surgical History:   Procedure Laterality Date     APPENDECTOMY       ARTHROPLASTY KNEE Right 2016    Procedure: ARTHROPLASTY KNEE;  Surgeon: Jose Alberto Gomez MD;  Location: [facility] OR     CARPAL TUNNEL RELEASE RT/LT  ,     right 2002, left 2002     COLONOSCOPY  2009    Normal; repeat in 10 years     COLONOSCOPY N/A 2015    Procedure: COLONOSCOPY;  Surgeon: Cathie Melendez MD;  Location:  GI     EXCHANGE POLY COMPONENT ARTHROPLASTY KNEE Right 2017    Procedure: EXCHANGE  POLY COMPONENT ARTHROPLASTY KNEE;  1.  Right knee exploration and limited scar tissue excision.   2.  Tibial polyethylene insert exchange (after removal of the original polyethylene component and following the posterior cruciate ligament resection, a deep dish polyethylene #3 of 9 mm thickness was placed)     ;  Surgeon: Jose Alberto Gomez MD;  Location: [facility] OR     HYSTERECTOMY TOTAL ABDOMINAL, BILATERAL SALPINGO-OOPHORECTOMY, COMBINED  11/18/1992    Fibroids, endometriosis, Dr Rosas     JOINT REPLACEMENT  4/2010    bilateral thumb     MANOMETRY (ESOPHAGEAL)  10/2007    Normal     PUNC/ASPIR BREAST CYST  11/2004     TONSILLECTOMY  1950     Family History   Problem Relation Age of Onset     Hypertension Father      Blood Disease Father         DVT's     Eye Disorder Father      Colon Cancer Father      Colon Cancer Paternal Uncle      Asthma Sister      C.A.D. Paternal Grandfather      Diabetes Maternal Aunt      Colon Cancer Maternal Uncle      Hyperlipidemia Paternal Grandmother      Hypertension Paternal Grandmother      Hyperlipidemia Other         Aunt     Hypertension Maternal Grandfather      Hypertension Other         Aunt     Breast Cancer No family hx of      Anesthesia Reaction No family hx of      Osteoporosis No family hx of      Thyroid Disease No family hx of      Social History     Socioeconomic History     Marital status:      Spouse name: Not on file     Number of children: 0     Years of education: 18     Highest education level: Not on file   Social Needs     Financial resource strain: Not on file     Food insecurity - worry: Not on file     Food insecurity - inability: Not on file     Transportation needs - medical: Not on file     Transportation needs - non-medical: Not on file   Occupational History     Occupation: Psychologist     Employer: RETIRED     Comment: worked for [employer]   Tobacco Use     Smoking status: Former Smoker     Packs/day: 0.00     Years: 6.00      Pack years: 0.00     Types: Cigarettes     Last attempt to quit: 1974     Years since quittin.5     Smokeless tobacco: Never Used     Tobacco comment: Previous smoker, 1-1.5 packs per week   Substance and Sexual Activity     Alcohol use: Yes     Alcohol/week: 2.4 - 3.6 oz     Types: 4 - 6 Standard drinks or equivalent per week     Comment: 1 drnk monthly     Drug use: No     Sexual activity: Not Currently     Birth control/protection: Post-menopausal   Other Topics Concern      Service Not Asked     Blood Transfusions No     Caffeine Concern Not Asked     Occupational Exposure Not Asked     Hobby Hazards Not Asked     Sleep Concern Yes     Comment: has sleep apnea     Stress Concern Not Asked     Weight Concern Not Asked     Special Diet No     Back Care No     Exercise Yes     Comment: Some     Bike Helmet Not Asked     Comment: Does not ride     Seat Belt Yes     Self-Exams Not Asked     Parent/sibling w/ CABG, MI or angioplasty before 65F 55M? Not Asked   Social History Narrative    Eats fruits and vegetables every day. Calcium and vitamin D supplements recommended.      Current Outpatient Medications   Medication Sig     blood glucose monitoring (EMILY CONTOUR NEXT) test strip Use to test blood sugar 2 times daily or as directed.     blood glucose monitoring (EMILY MICROLET) lancets Use to test blood sugar 2 times daily or as directed.  Ok to substitute alternative if insurance prefers.     Blood Glucose Monitoring Suppl (CONTOUR NEXT EZ MONITOR) W/DEVICE KIT 1 each daily as needed     Cholecalciferol (VITAMIN D3 PO) Take 1,000 Units by mouth daily      escitalopram (LEXAPRO) 20 MG tablet Take 1 tablet (20 mg) by mouth daily     fluticasone-salmeterol (ADVAIR DISKUS) 500-50 MCG/DOSE inhaler Inhale 1 puff into the lungs 2 times daily     MELATONIN PO Take 5 mg by mouth     metFORMIN (GLUCOPHAGE-XR) 500 MG 24 hr tablet Take 1 tablet (500 mg) by mouth daily (with dinner)     montelukast (SINGULAIR)  "10 MG tablet TAKE 1 TABLET(10 MG) BY MOUTH AT BEDTIME     omeprazole (PRILOSEC) 20 MG DR capsule Take 1 capsule (20 mg) by mouth daily     simvastatin (ZOCOR) 40 MG tablet Take 1 tablet (40 mg) by mouth At Bedtime     albuterol (PROAIR HFA/PROVENTIL HFA/VENTOLIN HFA) 108 (90 Base) MCG/ACT inhaler Inhale 2 puffs into the lungs every 4 hours as needed for shortness of breath / dyspnea or wheezing (Patient not taking: Reported on 12/12/2018)     fexofenadine-pseudoePHEDrine (ALLEGRA-D)  MG per tablet Take 1 tablet by mouth 2 times daily as needed      zolpidem (AMBIEN) 10 MG tablet 1 TABLET AT BEDTIME AS NEEDED (Patient not taking: Reported on 12/12/2018)     No current facility-administered medications for this visit.      Allergies   Allergen Reactions     Ivp Dye [Contrast Dye] Shortness Of Breath     Claritin [Loratadine]      Hallucinations         Past medical, surgical, social and family history were reviewed and updated in EPIC.    EXAM:  /56   Pulse 70   Ht 1.588 m (5' 2.5\")   BMI 32.58 kg/m     BMI: Body mass index is 32.58 kg/m .    Constitutional: Appearance: Well nourished, well developed alert, in no acute distress  Breasts: Inspection of Breasts:  No lymphadenopathy present    Palpation of Breasts and Axillae:  No masses present on palpation, no  breast tenderness    Axillary Lymph Nodes:  No lymphadenopathy present  Neurologic/Psychiatric:    Mental Status:  Oriented X3     Pelvic Exam:  External Genitalia:     Normal appearance for age, no discharge present, no tenderness present, no inflammatory lesions present, color normal  Vagina:     Normal vaginal vault without central or paravaginal defects, no discharge present, no inflammatory lesions present, no masses present; atrophic  Bladder:     Nontender to palpation  Urethra:   Urethral Body:  Urethra palpation normal, urethra structural support normal   Urethral Meatus:  No erythema or lesions present  Cervix:     Surgically " absent  Uterus:     Surgically absent  Adnexa:     Surgically absent  Perineum:     Perineum within normal limits, no evidence of trauma, no rashes or skin lesions present  Anus:     Anus within normal limits, no hemorrhoids present  Inguinal Lymph Nodes:     No lymphadenopathy present    Body mass index is 32.58 kg/m .  Weight management plan: Patient was referred to their PCP to discuss a diet and exercise plan.   reports that she quit smoking about 44 years ago. Her smoking use included cigarettes. She smoked 0.00 packs per day for 6.00 years. she has never used smokeless tobacco.      ASSESSMENT:  73 year old female with satisfactory annual exam.    ICD-10-CM    1. Encounter for gynecological examination without abnormal finding Z01.419 Medicare Limited Visit       COUNSELING:   Reviewed preventive health counseling, as reflected in patient instructions    PLAN/PATIENT INSTRUCTIONS:    Patient Instructions   Follow up with your primary care provider (Dr. Wallace) for your other medical problems.  Continue self breast exam.  Increase physical activity and exercise.  Usual safety and preventative measures counseling done.  BMI >25  Weight loss encouraged.  Last pap smear (2009) was normal.  No pap was obtained this year due to patient age per guidelines.  This was discussed with the patient and she agrees with the plan.  Discussed Osteoporosis screening as well as calcium and Vitamin D recommendations.  Will repeat bone scan this year and would like to arrange in our office.      Crystal Watts MD

## 2018-12-12 NOTE — PATIENT INSTRUCTIONS
Follow up with your primary care provider (Dr. Wallace) for your other medical problems.  Continue self breast exam.  Increase physical activity and exercise.  Usual safety and preventative measures counseling done.  BMI >25  Weight loss encouraged.  Last pap smear (2009) was normal.  No pap was obtained this year due to patient age per guidelines.  This was discussed with the patient and she agrees with the plan.  Discussed Osteoporosis screening as well as calcium and Vitamin D recommendations.  Will repeat bone scan this year and would like to arrange in our office.

## 2018-12-13 ASSESSMENT — ANXIETY QUESTIONNAIRES: GAD7 TOTAL SCORE: 2

## 2018-12-17 ENCOUNTER — ANCILLARY PROCEDURE (OUTPATIENT)
Dept: BONE DENSITY | Facility: CLINIC | Age: 73
End: 2018-12-17
Attending: OBSTETRICS & GYNECOLOGY
Payer: COMMERCIAL

## 2018-12-17 DIAGNOSIS — Z78.0 ASYMPTOMATIC POSTMENOPAUSAL STATE: ICD-10-CM

## 2018-12-17 DIAGNOSIS — M85.851 OSTEOPENIA OF RIGHT HIP: ICD-10-CM

## 2018-12-17 PROCEDURE — 77080 DXA BONE DENSITY AXIAL: CPT | Performed by: OBSTETRICS & GYNECOLOGY

## 2018-12-17 NOTE — LETTER
[address] [address]  Phone: [phone]  Fax: [phone]      Claribel Del Rio     Date:  12/18/2018   [address] [place] [address]      Thank you for having your DEXA scan performed.  As you recall, the DEXA scan evaluates the strength of your bones.  It is important to know the strength of your bones to help you avoid breaking bones in the future.    A T score shows your risk for breaking a bone.  Normal bone has a T score of -0.9 or higher.  Some bone thinning (osteopenia) has a T score between -1.0 and -2.4.  A lot of bone thinning (osteoporosis) has a T score of -2.5 or lower.    Your T scores on 12/17/2018 were:     Left Hip:  -2.1 score    Right Hip:  -2.0 score    This T score shows you have some thinning (Osteopenia) Compared to your previous scan, this shows more bone loss on the left and stable osteopenia on the right.    Spine:  -0.4 score      This T score shows you have normal bone Compared with your previous scan, this shows no major change.      The good news is that there are treatments for making your bones stronger, if you need them.    Ways you can make your bones stronger:    Weight bearing exercises such as walking.  Eat three servings of dairy a day, or take calcium each day (2720-9985 mg).  Take Vitamin D each day (1,000-2,000 IU).    For more information, please make an appointment with your Primary Care Provider, request a brochure from our office, or look on our website and go to the Bone Density page.     No need to make an appointment.    Claribel, continue Vitamin D, Calcium and exercise.    We will continue to follow the thinning of your hip bones every 2-3 years.  Keep up the good work!    Thank you.    Crystal Watts MD

## 2019-03-05 DIAGNOSIS — J45.20 MILD INTERMITTENT ASTHMA WITHOUT COMPLICATION: ICD-10-CM

## 2019-03-05 NOTE — TELEPHONE ENCOUNTER
"Last Written Prescription Date:  12/10/18  Last Fill Quantity: 2,  # refills: 0   Last office visit: 12/10/2018 with prescribing provider:     Future Office Visit:    Requested Prescriptions   Pending Prescriptions Disp Refills     ADVAIR DISKUS 500-50 MCG/DOSE inhaler [Pharmacy Med Name: ADVAIR 500-50 DISKUS]  0     Sig: TAKE 1 PUFF BY MOUTH TWICE A DAY    Inhaled Steroids Protocol Failed - 3/5/2019  1:11 AM       Failed - Asthma control assessment score within normal limits in last 6 months    Please review ACT score.          Passed - Patient is age 12 or older       Passed - Medication is active on med list       Passed - Recent (6 mo) or future (30 days) visit within the authorizing provider's specialty    Patient had office visit in the last 6 months or has a visit in the next 30 days with authorizing provider or within the authorizing provider's specialty.  See \"Patient Info\" tab in inbasket, or \"Choose Columns\" in Meds & Orders section of the refill encounter.              "

## 2019-03-05 NOTE — TELEPHONE ENCOUNTER
Prescription approved per [facility] [facility].    Rosalia LIM RN  EP Triage

## 2019-05-09 ENCOUNTER — TELEPHONE (OUTPATIENT)
Dept: FAMILY MEDICINE | Facility: CLINIC | Age: 74
End: 2019-05-09

## 2019-05-09 ASSESSMENT — PATIENT HEALTH QUESTIONNAIRE - PHQ9: SUM OF ALL RESPONSES TO PHQ QUESTIONS 1-9: 3

## 2019-05-09 NOTE — TELEPHONE ENCOUNTER
Called patient to update phq9.  Patient stated she is currently seeing psychiatry and taking escitalopram that is prescribed by psychiatry.    LANCE FragosoN, RN  [employer] Triage

## 2019-05-09 NOTE — TELEPHONE ENCOUNTER
Pt is due now to update PHQ9.  Please call pt and update. Follow up end date 7/5/19.   PHQ-9 SCORE 11/6/2018 12/10/2018 12/12/2018   PHQ-9 Total Score - - -   PHQ-9 Total Score 13 3 1     Rich HAYES, MARIO

## 2019-06-23 DIAGNOSIS — E78.5 HYPERLIPIDEMIA LDL GOAL <100: ICD-10-CM

## 2019-06-24 NOTE — TELEPHONE ENCOUNTER
"Requested Prescriptions   Pending Prescriptions Disp Refills     simvastatin (ZOCOR) 40 MG tablet [Pharmacy Med Name: SIMVASTATIN 40 MG TABLET] 90 tablet 1     Sig: TAKE 1 TABLET BY MOUTH AT BEDTIME  Last Written Prescription Date:  12/11/18  Last Fill Quantity: 90,  # refills: 1   Last office visit: 12/10/2018 with prescribing provider:  Rodrigo   Future Office Visit:           Statins Protocol Passed - 6/23/2019  1:33 PM        Passed - LDL on file in past 12 months     Recent Labs   Lab Test 12/10/18  1146   *             Passed - No abnormal creatine kinase in past 12 months     Recent Labs   Lab Test 10/04/11  1155   CKT 74                Passed - Recent (12 mo) or future (30 days) visit within the authorizing provider's specialty     Patient had office visit in the last 12 months or has a visit in the next 30 days with authorizing provider or within the authorizing provider's specialty.  See \"Patient Info\" tab in inbasket, or \"Choose Columns\" in Meds & Orders section of the refill encounter.              Passed - Medication is active on med list        Passed - Patient is age 18 or older        Passed - No active pregnancy on record        Passed - No positive pregnancy test in past 12 months          "

## 2019-06-25 RX ORDER — SIMVASTATIN 40 MG
TABLET ORAL
Qty: 90 TABLET | Refills: 1 | Status: SHIPPED | OUTPATIENT
Start: 2019-06-25 | End: 2019-12-12

## 2019-10-28 ENCOUNTER — OFFICE VISIT (OUTPATIENT)
Dept: FAMILY MEDICINE | Facility: CLINIC | Age: 74
End: 2019-10-28
Payer: MEDICARE

## 2019-10-28 VITALS
HEART RATE: 88 BPM | BODY MASS INDEX: 32.07 KG/M2 | OXYGEN SATURATION: 95 % | HEIGHT: 63 IN | RESPIRATION RATE: 14 BRPM | WEIGHT: 181 LBS | SYSTOLIC BLOOD PRESSURE: 132 MMHG | DIASTOLIC BLOOD PRESSURE: 74 MMHG | TEMPERATURE: 97.7 F

## 2019-10-28 DIAGNOSIS — A09 DIARRHEA OF INFECTIOUS ORIGIN: Primary | ICD-10-CM

## 2019-10-28 DIAGNOSIS — F32.0 MILD SINGLE CURRENT EPISODE OF MAJOR DEPRESSIVE DISORDER (H): ICD-10-CM

## 2019-10-28 DIAGNOSIS — Z23 NEED FOR PROPHYLACTIC VACCINATION AND INOCULATION AGAINST INFLUENZA: ICD-10-CM

## 2019-10-28 DIAGNOSIS — E11.9 TYPE 2 DIABETES MELLITUS WITHOUT COMPLICATION, WITHOUT LONG-TERM CURRENT USE OF INSULIN (H): ICD-10-CM

## 2019-10-28 LAB
BASOPHILS # BLD AUTO: 0 10E9/L (ref 0–0.2)
BASOPHILS NFR BLD AUTO: 0.5 %
DIFFERENTIAL METHOD BLD: NORMAL
EOSINOPHIL # BLD AUTO: 0.1 10E9/L (ref 0–0.7)
EOSINOPHIL NFR BLD AUTO: 1.4 %
ERYTHROCYTE [DISTWIDTH] IN BLOOD BY AUTOMATED COUNT: 13.2 % (ref 10–15)
HBA1C MFR BLD: 11.4 % (ref 0–5.6)
HCT VFR BLD AUTO: 43.8 % (ref 35–47)
HGB BLD-MCNC: 14.4 G/DL (ref 11.7–15.7)
LYMPHOCYTES # BLD AUTO: 1.5 10E9/L (ref 0.8–5.3)
LYMPHOCYTES NFR BLD AUTO: 23.1 %
MCH RBC QN AUTO: 29 PG (ref 26.5–33)
MCHC RBC AUTO-ENTMCNC: 32.9 G/DL (ref 31.5–36.5)
MCV RBC AUTO: 88 FL (ref 78–100)
MONOCYTES # BLD AUTO: 0.5 10E9/L (ref 0–1.3)
MONOCYTES NFR BLD AUTO: 7.1 %
NEUTROPHILS # BLD AUTO: 4.3 10E9/L (ref 1.6–8.3)
NEUTROPHILS NFR BLD AUTO: 67.9 %
PLATELET # BLD AUTO: 263 10E9/L (ref 150–450)
RBC # BLD AUTO: 4.97 10E12/L (ref 3.8–5.2)
WBC # BLD AUTO: 6.3 10E9/L (ref 4–11)

## 2019-10-28 PROCEDURE — 83036 HEMOGLOBIN GLYCOSYLATED A1C: CPT | Performed by: PHYSICIAN ASSISTANT

## 2019-10-28 PROCEDURE — 85025 COMPLETE CBC W/AUTO DIFF WBC: CPT | Performed by: PHYSICIAN ASSISTANT

## 2019-10-28 PROCEDURE — G0008 ADMIN INFLUENZA VIRUS VAC: HCPCS | Performed by: PHYSICIAN ASSISTANT

## 2019-10-28 PROCEDURE — 99214 OFFICE O/P EST MOD 30 MIN: CPT | Mod: 25 | Performed by: PHYSICIAN ASSISTANT

## 2019-10-28 PROCEDURE — 90662 IIV NO PRSV INCREASED AG IM: CPT | Performed by: PHYSICIAN ASSISTANT

## 2019-10-28 PROCEDURE — 80053 COMPREHEN METABOLIC PANEL: CPT | Performed by: PHYSICIAN ASSISTANT

## 2019-10-28 PROCEDURE — 36415 COLL VENOUS BLD VENIPUNCTURE: CPT | Performed by: PHYSICIAN ASSISTANT

## 2019-10-28 ASSESSMENT — MIFFLIN-ST. JEOR: SCORE: 1292.2

## 2019-10-28 NOTE — PROGRESS NOTES
Subjective     Claribel Del Rio is a 74 year old female who presents to clinic today for the following health issues:    HPI   Diarrhea      Duration: 2 weeks    Description:       Consistency of stool: watery, runny and loose       Blood in stool: no        Number of loose stools past 24 hours: none    Intensity:  mild    Accompanying signs and symptoms:       Fever: no        Nausea/vomitting: no        Abdominal pain: no        Weight loss: no     History (recent antibiotics or travel/ill contacts/med changes/testing done): Recently traveled to [place] when symptoms began.    Precipitating or alleviating factors: None    Therapies tried and outcome: Imodium right ear and probiotic        Patient Active Problem List   Diagnosis     Obesity     Fibromyalgia     Advanced directives, counseling/discussion     Allergic rhinitis     Low back pain     MIRZA (obstructive sleep apnea)- on CPAP     Mild intermittent asthma without complication     Status post total right knee replacement     Type 2 diabetes mellitus without complication (H)     Hyperlipidemia LDL goal <100     Mild single current episode of major depressive disorder (H)     Osteopenia     Mild intermittent asthma, unspecified whether complicated     Past Surgical History:   Procedure Laterality Date     APPENDECTOMY  1992     ARTHROPLASTY KNEE Right 6/7/2016    Procedure: ARTHROPLASTY KNEE;  Surgeon: Jose Alberto Gomez MD;  Location: [facility] OR     CARPAL TUNNEL RELEASE RT/LT  05/02, 08/02    right 5/2002, left 8/2002     COLONOSCOPY  4/2009    Normal; repeat in 10 years     COLONOSCOPY N/A 11/4/2015    Procedure: COLONOSCOPY;  Surgeon: Cathie Melendez MD;  Location:  GI     EXCHANGE POLY COMPONENT ARTHROPLASTY KNEE Right 9/5/2017    Procedure: EXCHANGE POLY COMPONENT ARTHROPLASTY KNEE;  1.  Right knee exploration and limited scar tissue excision.   2.  Tibial polyethylene insert exchange (after removal of the original polyethylene component and following  the posterior cruciate ligament resection, a deep dish polyethylene #3 of 9 mm thickness was placed)     ;  Surgeon: Jose Alberto Gomez MD;  Location: [facility] OR     HYSTERECTOMY TOTAL ABDOMINAL, BILATERAL SALPINGO-OOPHORECTOMY, COMBINED  1992    Fibroids, endometriosis, Dr Rosas     JOINT REPLACEMENT  2010    bilateral thumb     MANOMETRY (ESOPHAGEAL)  10/2007    Normal     PUNC/ASPIR BREAST CYST  2004     TONSILLECTOMY  1950       Social History     Tobacco Use     Smoking status: Former Smoker     Packs/day: 0.00     Years: 6.00     Pack years: 0.00     Types: Cigarettes     Last attempt to quit: 1974     Years since quittin.4     Smokeless tobacco: Never Used     Tobacco comment: Previous smoker, 1-1.5 packs per week   Substance Use Topics     Alcohol use: Yes     Alcohol/week: 4.0 - 6.0 standard drinks     Types: 4 - 6 Standard drinks or equivalent per week     Comment: 1 sp monthly     Family History   Problem Relation Age of Onset     Hypertension Father      Blood Disease Father         DVT's     Eye Disorder Father      Colon Cancer Father      Colon Cancer Paternal Uncle      Asthma Sister      C.A.D. Paternal Grandfather      Diabetes Maternal Aunt      Colon Cancer Maternal Uncle      Hyperlipidemia Paternal Grandmother      Hypertension Paternal Grandmother      Hyperlipidemia Other         Aunt     Hypertension Maternal Grandfather      Hypertension Other         Aunt     Breast Cancer No family hx of      Anesthesia Reaction No family hx of      Osteoporosis No family hx of      Thyroid Disease No family hx of          Current Outpatient Medications   Medication Sig Dispense Refill     ADVAIR DISKUS 500-50 MCG/DOSE inhaler TAKE 1 PUFF BY MOUTH TWICE A DAY 3 Inhaler 1     albuterol (PROAIR HFA/PROVENTIL HFA/VENTOLIN HFA) 108 (90 Base) MCG/ACT inhaler Inhale 2 puffs into the lungs every 4 hours as needed for shortness of breath / dyspnea or wheezing 1 Inhaler 5     blood glucose  "monitoring (EMILY CONTOUR NEXT) test strip Use to test blood sugar 2 times daily or as directed. 100 each 3     blood glucose monitoring (EMILY MICROLET) lancets Use to test blood sugar 2 times daily or as directed.  Ok to substitute alternative if insurance prefers. 100 each 3     Blood Glucose Monitoring Suppl (CONTOUR NEXT EZ MONITOR) W/DEVICE KIT 1 each daily as needed 1 kit 0     Cholecalciferol (VITAMIN D3 PO) Take 1,000 Units by mouth daily        escitalopram (LEXAPRO) 20 MG tablet Take 1 tablet (20 mg) by mouth daily 30 tablet 1     fexofenadine-pseudoePHEDrine (ALLEGRA-D)  MG per tablet Take 1 tablet by mouth 2 times daily as needed        MELATONIN PO Take 5 mg by mouth       montelukast (SINGULAIR) 10 MG tablet TAKE 1 TABLET(10 MG) BY MOUTH AT BEDTIME 90 tablet 3     omeprazole (PRILOSEC) 20 MG DR capsule Take 1 capsule (20 mg) by mouth daily 90 capsule 3     simvastatin (ZOCOR) 40 MG tablet TAKE 1 TABLET BY MOUTH AT BEDTIME 90 tablet 1     zolpidem (AMBIEN) 10 MG tablet 1 TABLET AT BEDTIME AS NEEDED 30 tablet 0     metFORMIN (GLUCOPHAGE-XR) 500 MG 24 hr tablet Take 2 tablets (1,000 mg) by mouth daily (with dinner) 180 tablet 0     Allergies   Allergen Reactions     Ivp Dye [Contrast Dye] Shortness Of Breath     Claritin [Loratadine]      Hallucinations           Reviewed and updated as needed this visit by Provider         Review of Systems   ROS COMP: Constitutional, HEENT, cardiovascular, pulmonary, GI, , musculoskeletal, neuro, skin, endocrine and psych systems are negative, except as otherwise noted.      Objective    /74   Pulse 88   Temp 97.7  F (36.5  C) (Tympanic)   Resp 14   Ht 1.588 m (5' 2.5\")   Wt 82.1 kg (181 lb)   SpO2 95%   BMI 32.58 kg/m    Body mass index is 32.58 kg/m .  Physical Exam   GENERAL: healthy, alert and no distress  RESP: lungs clear to auscultation - no rales, rhonchi or wheezes  CV: regular rate and rhythm, normal S1 S2, no S3 or S4, no murmur, click " "or rub  ABDOMEN: soft, nontender, no hepatosplenomegaly, no masses and bowel sounds normal  PSYCH: mentation appears normal, affect normal/bright    Diagnostic Test Results:  Labs reviewed in Epic        Assessment & Plan     1. Diarrhea of infectious origin  Symptoms resolving, advise that if these persist, she complete a stool culture.  Will check labs today to assess fluid status   - CBC with platelets differential  - Comprehensive metabolic panel (BMP + Alb, Alk Phos, ALT, AST, Total. Bili, TP)  - Enteric Bacteria and Virus Panel by [facility] Stool; Future    2. Mild single current episode of major depressive disorder (H)    3. Type 2 diabetes mellitus without complication, without long-term current use of insulin (H)  - Hemoglobin A1c    4. Need for prophylactic vaccination and inoculation against influenza  - INFLUENZA (HIGH DOSE) 3 VALENT VACCINE [50338]  - Vaccine Administration, Initial [82583]     BMI:   Estimated body mass index is 32.58 kg/m  as calculated from the following:    Height as of this encounter: 1.588 m (5' 2.5\").    Weight as of this encounter: 82.1 kg (181 lb).         Follow up as needed    No follow-ups on file.    Se Galvez PA-C  [facility] [place]      "

## 2019-10-29 ENCOUNTER — TELEPHONE (OUTPATIENT)
Dept: FAMILY MEDICINE | Facility: CLINIC | Age: 74
End: 2019-10-29

## 2019-10-29 LAB
ALBUMIN SERPL-MCNC: 3.7 G/DL (ref 3.4–5)
ALP SERPL-CCNC: 142 U/L (ref 40–150)
ALT SERPL W P-5'-P-CCNC: 28 U/L (ref 0–50)
ANION GAP SERPL CALCULATED.3IONS-SCNC: 9 MMOL/L (ref 3–14)
AST SERPL W P-5'-P-CCNC: 15 U/L (ref 0–45)
BILIRUB SERPL-MCNC: 0.7 MG/DL (ref 0.2–1.3)
BUN SERPL-MCNC: 21 MG/DL (ref 7–30)
CALCIUM SERPL-MCNC: 8.8 MG/DL (ref 8.5–10.1)
CHLORIDE SERPL-SCNC: 102 MMOL/L (ref 94–109)
CO2 SERPL-SCNC: 25 MMOL/L (ref 20–32)
CREAT SERPL-MCNC: 0.66 MG/DL (ref 0.52–1.04)
GFR SERPL CREATININE-BSD FRML MDRD: 86 ML/MIN/{1.73_M2}
GLUCOSE SERPL-MCNC: 437 MG/DL (ref 70–99)
POTASSIUM SERPL-SCNC: 4.3 MMOL/L (ref 3.4–5.3)
PROT SERPL-MCNC: 7.2 G/DL (ref 6.8–8.8)
SODIUM SERPL-SCNC: 136 MMOL/L (ref 133–144)

## 2019-10-29 ASSESSMENT — ASTHMA QUESTIONNAIRES: ACT_TOTALSCORE: 23

## 2019-10-30 NOTE — TELEPHONE ENCOUNTER
Diarrhea is continuing to resolve. Loose stool once a day.   Only other symptom is fatigue.   Patient scheduled follow up appointment with Dr. Wallace for 11/1. Anahi Calzada RN

## 2019-10-30 NOTE — TELEPHONE ENCOUNTER
Non detailed message left for pt to return call to clinic and ask to speak with a triage nurse.    Rosalia LIM RN  EP Triage

## 2019-10-30 NOTE — TELEPHONE ENCOUNTER
Please call Claribel with the results of her labs. Her labs do not show evidence of infection or dehydration but her A1C is quite elevated ( 11) and her blood sugars are very high ( 437). Please ask how her diarrhea has been and whether this has continued to resolve.Is she having any other symptoms at this time? I would advise that she make an appointment with me or her PCP ( Dr. Wallace) in the next 2 weeks to discuss better DM control    Results for orders placed or performed in visit on 10/28/19   CBC with platelets differential   Result Value Ref Range    WBC 6.3 4.0 - 11.0 10e9/L    RBC Count 4.97 3.8 - 5.2 10e12/L    Hemoglobin 14.4 11.7 - 15.7 g/dL    Hematocrit 43.8 35.0 - 47.0 %    MCV 88 78 - 100 fl    MCH 29.0 26.5 - 33.0 pg    MCHC 32.9 31.5 - 36.5 g/dL    RDW 13.2 10.0 - 15.0 %    Platelet Count 263 150 - 450 10e9/L    % Neutrophils 67.9 %    % Lymphocytes 23.1 %    % Monocytes 7.1 %    % Eosinophils 1.4 %    % Basophils 0.5 %    Absolute Neutrophil 4.3 1.6 - 8.3 10e9/L    Absolute Lymphocytes 1.5 0.8 - 5.3 10e9/L    Absolute Monocytes 0.5 0.0 - 1.3 10e9/L    Absolute Eosinophils 0.1 0.0 - 0.7 10e9/L    Absolute Basophils 0.0 0.0 - 0.2 10e9/L    Diff Method Automated Method    Comprehensive metabolic panel (BMP + Alb, Alk Phos, ALT, AST, Total. Bili, TP)   Result Value Ref Range    Sodium 136 133 - 144 mmol/L    Potassium 4.3 3.4 - 5.3 mmol/L    Chloride 102 94 - 109 mmol/L    Carbon Dioxide 25 20 - 32 mmol/L    Anion Gap 9 3 - 14 mmol/L    Glucose 437 (H) 70 - 99 mg/dL    Urea Nitrogen 21 7 - 30 mg/dL    Creatinine 0.66 0.52 - 1.04 mg/dL    GFR Estimate 86 >60 mL/min/[1.73_m2]    GFR Estimate If Black 100 >60 mL/min/[1.73_m2]    Calcium 8.8 8.5 - 10.1 mg/dL    Bilirubin Total 0.7 0.2 - 1.3 mg/dL    Albumin 3.7 3.4 - 5.0 g/dL    Protein Total 7.2 6.8 - 8.8 g/dL    Alkaline Phosphatase 142 40 - 150 U/L    ALT 28 0 - 50 U/L    AST 15 0 - 45 U/L   Hemoglobin A1c   Result Value Ref Range    Hemoglobin A1C 11.4  (H) 0 - 5.6 %

## 2019-11-01 ENCOUNTER — OFFICE VISIT (OUTPATIENT)
Dept: FAMILY MEDICINE | Facility: CLINIC | Age: 74
End: 2019-11-01
Payer: MEDICARE

## 2019-11-01 VITALS
HEART RATE: 83 BPM | BODY MASS INDEX: 33.3 KG/M2 | SYSTOLIC BLOOD PRESSURE: 132 MMHG | WEIGHT: 185 LBS | TEMPERATURE: 98.9 F | DIASTOLIC BLOOD PRESSURE: 78 MMHG | OXYGEN SATURATION: 95 %

## 2019-11-01 PROCEDURE — 99214 OFFICE O/P EST MOD 30 MIN: CPT | Performed by: FAMILY MEDICINE

## 2019-11-01 RX ORDER — METFORMIN HCL 500 MG
1000 TABLET, EXTENDED RELEASE 24 HR ORAL
Qty: 180 TABLET | Refills: 0 | Status: SHIPPED | OUTPATIENT
Start: 2019-11-01 | End: 2019-11-30

## 2019-11-01 ASSESSMENT — ANXIETY QUESTIONNAIRES
6. BECOMING EASILY ANNOYED OR IRRITABLE: NOT AT ALL
2. NOT BEING ABLE TO STOP OR CONTROL WORRYING: MORE THAN HALF THE DAYS
GAD7 TOTAL SCORE: 11
5. BEING SO RESTLESS THAT IT IS HARD TO SIT STILL: NOT AT ALL
1. FEELING NERVOUS, ANXIOUS, OR ON EDGE: MORE THAN HALF THE DAYS
7. FEELING AFRAID AS IF SOMETHING AWFUL MIGHT HAPPEN: NEARLY EVERY DAY
IF YOU CHECKED OFF ANY PROBLEMS ON THIS QUESTIONNAIRE, HOW DIFFICULT HAVE THESE PROBLEMS MADE IT FOR YOU TO DO YOUR WORK, TAKE CARE OF THINGS AT HOME, OR GET ALONG WITH OTHER PEOPLE: SOMEWHAT DIFFICULT
3. WORRYING TOO MUCH ABOUT DIFFERENT THINGS: MORE THAN HALF THE DAYS

## 2019-11-01 ASSESSMENT — PATIENT HEALTH QUESTIONNAIRE - PHQ9
5. POOR APPETITE OR OVEREATING: MORE THAN HALF THE DAYS
SUM OF ALL RESPONSES TO PHQ QUESTIONS 1-9: 3

## 2019-11-01 NOTE — PROGRESS NOTES
Subjective     Claribel Del Rio is a 74 year old female who presents to clinic today for the following health issues:    HPI   Diabetes Follow-up      How often are you checking your blood sugar? Not at all    What concerns do you have today about your diabetes? None     Do you have any of these symptoms? (Select all that apply)  No numbness or tingling in feet.  No redness, sores or blisters on feet.  No complaints of excessive thirst.  No reports of blurry vision.  No significant changes to weight.     Have you had a diabetic eye exam in the last 12 months? Nov 2018     BP Readings from Last 2 Encounters:   11/01/19 132/78   10/28/19 132/74     Hemoglobin A1C (%)   Date Value   10/28/2019 11.4 (H)   12/10/2018 6.7 (H)     LDL Cholesterol Calculated (mg/dL)   Date Value   12/10/2018 109 (H)   12/08/2017 90       Diabetes Management Resources      How many servings of fruits and vegetables do you eat daily?  2-3    On average, how many sweetened beverages do you drink each day (soda, juice, sweet tea, etc)?   0    How many days per week do you miss taking your medication? 0        Patient Active Problem List   Diagnosis     Obesity     Fibromyalgia     Advanced directives, counseling/discussion     Allergic rhinitis     Low back pain     MIRZA (obstructive sleep apnea)- on CPAP     Mild intermittent asthma without complication     Status post total right knee replacement     Type 2 diabetes mellitus without complication (H)     Hyperlipidemia LDL goal <100     Mild single current episode of major depressive disorder (H)     Osteopenia     Mild intermittent asthma, unspecified whether complicated     Past Surgical History:   Procedure Laterality Date     APPENDECTOMY  1992     ARTHROPLASTY KNEE Right 6/7/2016    Procedure: ARTHROPLASTY KNEE;  Surgeon: Jose Alberto Gomez MD;  Location: [facility] OR     CARPAL TUNNEL RELEASE RT/LT  05/02, 08/02    right 5/2002, left 8/2002     COLONOSCOPY  4/2009    Normal; repeat in 10 years      COLONOSCOPY N/A 2015    Procedure: COLONOSCOPY;  Surgeon: Cathie Melendez MD;  Location: [facility]     EXCHANGE POLY COMPONENT ARTHROPLASTY KNEE Right 2017    Procedure: EXCHANGE POLY COMPONENT ARTHROPLASTY KNEE;  1.  Right knee exploration and limited scar tissue excision.   2.  Tibial polyethylene insert exchange (after removal of the original polyethylene component and following the posterior cruciate ligament resection, a deep dish polyethylene #3 of 9 mm thickness was placed)     ;  Surgeon: Jose Alberto Gomez MD;  Location: [facility] OR     HYSTERECTOMY TOTAL ABDOMINAL, BILATERAL SALPINGO-OOPHORECTOMY, COMBINED  1992    Fibroids, endometriosis, Dr Rosas     JOINT REPLACEMENT  2010    bilateral thumb     MANOMETRY (ESOPHAGEAL)  10/2007    Normal     PUNC/ASPIR BREAST CYST  2004     TONSILLECTOMY  1950       Social History     Tobacco Use     Smoking status: Former Smoker     Packs/day: 0.00     Years: 6.00     Pack years: 0.00     Types: Cigarettes     Last attempt to quit: 1974     Years since quittin.4     Smokeless tobacco: Never Used     Tobacco comment: Previous smoker, 1-1.5 packs per week   Substance Use Topics     Alcohol use: Yes     Alcohol/week: 4.0 - 6.0 standard drinks     Types: 4 - 6 Standard drinks or equivalent per week     Comment: 1 drnk monthly     Family History   Problem Relation Age of Onset     Hypertension Father      Blood Disease Father         DVT's     Eye Disorder Father      Colon Cancer Father      Colon Cancer Paternal Uncle      Asthma Sister      C.A.D. Paternal Grandfather      Diabetes Maternal Aunt      Colon Cancer Maternal Uncle      Hyperlipidemia Paternal Grandmother      Hypertension Paternal Grandmother      Hyperlipidemia Other         Aunt     Hypertension Maternal Grandfather      Hypertension Other         Aunt     Breast Cancer No family hx of      Anesthesia Reaction No family hx of      Osteoporosis No family hx of      Thyroid Disease  No family hx of          Current Outpatient Medications   Medication Sig Dispense Refill     ADVAIR DISKUS 500-50 MCG/DOSE inhaler TAKE 1 PUFF BY MOUTH TWICE A DAY 3 Inhaler 1     albuterol (PROAIR HFA/PROVENTIL HFA/VENTOLIN HFA) 108 (90 Base) MCG/ACT inhaler Inhale 2 puffs into the lungs every 4 hours as needed for shortness of breath / dyspnea or wheezing 1 Inhaler 5     blood glucose monitoring (Short Fuze CONTOUR NEXT) test strip Use to test blood sugar 2 times daily or as directed. 100 each 3     blood glucose monitoring (EMILY MICROLET) lancets Use to test blood sugar 2 times daily or as directed.  Ok to substitute alternative if insurance prefers. 100 each 3     Blood Glucose Monitoring Suppl (CONTOUR NEXT EZ MONITOR) W/DEVICE KIT 1 each daily as needed 1 kit 0     Cholecalciferol (VITAMIN D3 PO) Take 1,000 Units by mouth daily        escitalopram (LEXAPRO) 20 MG tablet Take 1 tablet (20 mg) by mouth daily 30 tablet 1     fexofenadine-pseudoePHEDrine (ALLEGRA-D)  MG per tablet Take 1 tablet by mouth 2 times daily as needed        MELATONIN PO Take 5 mg by mouth       metFORMIN (GLUCOPHAGE-XR) 500 MG 24 hr tablet Take 2 tablets (1,000 mg) by mouth daily (with dinner) 180 tablet 0     montelukast (SINGULAIR) 10 MG tablet TAKE 1 TABLET(10 MG) BY MOUTH AT BEDTIME 90 tablet 3     omeprazole (PRILOSEC) 20 MG DR capsule Take 1 capsule (20 mg) by mouth daily 90 capsule 3     simvastatin (ZOCOR) 40 MG tablet TAKE 1 TABLET BY MOUTH AT BEDTIME 90 tablet 1     zolpidem (AMBIEN) 10 MG tablet 1 TABLET AT BEDTIME AS NEEDED 30 tablet 0     Allergies   Allergen Reactions     Ivp Dye [Contrast Dye] Shortness Of Breath     Claritin [Loratadine]      Hallucinations       Recent Labs   Lab Test 10/28/19  1549 12/10/18  1146 12/08/17  1143  12/09/16  1149   A1C 11.4* 6.7* 6.2*   < > 6.0   LDL  --  109* 90  --  83   HDL  --  42* 61  --  48*   TRIG  --  145 90  --  139   ALT 28 18 24  --  27   CR 0.66 0.68 0.68   < > 0.66    GFRESTIMATED 86 85 86   < > 89   GFRESTBLACK 100 >90 >90   < > >90  African American GFR Calc     POTASSIUM 4.3 4.0 3.5   < > 3.9   TSH  --  0.88 1.27   < > 1.00    < > = values in this interval not displayed.      BP Readings from Last 3 Encounters:   11/01/19 132/78   10/28/19 132/74   12/12/18 124/56    Wt Readings from Last 3 Encounters:   11/01/19 83.9 kg (185 lb)   10/28/19 82.1 kg (181 lb)   12/10/18 82.1 kg (181 lb)                    Reviewed and updated as needed this visit by Provider         Review of Systems   ROS COMP: CONSTITUTIONAL: NEGATIVE for fever, chills, change in weight  ENT/MOUTH: NEGATIVE for ear, mouth and throat problems  RESP: NEGATIVE for significant cough or SOB  CV: NEGATIVE for chest pain, palpitations or peripheral edema      Objective    /78   Pulse 83   Temp 98.9  F (37.2  C) (Tympanic)   Wt 83.9 kg (185 lb)   SpO2 95%   BMI 33.30 kg/m    Body mass index is 33.3 kg/m .  Physical Exam   GENERAL: healthy, alert and no distress  NECK: no adenopathy, no asymmetry, masses, or scars and thyroid normal to palpation  RESP: lungs clear to auscultation - no rales, rhonchi or wheezes  CV: regular rate and rhythm, normal S1 S2, no S3 or S4, no murmur, click or rub, no peripheral edema and peripheral pulses strong  ABDOMEN: soft, nontender, no hepatosplenomegaly, no masses and bowel sounds normal  MS: no gross musculoskeletal defects noted, no edema      Lab Results   Component Value Date    A1C 11.4 10/28/2019    A1C 6.7 12/10/2018    A1C 6.2 12/08/2017    A1C 6.2 08/30/2017    A1C 6.4 06/07/2017         Assessment & Plan     1. Uncontrolled diabetes mellitus type 2 without complications (H)  Patient has not been compliant with diabetes follow ups. Increase the metformin to 1000 mg XR every day. See the dietician. Increase physical activity. Self monitor. If not improving call back in 1 week or so.   Recheck A1c in 3 months.   - metFORMIN (GLUCOPHAGE-XR) 500 MG 24 hr tablet; Take  "2 tablets (1,000 mg) by mouth daily (with dinner)  Dispense: 180 tablet; Refill: 0  - AMBULATORY ADULT DIABETES EDUCATOR REFERRAL      BMI:   Estimated body mass index is 33.3 kg/m  as calculated from the following:    Height as of 10/28/19: 1.588 m (5' 2.5\").    Weight as of this encounter: 83.9 kg (185 lb).   Weight management plan: Discussed healthy diet and exercise guidelines        Return in about 3 months (around 2/3/2020) for Diabetes Recheck.    Chata Wallace MD  [facility] [place]          "

## 2019-11-02 ASSESSMENT — ASTHMA QUESTIONNAIRES: ACT_TOTALSCORE: 23

## 2019-11-02 ASSESSMENT — ANXIETY QUESTIONNAIRES: GAD7 TOTAL SCORE: 11

## 2019-11-12 ENCOUNTER — TRANSFERRED RECORDS (OUTPATIENT)
Dept: HEALTH INFORMATION MANAGEMENT | Facility: CLINIC | Age: 74
End: 2019-11-12

## 2019-12-02 RX ORDER — METFORMIN HCL 500 MG
TABLET, EXTENDED RELEASE 24 HR ORAL
Qty: 180 TABLET | Refills: 0 | Status: SHIPPED | OUTPATIENT
Start: 2019-12-02 | End: 2020-01-06

## 2019-12-02 NOTE — TELEPHONE ENCOUNTER
"Requested Prescriptions   Pending Prescriptions Disp Refills     metFORMIN (GLUCOPHAGE-XR) 500 MG 24 hr tablet [Pharmacy Med Name: METFORMIN HCL  MG TABLET] 180 tablet 0     Sig: TAKE 2 TABLETS BY MOUTH DAILY WITH DINNER.       Biguanide Agents Passed - 11/30/2019 12:54 PM        Passed - Blood pressure less than 140/90 in past 6 months     BP Readings from Last 3 Encounters:   11/01/19 132/78   10/28/19 132/74   12/12/18 124/56                 Passed - Patient has documented LDL within the past 12 mos.     Recent Labs   Lab Test 12/10/18  1146   *             Passed - Patient has had a Microalbumin in the past 15 mos.     Recent Labs   Lab Test 12/10/18  1202   MICROL 12   UMALCR 7.58             Passed - Patient is age 10 or older        Passed - Patient has documented A1c within the specified period of time.     If HgbA1C is 8 or greater, it needs to be on file within the past 3 months.  If less than 8, must be on file within the past 6 months.     Recent Labs   Lab Test 10/28/19  1549   A1C 11.4*             Passed - Patient's CR is NOT>1.4 OR Patient's EGFR is NOT<45 within past 12 mos.     Recent Labs   Lab Test 10/28/19  1549   GFRESTIMATED 86   GFRESTBLACK 100       Recent Labs   Lab Test 10/28/19  1549   CR 0.66             Passed - Patient does NOT have a diagnosis of CHF.        Passed - Medication is active on med list        Passed - Patient is not pregnant        Passed - Patient has not had a positive pregnancy test within the past 12 mos.         Passed - Recent (6 mo) or future (30 days) visit within the authorizing provider's specialty     Patient had office visit in the last 6 months or has a visit in the next 30 days with authorizing provider or within the authorizing provider's specialty.  See \"Patient Info\" tab in inbasket, or \"Choose Columns\" in Meds & Orders section of the refill encounter.            metFORMIN (GLUCOPHAGE-XR) 500 MG 24 hr tablet 180 tablet 0 11/1/2019       Last " Written Prescription Date:  11/1/2019  Last Fill Quantity: 180,  # refills: 0   Last office visit: 11/1/2019 with prescribing provider:  Dr. Wallace   Future Office Visit: 2/3/2020  Next 5 appointments (look out 90 days)    Dec 17, 2019  1:30 PM CST  PHYSICAL with Crystal Watts MD  [facility] ([facility]) [address] [place] [id number]  [phone]   Feb 03, 2020  2:00 PM CST  Office Visit with Chata Wallace MD  [facility] ([facility]) [address] [place] [id number]  [phone]

## 2019-12-06 ENCOUNTER — ALLIED HEALTH/NURSE VISIT (OUTPATIENT)
Dept: EDUCATION SERVICES | Facility: CLINIC | Age: 74
End: 2019-12-06
Payer: MEDICARE

## 2019-12-06 DIAGNOSIS — E11.9 TYPE 2 DIABETES MELLITUS WITHOUT COMPLICATION (H): Primary | ICD-10-CM

## 2019-12-06 PROCEDURE — G0108 DIAB MANAGE TRN  PER INDIV: HCPCS

## 2019-12-06 RX ORDER — BLOOD-GLUCOSE CONTROL, NORMAL
EACH MISCELLANEOUS
Qty: 1 BOTTLE | Refills: 3 | Status: SHIPPED | OUTPATIENT
Start: 2019-12-06 | End: 2021-03-31

## 2019-12-06 NOTE — PROGRESS NOTES
"  Diabetes Self-Management Education & Support    Diabetes Education Self Management & Training    SUBJECTIVE/OBJECTIVE:  Presents for: Individual review  Accompanied by: Self  Diabetes education in the past 24mo: No  Focus of Visit: Monitoring  Diabetes type: Type 2  Disease course: Worsening  How confident are you filling out medical forms by yourself:: Not Assessed  Diabetes management related comments/concerns: Can't figure out her meters and brought 2 with. Reports she is \"starving\" and is not sure what else she can do with her diet.   Transportation concerns: No  Other concerns:: Glasses  Cultural Influences/Ethnic Background:  American    Diabetes Symptoms & Complications  Blurred vision: No  Polydipsia: No  Polyphagia: No  Polyuria: No  Weight loss: No  Weight trend: Stable       Patient Problem List and Family Medical History reviewed for relevant medical history, current medical status, and diabetes risk factors.    Vitals:  There were no vitals taken for this visit.  Estimated body mass index is 33.3 kg/m  as calculated from the following:    Height as of 10/28/19: 1.588 m (5' 2.5\").    Weight as of 11/1/19: 83.9 kg (185 lb).   Last 3 BP:   BP Readings from Last 3 Encounters:   11/01/19 132/78   10/28/19 132/74   12/12/18 124/56       History   Smoking Status     Former Smoker     Packs/day: 0.00     Years: 6.00     Types: Cigarettes     Quit date: 6/9/1974   Smokeless Tobacco     Never Used     Comment: Previous smoker, 1-1.5 packs per week       Labs:  Lab Results   Component Value Date    A1C 11.4 10/28/2019     Lab Results   Component Value Date     10/28/2019     Lab Results   Component Value Date     12/10/2018     HDL Cholesterol   Date Value Ref Range Status   12/10/2018 42 (L) >49 mg/dL Final   ]  GFR Estimate   Date Value Ref Range Status   10/28/2019 86 >60 mL/min/[1.73_m2] Final     Comment:     Starting 12/18/2018, serum creatinine based estimated GFR (eGFR) will be   calculated " using the Chronic Kidney Disease Epidemiology Collaboration   (CKD-EPI) equation.       GFR Estimate If Black   Date Value Ref Range Status   10/28/2019 100 >60 mL/min/[1.73_m2] Final     Comment:     Starting 12/18/2018, serum creatinine based estimated GFR (eGFR) will be   calculated using the Chronic Kidney Disease Epidemiology Collaboration   (CKD-EPI) equation.       Lab Results   Component Value Date    CR 0.66 10/28/2019     No results found for: MICROALBUMIN    Healthy Eating  Healthy Eating Assessed Today: Yes  Cultural/Baptism diet restrictions?: No  Breakfast: bowl of Krave cereal and skim milk and coffee with regular coffeemate creamer (few tablespoons)  Lunch: often misses because she eats breakfast later  Dinner: sandwich and soup (does not like to cook) OR meatloaf, baked potato, veg OR eats out 3-4 x/week and eats half and brings some home (Sethi, La Kirsten, Khmer)  Snacks: might have a small snack late at night (cheese and crackers OR 1/2 sandwich)  Beverages: Coffee, Water(recently cut out soda)  Craves chocolate.     Being Active  Being Active Assessed Today: Yes  Exercise:: Currently not exercising([facility] a membership [facility] the community center)  Barrier to exercise: Physical limitation(pain in back and hip)    Monitoring  Monitoring Assessed Today: Yes  Did patient bring glucose meter to appointment? : Yes  Blood Glucose Meter: ContourNext(can't figure out how to use them though)      Taking Medications  Diabetes Medication(s)     Biguanides       metFORMIN (GLUCOPHAGE-XR) 500 MG 24 hr tablet    TAKE 2 TABLETS BY MOUTH DAILY WITH DINNER.          Taking Medication Assessed Today: Yes  Current Treatments: Oral Agent (monotherapy)  Problems taking diabetes medications regularly?: No  Diabetes medication side effects?: No  Treatment Compliance: All of the time    Problem Solving  Problem Solving Assessed Today: Yes  Hypoglycemia Frequency: Never    Reducing Risks  Reducing Risks Assessed Today:  No    Healthy Coping  Healthy Coping Assessed Today: Yes  Emotional response to diabetes: Ready to learn, Concern for health and well-being  Stage of change: ACTION (Actively working towards change)  Patient Activation Measure Survey Score:  No flowsheet data found.    ASSESSMENT:  Claribel could not get her meters to work today.  Brought them both in so we checked them and I was able to get them to both work.  mg/dL in our visit today though her strips were . Threw out her strips and ordered more strips for her. Control solution also  so threw this out as well.     Likely needs her Metformin increased but as no numbers to review today unable to adjust this medication.     Pt has been making some diet changes already and is wondering what to do about her sweet tooth for chocolate. She is motivated to keep working on her diet and to try to walk more to help her BG's.          Patient's most recent   Lab Results   Component Value Date    A1C 11.4 10/28/2019    is not meeting goal of <7.0  A1c increased from 6.7 at her last check.    INTERVENTION:   Diabetes knowledge and skills assessment:   Patient is knowledgeable in diabetes management concepts related to: Healthy Eating, Taking Medication and Healthy Coping  Patient needs further education on the following diabetes management concepts: Healthy Eating, Being Active, Monitoring, Taking Medication, Problem Solving and Reducing Risks  Based on learning assessment above, most appropriate setting for further diabetes education would be: Group class or Individual setting.    Education provided today on:  AADE Self-Care Behaviors:  Diabetes Pathophysiology  Healthy Eating: carbohydrate counting, consistency in amount, composition, and timing of food intake, weight reduction and portion control. Praised her on her diet changes and encouraged protein with her meals and snacks. Since she is more of a 2 meal eater and then has 1 snack discussed aiming for  45-60 grams of carb for her 2 meals and aiming for 15-30 grams of carb for her snack. Discussed ideas such as a couple power kisses each day for her chocolate craving to see if that is more manageable.    Being Active: relationship to blood glucose and discussed ideas that she could do with her hip pain. She enjoys walking and feels she could start walking again at the community [facility].   Monitoring: proper technique, log and interpret results, individual blood glucose targets, frequency of monitoring, use of glucose control solution and proper sharps disposal  Taking Medication: action of prescribed medication  Problem Solving: high blood glucose - causes, signs/symptoms, treatment and prevention  Patient was instructed on Contour Next EZ meter and was able to provide an accurate return demonstration. Patient's blood glucose reading today was 184 mg/dL (though her strips were ).    Opportunities for ongoing education and support in diabetes-self management were discussed.    Pt verbalized understanding of concepts discussed and recommendations provided today.       Education Materials Provided:  BG Log Sheet and Carbohydrate Counting    PLAN:  See Patient Instructions for co-developed, patient-stated behavior change goals.  No medication changes made today as no numbers to review.   New test strips and control solution ordered today.  Pt to start checking her BG once daily.   If her BG's remain above target, recommend increasing Metformin to 2000 mg/day.  Claribel will begin walking at the [facility].   Follow up scheduled in 4 weeks to review her numbers and progress with her diet/exercise.     AVS printed and provided to patient today. See Follow-Up section for recommended follow-up.    LIANNE Mac CDE    Time Spent: 70 minutes  Encounter Type: Individual    Any diabetes medication dose changes were made via the CDE Protocol and Collaborative Practice Agreement with the patient's  referring provider. A copy of this encounter was shared with the provider.

## 2019-12-06 NOTE — Clinical Note
MARIA LUISA, pt came today. Has NOT been checking her BG at home because she couldn't get them to work. Showed her how to do this today. Was 184 in our visit. Likely needs her metformin increased more but no numbers to review. Scheduled a follow up in 4 weeks as this is my soonest available and encouraged her to update us sooner if needed. We focused on diet and lifestyle changes today as well. Thanks for the referral!Vickie Fry, LIANNE LD CDE

## 2019-12-06 NOTE — PATIENT INSTRUCTIONS
Fasting blood sugar goal is  mg/dL.     2 hours after a meal then the goal is < 180 mg/dL.     Check your blood sugar once daily.     Try switching to sugar free creamer    Try having some nuts or seeds with breakfast and reduce cereal a little bit.     Could try using power kisses for your sweet tooth and maybe try to limit to 4/day.      Bring blood glucose meter and logbook with you to all doctor and follow-up appointments.    Diabetes Education Telephone Visit Follow-up:    We realize your time is valuable and your health is important! We offer a convenient Telephone Visit follow up! It s a quick way to check in for a medication dose adjustment without having to come back to clinic as soon.    Telephone Visits are often covered by insurance. Please check with your insurance plan to see if this type of visit is covered. If not, the cost is less expensive than an office visit:      Up to 10 minutes (Code 08038): $30    11-20 minutes (Code 22178): $59    More than 20 minutes (Code 71702): $85    Talk with your Diabetes Educator if you want to learn more.      [facility]:  For Your Diabetes Education and Nutrition Appointments Call:  [phone]   For Diabetes Education or Nutrition Related Questions:   Phone: [phone]  E-mail: [email or web address]  Fax: [phone]   If you need a medication refill please contact your pharmacy. Please allow 3 business days for your refills to be completed.

## 2019-12-12 DIAGNOSIS — E78.5 HYPERLIPIDEMIA LDL GOAL <100: ICD-10-CM

## 2019-12-12 RX ORDER — SIMVASTATIN 40 MG
TABLET ORAL
Qty: 30 TABLET | Refills: 0 | Status: SHIPPED | OUTPATIENT
Start: 2019-12-12 | End: 2019-12-31

## 2019-12-12 NOTE — TELEPHONE ENCOUNTER
"Requested Prescriptions   Pending Prescriptions Disp Refills     simvastatin (ZOCOR) 40 MG tablet [Pharmacy Med Name: SIMVASTATIN 40 MG TABLET] 90 tablet 1     Sig: TAKE 1 TABLET BY MOUTH AT BEDTIME       Statins Protocol Failed - 12/12/2019  4:15 AM        Failed - LDL on file in past 12 months     Recent Labs   Lab Test 12/10/18  1146   *             Passed - No abnormal creatine kinase in past 12 months     Recent Labs   Lab Test 10/04/11  1155   CKT 74                Passed - Recent (12 mo) or future (30 days) visit within the authorizing provider's specialty     Patient has had an office visit with the authorizing provider or a provider within the authorizing providers department within the previous 12 mos or has a future within next 30 days. See \"Patient Info\" tab in inbasket, or \"Choose Columns\" in Meds & Orders section of the refill encounter.              Passed - Medication is active on med list        Passed - Patient is age 18 or older        Passed - No active pregnancy on record        Passed - No positive pregnancy test in past 12 months      Last Written Prescription Date:  6/25/2019  Last Fill Quantity: 90,  # refills: 1   Last office visit: 11/1/2019 with prescribing provider:  11/1/2019   Future Office Visit:   Next 5 appointments (look out 90 days)    Dec 17, 2019  1:30 PM CST  PHYSICAL with Crystal Watts MD  [facility] ([facility]) [address] [place] [id number]  [phone]   Feb 03, 2020  2:00 PM CST  Office Visit with [place]ata Wallace [place]  [facility] [place] [address]  [phone]           "

## 2019-12-12 NOTE — TELEPHONE ENCOUNTER
Medication is being filled for 1 time refill only due to:  Patient is over due for labs     Routing to team to inform and assist with scheduling. Thank you.     Marnie Varela RN, BSN  [facility] [place]

## 2019-12-13 NOTE — TELEPHONE ENCOUNTER
Elizabeth Dave contacted Claribel on 12/13/19 and left a message. If patient calls back please schedule appointment as soon as possible patient is overdue for labs.      .Elizabeth GERMAIN    [facility] [place]

## 2019-12-18 NOTE — TELEPHONE ENCOUNTER
Patient scheduled 02/03 for a diabetic check.      .Elizabeth GERMAIN    [facility] [place]

## 2019-12-26 ENCOUNTER — TELEPHONE (OUTPATIENT)
Dept: FAMILY MEDICINE | Facility: CLINIC | Age: 74
End: 2019-12-26

## 2019-12-26 NOTE — TELEPHONE ENCOUNTER
Reason for Call: Request for an order or referral:    Order or referral being requested: lab orders to check A1C    Date needed: before my next appointment    Has the patient been seen by the PCP for this problem? YES    Additional comments: Claribel stated she was supposed to have labs drawn to check A1C, no orders in chart. Made appt for Monday 1/6/19 at 2:45pm.. Thanks!    Phone number Patient can be reached at:  Cell number on file:    Telephone Information:   Mobile [phone]       Best Time:  any    Can we leave a detailed message on this number?  YES    Call taken on 12/26/2019 at 4:33 PM by Aleena Samuel

## 2019-12-30 DIAGNOSIS — E78.5 HYPERLIPIDEMIA LDL GOAL <100: ICD-10-CM

## 2019-12-31 RX ORDER — SIMVASTATIN 40 MG
TABLET ORAL
Qty: 30 TABLET | Refills: 0 | Status: SHIPPED | OUTPATIENT
Start: 2019-12-31 | End: 2020-02-03

## 2019-12-31 NOTE — TELEPHONE ENCOUNTER
"Requested Prescriptions   Pending Prescriptions Disp Refills     simvastatin (ZOCOR) 40 MG tablet [Pharmacy Med Name: SIMVASTATIN 40 MG TABLET] 90 tablet 1     Sig: TAKE 1 TABLET BY MOUTH AT BEDTIME       Statins Protocol Failed - 12/30/2019 11:11 PM        Failed - LDL on file in past 12 months     Recent Labs   Lab Test 12/10/18  1146   *             Passed - No abnormal creatine kinase in past 12 months     No lab results found.             Passed - Recent (12 mo) or future (30 days) visit within the authorizing provider's specialty     Patient has had an office visit with the authorizing provider or a provider within the authorizing providers department within the previous 12 mos or has a future within next 30 days. See \"Patient Info\" tab in inbasket, or \"Choose Columns\" in Meds & Orders section of the refill encounter.              Passed - Medication is active on med list        Passed - Patient is age 18 or older        Passed - No active pregnancy on record        Passed - No positive pregnancy test in past 12 months        simvastatin (ZOCOR) 40 MG tablet 30 tablet 0 12/12/2019       Last Written Prescription Date:  12/12/2019  Last Fill Quantity: 30,  # refills: 0   Last office visit: 11/1/2019 with prescribing provider:  Dr pantoja   Future Office Visit: 2/3/2020  Next 5 appointments (look out 90 days)    Jan 08, 2020  1:30 PM CST  PHYSICAL with Crystal Watts MD  [facility] ([facility]) [address] [place] [address]  [phone]   Feb 03, 2020  2:00 PM CST  Office Visit with Chata Pantoja MD  [facility] [place] ([address] [address]  [phone]           "

## 2020-01-06 ENCOUNTER — ALLIED HEALTH/NURSE VISIT (OUTPATIENT)
Dept: EDUCATION SERVICES | Facility: CLINIC | Age: 75
End: 2020-01-06
Payer: MEDICARE

## 2020-01-06 VITALS — WEIGHT: 180 LBS | BODY MASS INDEX: 32.4 KG/M2

## 2020-01-06 PROCEDURE — G0108 DIAB MANAGE TRN  PER INDIV: HCPCS

## 2020-01-06 RX ORDER — METFORMIN HCL 500 MG
1500 TABLET, EXTENDED RELEASE 24 HR ORAL
Qty: 270 TABLET | Refills: 0 | Status: SHIPPED | OUTPATIENT
Start: 2020-01-06 | End: 2020-02-03

## 2020-01-06 NOTE — Clinical Note
FYI, FBS are mainly 140-160 so increased her from 2 to 3 tabs Metformin per day and continuing to focus on diet/lifestyle changes. She lost 5 lb and has been modifying her diet very nicely! She sees you in 4 weeks for a recheck. Will see her a couple weeks after she sees you. Let me know if you have questions! Thanks!Vickie Fry, LIANNE LD CDE

## 2020-01-06 NOTE — PATIENT INSTRUCTIONS
Increase Metformin to 3 tabs per day. Can take all at once but make sure you take with food.     3 cups of popcorn is = 1 slice bread    Could do 2 small snacks between breakfast and dinner. Could do some fruit and cheese for example or 3 cups popcorn and some nuts or the sandwich.     Continue the 4 power kisses per day with the nuts.      Bring blood glucose meter and logbook with you to all doctor and follow-up appointments.    Diabetes Education Telephone Visit Follow-up:    We realize your time is valuable and your health is important! We offer a convenient Telephone Visit follow up! It s a quick way to check in for a medication dose adjustment without having to come back to clinic as soon.    Telephone Visits are often covered by insurance. Please check with your insurance plan to see if this type of visit is covered. If not, the cost is less expensive than an office visit:      Up to 10 minutes (Code 14781): $30    11-20 minutes (Code 64161): $59    More than 20 minutes (Code 16475): $85    Talk with your Diabetes Educator if you want to learn more.      [facility]:  For Your Diabetes Education and Nutrition Appointments Call:  [phone]   For Diabetes Education or Nutrition Related Questions:   Phone: [phone]  E-mail: [email or web address]  Fax: [phone]   If you need a medication refill please contact your pharmacy. Please allow 3 business days for your refills to be completed.

## 2020-01-06 NOTE — PROGRESS NOTES
"  Diabetes Self-Management Education & Support    Diabetes Education Self Management & Training    SUBJECTIVE/OBJECTIVE:  Presents for: Follow-up  Accompanied by: Self  Diabetes education in the past 24mo: No  Focus of Visit: Monitoring  Diabetes type: Type 2  Disease course: Worsening  How confident are you filling out medical forms by yourself:: Not Assessed  Diabetes management related comments/concerns: Feels she is starving to see better blood sugars.   Transportation concerns: No  Other concerns:: Glasses  Cultural Influences/Ethnic Background:  American    Diabetes Symptoms & Complications  Blurred vision: No  Polydipsia: No  Polyphagia: No  Polyuria: No  Weight loss: No  Weight trend: Decreasing steadily       Patient Problem List and Family Medical History reviewed for relevant medical history, current medical status, and diabetes risk factors.    Vitals:  Wt 81.6 kg (180 lb)   BMI 32.40 kg/m    Estimated body mass index is 32.4 kg/m  as calculated from the following:    Height as of 10/28/19: 1.588 m (5' 2.5\").    Weight as of this encounter: 81.6 kg (180 lb).   Last 3 BP:   BP Readings from Last 3 Encounters:   11/01/19 132/78   10/28/19 132/74   12/12/18 124/56       History   Smoking Status     Former Smoker     Packs/day: 0.00     Years: 6.00     Types: Cigarettes     Quit date: 6/9/1974   Smokeless Tobacco     Never Used     Comment: Previous smoker, 1-1.5 packs per week       Labs:  Lab Results   Component Value Date    A1C 11.4 10/28/2019     Lab Results   Component Value Date     10/28/2019     Lab Results   Component Value Date     12/10/2018     HDL Cholesterol   Date Value Ref Range Status   12/10/2018 42 (L) >49 mg/dL Final   ]  GFR Estimate   Date Value Ref Range Status   10/28/2019 86 >60 mL/min/[1.73_m2] Final     Comment:     Starting 12/18/2018, serum creatinine based estimated GFR (eGFR) will be   calculated using the Chronic Kidney Disease Epidemiology Collaboration "   (CKD-EPI) equation.       GFR Estimate If Black   Date Value Ref Range Status   10/28/2019 100 >60 mL/min/[1.73_m2] Final     Comment:     Starting 12/18/2018, serum creatinine based estimated GFR (eGFR) will be   calculated using the Chronic Kidney Disease Epidemiology Collaboration   (CKD-EPI) equation.       Lab Results   Component Value Date    CR 0.66 10/28/2019     No results found for: MICROALBUMIN    Healthy Eating  Healthy Eating Assessed Today: Yes  Cultural/Samaritan diet restrictions?: No  Patient on a regular basis: Counts carbohydrates  Meal planning: Carbohydrate counting, Smaller portions  Meals include: Breakfast, Lunch, Dinner, Snacks  Breakfast: bowl of Krave cereal (but decreased portion a little bit) and skim milk and coffee with sugar free coffeemate creamer (few tablespoons)  Lunch: sandwich or a protein bar  Dinner: sandwich and soup (does not like to cook) OR meatloaf, baked potato, veg OR eats out 3-4 x/week and eats half and brings some home (Rosa Sethi, italian)  Snacks: mixed nuts if she has a late night snack but is snacking a lot less frequently.   Beverages: Coffee, Water(recently cut out soda)    Got a new puppy (currently 8.5 weeks) so is waking up earlier now and eating breakfast earlier which means she is eating a light lunch midday now.   Has been more mindful of portions when eating out and always bringing home about half the meal.     Being Active  Being Active Assessed Today: Yes  Exercise:: Currently not exercising(has a membership to the community [employer])  Barrier to exercise: Physical limitation(pain in back and hip)  Set up a plan with a friend to walk 2-3 days per week at the community [employer]. Problem is that she despises exercise. Is seeing this friend tonight for dinner to line up the dates. Does not like to sweat.     Monitoring  Monitoring Assessed Today: Yes  Did patient bring glucose meter to appointment? : Yes  Blood Glucose Meter: ContourNext(can't  figure out how to use them though)  Home Glucose (Sugar) Monitorin-2 times per day    FBS: 148, 149, 156, 145, 136, 170, 142, 144, 138, 162, 151, 160, 151, 155, 122    Taking Medications  Diabetes Medication(s)     Biguanides       metFORMIN (GLUCOPHAGE-XR) 500 MG 24 hr tablet    Take 3 tablets (1,500 mg) by mouth daily (with dinner)      not missing any doses. Has to take close to dinner time.     Taking Medication Assessed Today: Yes  Current Treatments: Oral Agent (monotherapy)  Problems taking diabetes medications regularly?: No  Diabetes medication side effects?: No  Treatment Compliance: All of the time    Problem Solving  Problem Solving Assessed Today: Yes  Hypoglycemia Frequency: Never    Reducing Risks  Reducing Risks Assessed Today: No    Healthy Coping  Healthy Coping Assessed Today: Yes  Emotional response to diabetes: Ready to learn, Concern for health and well-being  Stage of change: ACTION (Actively working towards change)  Patient Activation Measure Survey Score:  No flowsheet data found.    ASSESSMENT:  Overall, Claribel is doing very well with her diet changes.  She has been feeling hungry between lunch and dinner though.  Explained that by waking up earlier and eating breakfast earlier this makes sense.  Discussed idea of 2 small snacks between breakfast and her dinner meal as an option.  She likes this idea.  Reports that the 4 power kisses is working well for her sweet tooth and she is adding a couple nuts to them to help fill her up more.  Has also stopped eating cheese and crackers at night and has nuts instead if she has a snack.  Her weight is down 5# since last time which is great! Praised her on this.     Focused on changes she can maintain and addressing her hunger concerns. Helped brainstorm some other ideas snack wise for her midday so she is not so hungry.    Her BG's are mostly in the 140-160 range fasting so will increase her Metformin today.       Patient's most recent   Lab  Results   Component Value Date    A1C 11.4 10/28/2019    is not meeting goal of <7.0    INTERVENTION:   Diabetes knowledge and skills assessment:   Patient is knowledgeable in diabetes management concepts related to: Healthy Eating, Monitoring, Taking Medication, Problem Solving, Reducing Risks and Healthy Coping  Patient needs further education on the following diabetes management concepts: Healthy Eating, Being Active, Monitoring and Taking Medication  Based on learning assessment above, most appropriate setting for further diabetes education would be: Group class or Individual setting.    Education provided today on:  AADE Self-Care Behaviors:  Healthy Eating: carbohydrate counting, consistency in amount, composition, and timing of food intake, weight reduction and portion control. Praised her on her weight loss and for her diet changes and encouraged her to keep this up.   Being Active: relationship to blood glucose, precautions to take and encouraged more PA in her day.   Monitoring: log and interpret results and individual blood glucose targets  Taking Medication: action of prescribed medication, side effects of prescribed medications and when to take medications. Educated on other med options if needed. She was wondering about GLP-1 medications so discussed that this would be an option if the full dose of Metformin and diet/lifestyle changes are not enough to bring her BG's down to target. However, explained that with her BG improvement with just a slight dose increase after her last A1c that I suspect a higher dose of Metformin and some exercise should help her numbers.   Healthy Coping: benefits of making appropriate lifestyle changes and utilize support systems    Opportunities for ongoing education and support in diabetes-self management were discussed.    Pt verbalized understanding of concepts discussed and recommendations provided today.       Education Materials Provided:  No new materials provided  today    PLAN:  See Patient Instructions for co-developed, patient-stated behavior change goals.  Increase metformin from 2 to 3 tabs/d (1500 mg/d).   Continue to check BG daily before breakfast. Goal is  mg/dL.   Claribel plans to add in some walking with her friend 2-3 days per week.   Continue to work on weight loss.   Try to add in a second small snack between breakfast and dinner (fruit and a string cheese for example or nuts and 3 c popcorn).   Follow up with PCP in 4 weeks.   AVS printed and provided to patient today. See Follow-Up section for recommended follow-up.    LIANNE Mac CDE    Time Spent: 40 minutes  Encounter Type: Individual    Any diabetes medication dose changes were made via the CDE Protocol and Collaborative Practice Agreement with the patient's referring provider. A copy of this encounter was shared with the provider.

## 2020-01-07 NOTE — PROGRESS NOTES
Claribel is a 74 year old  female who presents for Medicare Limited exam.     Do you have a Health Care Directive?: No, advance care planning information given to patient to review.  Patient declined advance care planning discussion at this time.    Fall risk:   Fallen 2 or more times in the past year?: No  Any fall with injury in the past year?: No    HPI  Here today for limited yearly exam --sees Dr. Wallace for primary cares.  Postmenopausal.  S/p ROXANNA/BSO with Dr. Rosas in .  No vb/spotting.  Still having hot flushes in the evening which are bothersome.  No overnight sweats.  Not currently SA.  Denies dryness or pain.  No bowel/bladder issues.  No leaking or nighttime issues.    Single; lives in 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 Wallace; has had worsening of her HgA1c this year so working hard with diabetes educator to reverse; on metformin; seeing PCP every couple of months currently; has also done other fasting bloodwork  -hx osteopenia; bone scan last year with spine -0.4, L hip -2.1, R hip -2.0  -had flu shot but wonders about shingles vaccine and coverage --will check with insurance        GYNECOLOGIC HISTORY:  No LMP recorded. Patient has had a hysterectomy..   reports that she quit smoking about 45 years ago. Her smoking use included cigarettes. She smoked 0.00 packs per day for 6.00 years. She has never used smokeless tobacco.    STD testing offered?  Declined  Last PHQ-9 score on record=   PHQ-9 SCORE 2020   PHQ-9 Total Score -   PHQ-9 Total Score 2     Last GAD7 score on record=   TRINA-7 SCORE 2018   Total Score - - -   Total Score 2 11 7       HEALTH MAINTENANCE:  Cholesterol:   Recent Labs   Lab Test 12/10/18  1146 17  1143    CHOL 180 169   HDL 42* 61   * 90   TRIG 145 90   Glu   117 127   TSH   0.88 1.27     Last Mammo: One year ago, Result: Normal, Next Mammo: Today @[facility]  Pap: None further due to age  DEXA:  20  Colonoscopy:  5/10/2016 Result:  Normal, Next Colonoscopy: 2026 years.    HISTORY:  OB History    Para Term  AB Living   0 0 0 0 0 0   SAB TAB Ectopic Multiple Live Births   0 0 0 0 0     Past Medical History:   Diagnosis Date     Acquired absence of organ, genital organs      Depressive disorder, not elsewhere classified      Diabetes (H)      Fibromyalgia      Gastroesophageal reflux disease      History of hormone replacement therapy      Hx musculoskletl dis NEC      Mastodynia      Noninfectious ileitis     IBS     Obesity      Osteoarthrosis, unspecified whether generalized or localized, hand      Other chronic pain      Other extrapyramidal disease and abnormal movement disorder      Other nonspecific findings on examination of blood(790.99)      Pelvic kidney      Personal history of unspecified urinary disorder      Uncomplicated asthma      Unspecified sleep apnea     CPAP used     Past Surgical History:   Procedure Laterality Date     APPENDECTOMY       ARTHROPLASTY KNEE Right 2016    Procedure: ARTHROPLASTY KNEE;  Surgeon: Jose Alberto Gomez MD;  Location: [facility] OR     CARPAL TUNNEL RELEASE RT/LT  ,     right 2002, left 2002     COLONOSCOPY  2009    Normal; repeat in 10 years     COLONOSCOPY N/A 2015    Procedure: COLONOSCOPY;  Surgeon: Cathie Melendez MD;  Location:  GI     EXCHANGE POLY COMPONENT ARTHROPLASTY KNEE Right 2017    Procedure: EXCHANGE POLY COMPONENT ARTHROPLASTY KNEE;  1.  Right knee exploration and limited scar tissue excision.   2.  Tibial polyethylene insert exchange (after removal of the original polyethylene component and following the posterior cruciate ligament resection, a deep dish polyethylene #3 of 9 mm thickness was placed)      ;  Surgeon: Jose Alberto Gomez MD;  Location: [facility] OR     HYSTERECTOMY TOTAL ABDOMINAL, BILATERAL SALPINGO-OOPHORECTOMY, COMBINED  1992    Fibroids, endometriosis, Dr Rosas     JOINT REPLACEMENT  2010    bilateral thumb     MANOMETRY (ESOPHAGEAL)  10/2007    Normal     PUNC/ASPIR BREAST CYST  2004     TONSILLECTOMY  1950     Family History   Problem Relation Age of Onset     Hypertension Father      Blood Disease Father         DVT's     Eye Disorder Father      Colon Cancer Father      Colon Cancer Paternal Uncle      Asthma Sister      C.A.D. Paternal Grandfather      Diabetes Maternal Aunt      Colon Cancer Maternal Uncle      Hyperlipidemia Paternal Grandmother      Hypertension Paternal Grandmother      Hyperlipidemia Other         Aunt     Hypertension Maternal Grandfather      Hypertension Other         Aunt     Breast Cancer No family hx of      Anesthesia Reaction No family hx of      Osteoporosis No family hx of      Thyroid Disease No family hx of      Social History     Socioeconomic History     Marital status:      Spouse name: Not on file     Number of children: 0     Years of education: 18     Highest education level: Not on file   Occupational History     Occupation: Psychologist     Employer: RETIRED     Comment: worked for [employer]   Social Needs     Financial resource strain: Not on file     Food insecurity:     Worry: Not on file     Inability: Not on file     Transportation needs:     Medical: Not on file     Non-medical: Not on file   Tobacco Use     Smoking status: Former Smoker     Packs/day: 0.00     Years: 6.00     Pack years: 0.00     Types: Cigarettes     Last attempt to quit: 1974     Years since quittin.6     Smokeless tobacco: Never Used     Tobacco comment: Previous smoker, 1-1.5 packs per week   Substance and Sexual Activity     Alcohol use: Yes     Alcohol/week: 4.0 - 6.0 standard drinks     Types: 4 - 6 Standard drinks or equivalent per  week     Comment: 1 drnk monthly     Drug use: No     Sexual activity: Not Currently     Birth control/protection: Post-menopausal   Lifestyle     Physical activity:     Days per week: Not on file     Minutes per session: Not on file     Stress: Not on file   Relationships     Social connections:     Talks on phone: Not on file     Gets together: Not on file     Attends Congregation service: Not on file     Active member of club or organization: Not on file     Attends meetings of clubs or organizations: Not on file     Relationship status: Not on file     Intimate partner violence:     Fear of current or ex partner: Not on file     Emotionally abused: Not on file     Physically abused: Not on file     Forced sexual activity: Not on file   Other Topics Concern      Service Not Asked     Blood Transfusions No     Caffeine Concern Not Asked     Occupational Exposure Not Asked     Hobby Hazards Not Asked     Sleep Concern Yes     Comment: has sleep apnea     Stress Concern Not Asked     Weight Concern Not Asked     Special Diet No     Back Care No     Exercise Yes     Comment: Some     Bike Helmet Not Asked     Comment: Does not ride     Seat Belt Yes     Self-Exams Not Asked     Parent/sibling w/ CABG, MI or angioplasty before 65F 55M? Not Asked   Social History Narrative    Eats fruits and vegetables every day. Calcium and vitamin D supplements recommended.      Current Outpatient Medications   Medication Sig     blood glucose (CONTOUR NEXT TEST) test strip Use to test blood sugar once daily or as directed.     blood glucose calibration (CONTOUR NEXT CONTROL NORMAL VI SOLN) Normal solution Use to calibrate blood glucose monitor as needed as directed.     blood glucose monitoring (gDecide CONTOUR NEXT) test strip Use to test blood sugar 2 times daily or as directed.     Blood Glucose Monitoring Suppl (CONTOUR NEXT EZ MONITOR) W/DEVICE KIT 1 each daily as needed     escitalopram (LEXAPRO) 20 MG tablet Take 1 tablet  "(20 mg) by mouth daily     fexofenadine-pseudoePHEDrine (ALLEGRA-D)  MG per tablet Take 1 tablet by mouth 2 times daily as needed      MELATONIN PO Take 5 mg by mouth     metFORMIN (GLUCOPHAGE-XR) 500 MG 24 hr tablet Take 3 tablets (1,500 mg) by mouth daily (with dinner)     montelukast (SINGULAIR) 10 MG tablet TAKE 1 TABLET(10 MG) BY MOUTH AT BEDTIME     omeprazole (PRILOSEC) 20 MG DR capsule Take 1 capsule (20 mg) by mouth daily     simvastatin (ZOCOR) 40 MG tablet TAKE 1 TABLET BY MOUTH AT BEDTIME     zolpidem (AMBIEN) 10 MG tablet 1 TABLET AT BEDTIME AS NEEDED     ADVAIR DISKUS 500-50 MCG/DOSE inhaler TAKE 1 PUFF BY MOUTH TWICE A DAY     albuterol (PROAIR HFA/PROVENTIL HFA/VENTOLIN HFA) 108 (90 Base) MCG/ACT inhaler Inhale 2 puffs into the lungs every 4 hours as needed for shortness of breath / dyspnea or wheezing     ARIPiprazole (ABILIFY) 2 MG tablet      blood glucose monitoring ([employer] MICROLET) lancets Use to test blood sugar 2 times daily or as directed.  Ok to substitute alternative if insurance prefers.     BREO ELLIPTA 200-25 MCG/INH Inhaler INHALE 1 PUFF BY MOUTH ONCE DAILY AT THE SAME TIME EACH DAY     Cholecalciferol (VITAMIN D3 PO) Take 1,000 Units by mouth daily      LORazepam (ATIVAN) 0.5 MG tablet TAKE 1 TABLET BY MOUTH THREE TIMES A DAY     No current facility-administered medications for this visit.      Allergies   Allergen Reactions     Ivp Dye [Contrast Dye] Shortness Of Breath     Claritin [Loratadine]      Hallucinations         Past medical, surgical, social and family history were reviewed and updated in [facility].    EXAM:  /78   Pulse 80   Ht 1.588 m (5' 2.5\")   Wt 82.1 kg (181 lb)   BMI 32.58 kg/m     BMI: Body mass index is 32.58 kg/m .    Constitutional: Appearance: Well nourished, well developed alert, in no acute distress  Breasts: Inspection of Breasts:  No lymphadenopathy present    Palpation of Breasts and Axillae:  No masses present on palpation, no  breast " tenderness    Axillary Lymph Nodes:  No lymphadenopathy present  Neurologic/Psychiatric:    Mental Status:  Oriented X3     Pelvic Exam:  External Genitalia:     Normal appearance for age, no discharge present, no tenderness present, no inflammatory lesions present, color normal  Vagina:     Normal vaginal vault without central or paravaginal defects, no discharge present, no inflammatory lesions present, no masses present  Bladder:     Nontender to palpation  Urethra:   Urethral Body:  Urethra palpation normal, urethra structural support normal   Urethral Meatus:  No erythema or lesions present  Cervix:     Surgically absent  Uterus:     Surgically absent  Adnexa:     Surgically absent  Perineum:     Perineum within normal limits, no evidence of trauma, no rashes or skin lesions present  Anus:     Anus within normal limits, no hemorrhoids present  Inguinal Lymph Nodes:     No lymphadenopathy present    Body mass index is 32.58 kg/m .  Weight management plan: Discussed healthy diet and exercise guidelines Patient was referred to their PCP to discuss a diet and exercise plan.   reports that she quit smoking about 45 years ago. Her smoking use included cigarettes. She smoked 0.00 packs per day for 6.00 years. She has never used smokeless tobacco.      ASSESSMENT:  74 year old female with satisfactory annual exam.    ICD-10-CM    1. Encounter for gynecological examination without abnormal finding Z01.419 Medicare Limited Visit       COUNSELING:   Reviewed preventive health counseling, as reflected in patient instructions  Special attention given to:       Regular exercise       Healthy diet/nutrition       (Shilpi)menopause management    PLAN/PATIENT INSTRUCTIONS:    Patient Instructions   Follow up with your primary care provider for your other medical problems.  Continue self breast exam.  Increase physical activity and exercise.  Usual safety and preventative measures counseling done.  BMI >25  Weight loss  encouraged.  Last pap smear was normal.  No pap was obtained this year due to patient age and hysterectomy per guidelines.  This was discussed with the patient and she agrees with the plan.  Discussed Osteoporosis screening as well as calcium and Vitamin D recommendations.      Crystal Watts MD

## 2020-01-08 ENCOUNTER — OFFICE VISIT (OUTPATIENT)
Dept: OBGYN | Facility: CLINIC | Age: 75
End: 2020-01-08
Payer: MEDICARE

## 2020-01-08 ENCOUNTER — TRANSFERRED RECORDS (OUTPATIENT)
Dept: HEALTH INFORMATION MANAGEMENT | Facility: CLINIC | Age: 75
End: 2020-01-08

## 2020-01-08 VITALS
HEART RATE: 80 BPM | DIASTOLIC BLOOD PRESSURE: 78 MMHG | WEIGHT: 181 LBS | BODY MASS INDEX: 32.07 KG/M2 | HEIGHT: 63 IN | SYSTOLIC BLOOD PRESSURE: 122 MMHG

## 2020-01-08 DIAGNOSIS — Z01.419 ENCOUNTER FOR GYNECOLOGICAL EXAMINATION WITHOUT ABNORMAL FINDING: Primary | ICD-10-CM

## 2020-01-08 PROCEDURE — G0101 CA SCREEN;PELVIC/BREAST EXAM: HCPCS | Performed by: OBSTETRICS & GYNECOLOGY

## 2020-01-08 RX ORDER — LORAZEPAM 0.5 MG/1
TABLET ORAL
Refills: 2 | COMMUNITY
Start: 2019-11-26 | End: 2020-11-02

## 2020-01-08 RX ORDER — ARIPIPRAZOLE 2 MG/1
TABLET ORAL
COMMUNITY
Start: 2019-12-20 | End: 2024-07-15

## 2020-01-08 SDOH — HEALTH STABILITY: MENTAL HEALTH: HOW OFTEN DO YOU HAVE A DRINK CONTAINING ALCOHOL?: NEVER

## 2020-01-08 SDOH — HEALTH STABILITY: MENTAL HEALTH: HOW OFTEN DO YOU HAVE 6 OR MORE DRINKS ON ONE OCCASION?: NEVER

## 2020-01-08 ASSESSMENT — ANXIETY QUESTIONNAIRES
6. BECOMING EASILY ANNOYED OR IRRITABLE: SEVERAL DAYS
GAD7 TOTAL SCORE: 7
5. BEING SO RESTLESS THAT IT IS HARD TO SIT STILL: SEVERAL DAYS
3. WORRYING TOO MUCH ABOUT DIFFERENT THINGS: SEVERAL DAYS
1. FEELING NERVOUS, ANXIOUS, OR ON EDGE: SEVERAL DAYS
IF YOU CHECKED OFF ANY PROBLEMS ON THIS QUESTIONNAIRE, HOW DIFFICULT HAVE THESE PROBLEMS MADE IT FOR YOU TO DO YOUR WORK, TAKE CARE OF THINGS AT HOME, OR GET ALONG WITH OTHER PEOPLE: SOMEWHAT DIFFICULT
2. NOT BEING ABLE TO STOP OR CONTROL WORRYING: SEVERAL DAYS
7. FEELING AFRAID AS IF SOMETHING AWFUL MIGHT HAPPEN: SEVERAL DAYS

## 2020-01-08 ASSESSMENT — MIFFLIN-ST. JEOR: SCORE: 1292.2

## 2020-01-08 ASSESSMENT — PATIENT HEALTH QUESTIONNAIRE - PHQ9
SUM OF ALL RESPONSES TO PHQ QUESTIONS 1-9: 2
5. POOR APPETITE OR OVEREATING: SEVERAL DAYS

## 2020-01-08 NOTE — PATIENT INSTRUCTIONS
Follow up with your primary care provider for your other medical problems.  Continue self breast exam.  Increase physical activity and exercise.  Usual safety and preventative measures counseling done.  BMI >25  Weight loss encouraged.  Last pap smear was normal.  No pap was obtained this year due to patient age and hysterectomy per guidelines.  This was discussed with the patient and she agrees with the plan.  Discussed Osteoporosis screening as well as calcium and Vitamin D recommendations.

## 2020-01-09 ASSESSMENT — ANXIETY QUESTIONNAIRES: GAD7 TOTAL SCORE: 7

## 2020-02-03 ENCOUNTER — OFFICE VISIT (OUTPATIENT)
Dept: FAMILY MEDICINE | Facility: CLINIC | Age: 75
End: 2020-02-03
Payer: MEDICARE

## 2020-02-03 VITALS
HEART RATE: 94 BPM | OXYGEN SATURATION: 97 % | TEMPERATURE: 97.3 F | WEIGHT: 176 LBS | BODY MASS INDEX: 31.18 KG/M2 | DIASTOLIC BLOOD PRESSURE: 66 MMHG | HEIGHT: 63 IN | SYSTOLIC BLOOD PRESSURE: 114 MMHG

## 2020-02-03 DIAGNOSIS — K21.9 GASTROESOPHAGEAL REFLUX DISEASE WITHOUT ESOPHAGITIS: ICD-10-CM

## 2020-02-03 DIAGNOSIS — E78.5 HYPERLIPIDEMIA LDL GOAL <100: Primary | ICD-10-CM

## 2020-02-03 DIAGNOSIS — E11.9 TYPE 2 DIABETES MELLITUS WITHOUT COMPLICATION, WITHOUT LONG-TERM CURRENT USE OF INSULIN (H): ICD-10-CM

## 2020-02-03 DIAGNOSIS — J45.20 MILD INTERMITTENT ASTHMA WITHOUT COMPLICATION: ICD-10-CM

## 2020-02-03 LAB — HBA1C MFR BLD: 7.4 % (ref 0–5.6)

## 2020-02-03 PROCEDURE — 36415 COLL VENOUS BLD VENIPUNCTURE: CPT | Performed by: FAMILY MEDICINE

## 2020-02-03 PROCEDURE — 80048 BASIC METABOLIC PNL TOTAL CA: CPT | Performed by: FAMILY MEDICINE

## 2020-02-03 PROCEDURE — 82043 UR ALBUMIN QUANTITATIVE: CPT | Performed by: FAMILY MEDICINE

## 2020-02-03 PROCEDURE — 80061 LIPID PANEL: CPT | Performed by: FAMILY MEDICINE

## 2020-02-03 PROCEDURE — 84443 ASSAY THYROID STIM HORMONE: CPT | Performed by: FAMILY MEDICINE

## 2020-02-03 PROCEDURE — 99214 OFFICE O/P EST MOD 30 MIN: CPT | Performed by: FAMILY MEDICINE

## 2020-02-03 PROCEDURE — 83036 HEMOGLOBIN GLYCOSYLATED A1C: CPT | Performed by: FAMILY MEDICINE

## 2020-02-03 RX ORDER — MONTELUKAST SODIUM 10 MG/1
TABLET ORAL
Qty: 90 TABLET | Refills: 3 | Status: SHIPPED | OUTPATIENT
Start: 2020-02-03 | End: 2021-03-31

## 2020-02-03 RX ORDER — SIMVASTATIN 40 MG
40 TABLET ORAL AT BEDTIME
Qty: 90 TABLET | Refills: 3 | Status: SHIPPED | OUTPATIENT
Start: 2020-02-03 | End: 2021-02-24

## 2020-02-03 RX ORDER — METFORMIN HCL 500 MG
1500 TABLET, EXTENDED RELEASE 24 HR ORAL
Qty: 270 TABLET | Refills: 3 | Status: SHIPPED | OUTPATIENT
Start: 2020-02-03 | End: 2020-04-20

## 2020-02-03 RX ORDER — CHOLECALCIFEROL (VITAMIN D3) 50 MCG
1 TABLET ORAL DAILY
COMMUNITY
Start: 2020-02-03

## 2020-02-03 ASSESSMENT — PATIENT HEALTH QUESTIONNAIRE - PHQ9
SUM OF ALL RESPONSES TO PHQ QUESTIONS 1-9: 1
5. POOR APPETITE OR OVEREATING: SEVERAL DAYS

## 2020-02-03 ASSESSMENT — ANXIETY QUESTIONNAIRES
GAD7 TOTAL SCORE: 7
5. BEING SO RESTLESS THAT IT IS HARD TO SIT STILL: NOT AT ALL
1. FEELING NERVOUS, ANXIOUS, OR ON EDGE: SEVERAL DAYS
6. BECOMING EASILY ANNOYED OR IRRITABLE: NOT AT ALL
3. WORRYING TOO MUCH ABOUT DIFFERENT THINGS: MORE THAN HALF THE DAYS
7. FEELING AFRAID AS IF SOMETHING AWFUL MIGHT HAPPEN: SEVERAL DAYS
2. NOT BEING ABLE TO STOP OR CONTROL WORRYING: MORE THAN HALF THE DAYS
IF YOU CHECKED OFF ANY PROBLEMS ON THIS QUESTIONNAIRE, HOW DIFFICULT HAVE THESE PROBLEMS MADE IT FOR YOU TO DO YOUR WORK, TAKE CARE OF THINGS AT HOME, OR GET ALONG WITH OTHER PEOPLE: SOMEWHAT DIFFICULT

## 2020-02-03 ASSESSMENT — MIFFLIN-ST. JEOR: SCORE: 1269.52

## 2020-02-03 NOTE — LETTER
February 4, 2020      Claribel RIMA Eliane  [address] [place] [address]        Dear ,    I have reviewed your recent labs. Here are the results:    -Cholesterol levels are at your goal levels.  ADVISE: continuing your medication, a regular exercise program with at least 150 minutes of aerobic exercise per week, and eating a low saturated fat/low carbohydrate diet.  Also, you should recheck this fasting cholesterol panel in 12 months.  -Kidney function is normal (Cr, GFR), Sodium is normal, Potassium is normal, Calcium is normal, Glucose is normal.   -A1C (test of diabetes control the last 2-3 months) is at your goal. Please recheck your A1C test in 3 months.   -TSH (thyroid stimulating hormone) level is normal which indicates normal thyroid function.  -Microalbumin (urine protein) test is normal.  ADVISE: rechecking this annually.    Results for orders placed or performed in visit on 02/03/20   Basic metabolic panel     Status: Abnormal   Result Value Ref Range    Sodium 141 133 - 144 mmol/L    Potassium 4.4 3.4 - 5.3 mmol/L    Chloride 109 94 - 109 mmol/L    Carbon Dioxide 27 20 - 32 mmol/L    Anion Gap 5 3 - 14 mmol/L    Glucose 143 (H) 70 - 99 mg/dL    Urea Nitrogen 22 7 - 30 mg/dL    Creatinine 0.72 0.52 - 1.04 mg/dL    GFR Estimate 83 >60 mL/min/[1.73_m2]    GFR Estimate If Black >90 >60 mL/min/[1.73_m2]    Calcium 9.6 8.5 - 10.1 mg/dL   Lipid panel reflex to direct LDL Fasting     Status: Abnormal   Result Value Ref Range    Cholesterol 155 <200 mg/dL    Triglycerides 158 (H) <150 mg/dL    HDL Cholesterol 43 (L) >49 mg/dL    LDL Cholesterol Calculated 80 <100 mg/dL    Non HDL Cholesterol 112 <130 mg/dL   TSH with free T4 reflex     Status: None   Result Value Ref Range    TSH 0.90 0.40 - 4.00 mU/L   Albumin Random Urine Quantitative with Creat Ratio     Status: None   Result Value Ref Range    Creatinine Urine 138 mg/dL    Albumin Urine mg/L 10 mg/L    Albumin Urine mg/g Cr 7.39 0 - 25 mg/g Cr    Hemoglobin A1c     Status: Abnormal   Result Value Ref Range    Hemoglobin A1C 7.4 (H) 0 - 5.6 %             If you have any questions or concerns, please call the clinic at the number listed above.       Sincerely,        Chata Wallace MD

## 2020-02-03 NOTE — PROGRESS NOTES
Subjective     Claribel Del Rio is a 74 year old female who presents to clinic today for the following health issues:    HPI   Diabetes Follow-up    How often are you checking your blood sugar? One time daily  What time of day are you checking your blood sugars (select all that apply)?  Before meals  Have you had any blood sugars above 200?  No  Have you had any blood sugars below 70?  No    What symptoms do you notice when your blood sugar is low?  None    What concerns do you have today about your diabetes? Other: keep blood sugar within range      Do you have any of these symptoms? (Select all that apply)  No numbness or tingling in feet.  No redness, sores or blisters on feet.  No complaints of excessive thirst.  No reports of blurry vision.  No significant changes to weight.      BP Readings from Last 2 Encounters:   02/03/20 114/66   01/08/20 122/78     Hemoglobin A1C (%)   Date Value   02/03/2020 7.4 (H)   10/28/2019 11.4 (H)     LDL Cholesterol Calculated (mg/dL)   Date Value   12/10/2018 109 (H)   12/08/2017 90                 How many servings of fruits and vegetables do you eat daily?  2-3    On average, how many sweetened beverages do you drink each day (Examples: soda, juice, sweet tea, etc.  Do NOT count diet or artificially sweetened beverages)?   0    How many days per week do you exercise enough to make your heart beat faster? 3 or less    How many days per week do you miss taking your medication? 0    Hyperlipidemia Follow-Up      Are you regularly taking any medication or supplement to lower your cholesterol?   Yes- statin    Are you having muscle aches or other side effects that you think could be caused by your cholesterol lowering medication?  No    Asthma Follow-Up    Was ACT completed today?    Yes    ACT Total Scores 2/3/2020   ACT TOTAL SCORE -   ASTHMA ER VISITS -   ASTHMA HOSPITALIZATIONS -   ACT TOTAL SCORE (Goal Greater than or Equal to 20) 25   In the past 12 months, how many times  did you visit the emergency room for your asthma without being admitted to the hospital? 0   In the past 12 months, how many times were you hospitalized overnight because of your asthma? 0       How many days per week do you miss taking your asthma controller medication?  0    Please describe any recent triggers for your asthma: None    Have you had any Emergency Room Visits, Urgent Care Visits, or Hospital Admissions since your last office visit?  No      Patient Active Problem List   Diagnosis     Obesity     Fibromyalgia     Advanced directives, counseling/discussion     Allergic rhinitis     Low back pain     MIRZA (obstructive sleep apnea)- on CPAP     Mild intermittent asthma without complication     Status post total right knee replacement     Type 2 diabetes mellitus without complication (H)     Hyperlipidemia LDL goal <100     Mild single current episode of major depressive disorder (H)     Osteopenia     Mild intermittent asthma, unspecified whether complicated     Past Surgical History:   Procedure Laterality Date     APPENDECTOMY  1992     ARTHROPLASTY KNEE Right 6/7/2016    Procedure: ARTHROPLASTY KNEE;  Surgeon: Jose Alberto Gomez MD;  Location: [facility] OR     CARPAL TUNNEL RELEASE RT/LT  05/02, 08/02    right 5/2002, left 8/2002     COLONOSCOPY  4/2009    Normal; repeat in 10 years     COLONOSCOPY N/A 11/4/2015    Procedure: COLONOSCOPY;  Surgeon: Cathie Melendez MD;  Location:  GI     EXCHANGE POLY COMPONENT ARTHROPLASTY KNEE Right 9/5/2017    Procedure: EXCHANGE POLY COMPONENT ARTHROPLASTY KNEE;  1.  Right knee exploration and limited scar tissue excision.   2.  Tibial polyethylene insert exchange (after removal of the original polyethylene component and following the posterior cruciate ligament resection, a deep dish polyethylene #3 of 9 mm thickness was placed)     ;  Surgeon: Jose Alberto Gomez MD;  Location: [facility] OR     HYSTERECTOMY TOTAL ABDOMINAL, BILATERAL SALPINGO-OOPHORECTOMY, COMBINED   1992    Fibroids, endometriosis, Dr Rosas     JOINT REPLACEMENT  2010    bilateral thumb     MANOMETRY (ESOPHAGEAL)  10/2007    Normal     PUNC/ASPIR BREAST CYST  2004     TONSILLECTOMY  1950       Social History     Tobacco Use     Smoking status: Former Smoker     Packs/day: 0.00     Years: 6.00     Pack years: 0.00     Types: Cigarettes     Last attempt to quit: 1974     Years since quittin.6     Smokeless tobacco: Never Used     Tobacco comment: Previous smoker, 1-1.5 packs per week   Substance Use Topics     Alcohol use: Yes     Alcohol/week: 4.0 - 6.0 standard drinks     Types: 4 - 6 Standard drinks or equivalent per week     Frequency: Never     Binge frequency: Never     Comment: 1 sp monthly     Family History   Problem Relation Age of Onset     Hypertension Father      Blood Disease Father         DVT's     Eye Disorder Father      Colon Cancer Father      Colon Cancer Paternal Uncle      Asthma Sister      C.A.D. Paternal Grandfather      Diabetes Maternal Aunt      Colon Cancer Maternal Uncle      Hyperlipidemia Paternal Grandmother      Hypertension Paternal Grandmother      Hyperlipidemia Other         Aunt     Hypertension Maternal Grandfather      Hypertension Other         Aunt     Breast Cancer No family hx of      Anesthesia Reaction No family hx of      Osteoporosis No family hx of      Thyroid Disease No family hx of          Current Outpatient Medications   Medication Sig Dispense Refill     ADVAIR DISKUS 500-50 MCG/DOSE inhaler TAKE 1 PUFF BY MOUTH TWICE A DAY 3 Inhaler 1     albuterol (PROAIR HFA/PROVENTIL HFA/VENTOLIN HFA) 108 (90 Base) MCG/ACT inhaler Inhale 2 puffs into the lungs every 4 hours as needed for shortness of breath / dyspnea or wheezing 1 Inhaler 5     ARIPiprazole (ABILIFY) 2 MG tablet        blood glucose (EMILY CONTOUR NEXT) test strip Use to test blood sugar 2 times daily or as directed. 100 each 3     blood glucose (CONTOUR NEXT TEST) test strip Use to  test blood sugar once daily or as directed. 100 each 4     blood glucose calibration (CONTOUR NEXT CONTROL NORMAL VI SOLN) Normal solution Use to calibrate blood glucose monitor as needed as directed. 1 Bottle 3     blood glucose monitoring (EMILY MICROLET) lancets Use to test blood sugar 2 times daily or as directed.  Ok to substitute alternative if insurance prefers. 100 each 3     Blood Glucose Monitoring Suppl (CONTOUR NEXT EZ MONITOR) W/DEVICE KIT 1 each daily as needed 1 kit 0     calcium carbonate (OS-NETO) 1500 (600 Ca) MG tablet Take 1 tablet (600 mg) by mouth 2 times daily (with meals)       escitalopram (LEXAPRO) 20 MG tablet Take 1 tablet (20 mg) by mouth daily 30 tablet 1     fexofenadine-pseudoePHEDrine (ALLEGRA-D)  MG per tablet Take 1 tablet by mouth 2 times daily as needed        LORazepam (ATIVAN) 0.5 MG tablet TAKE 1 TABLET BY MOUTH THREE TIMES A DAY  2     MELATONIN PO Take 5 mg by mouth       metFORMIN (GLUCOPHAGE-XR) 500 MG 24 hr tablet Take 3 tablets (1,500 mg) by mouth daily (with dinner) 270 tablet 3     montelukast (SINGULAIR) 10 MG tablet TAKE 1 TABLET(10 MG) BY MOUTH AT BEDTIME 90 tablet 3     omeprazole (PRILOSEC) 20 MG DR capsule Take 1 capsule (20 mg) by mouth daily 90 capsule 3     simvastatin (ZOCOR) 40 MG tablet Take 1 tablet (40 mg) by mouth At Bedtime 90 tablet 3     vitamin D3 (CHOLECALCIFEROL) 2000 units (50 mcg) tablet Take 1 tablet (2,000 Units) by mouth daily       zolpidem (AMBIEN) 10 MG tablet 1 TABLET AT BEDTIME AS NEEDED 30 tablet 0     Allergies   Allergen Reactions     Ivp Dye [Contrast Dye] Shortness Of Breath     Claritin [Loratadine]      Hallucinations       Recent Labs   Lab Test 02/03/20  1350 10/28/19  1549 12/10/18  1146 12/08/17  1143  12/09/16  1149   A1C 7.4* 11.4* 6.7* 6.2*   < > 6.0   LDL  --   --  109* 90  --  83   HDL  --   --  42* 61  --  48*   TRIG  --   --  145 90  --  139   ALT  --  28 18 24  --  27   CR  --  0.66 0.68 0.68   < > 0.66  "  GFRESTIMATED  --  86 85 86   < > 89   GFRESTBLACK  --  100 >90 >90   < > >90  African American GFR Calc     POTASSIUM  --  4.3 4.0 3.5   < > 3.9   TSH  --   --  0.88 1.27   < > 1.00    < > = values in this interval not displayed.      BP Readings from Last 3 Encounters:   02/03/20 114/66   01/08/20 122/78   11/01/19 132/78    Wt Readings from Last 3 Encounters:   02/03/20 79.8 kg (176 lb)   01/08/20 82.1 kg (181 lb)   01/06/20 81.6 kg (180 lb)                    Reviewed and updated as needed this visit by Provider         Review of Systems   ROS COMP: CONSTITUTIONAL: NEGATIVE for fever, chills, change in weight  ENT/MOUTH: NEGATIVE for ear, mouth and throat problems  RESP: NEGATIVE for significant cough or SOB  CV: NEGATIVE for chest pain, palpitations or peripheral edema      Objective    /66   Pulse 94   Temp 97.3  F (36.3  C) (Tympanic)   Ht 1.588 m (5' 2.5\")   Wt 79.8 kg (176 lb)   SpO2 97%   BMI 31.68 kg/m    Body mass index is 31.68 kg/m .  Physical Exam   GENERAL: healthy, alert and no distress  NECK: no adenopathy, no asymmetry, masses, or scars and thyroid normal to palpation  RESP: lungs clear to auscultation - no rales, rhonchi or wheezes  CV: regular rate and rhythm, normal S1 S2, no S3 or S4, no murmur, click or rub, no peripheral edema and peripheral pulses strong  ABDOMEN: soft, nontender, no hepatosplenomegaly, no masses and bowel sounds normal  MS: no gross musculoskeletal defects noted, no edema          Results for orders placed or performed in visit on 02/03/20   Hemoglobin A1c     Status: Abnormal   Result Value Ref Range    Hemoglobin A1C 7.4 (H) 0 - 5.6 %       Assessment & Plan     1. Hyperlipidemia LDL goal <100  Previously LDL was higher than 100.  Labs repeated.  Resume simvastatin 40 mg daily.  - Lipid panel reflex to direct LDL Fasting  - simvastatin (ZOCOR) 40 MG tablet; Take 1 tablet (40 mg) by mouth At Bedtime  Dispense: 90 tablet; Refill: 3    2. Type 2 diabetes " mellitus without complication, without long-term current use of insulin (H)  Diabetic control is significantly improved.  Recommending to resume current regimen.  Refill on medications ordered.  Await the rest of the lab results.  - Basic metabolic panel  - TSH with free T4 reflex  - Albumin Random Urine Quantitative with Creat Ratio  - Hemoglobin A1c  - blood glucose (EMILY CONTOUR NEXT) test strip; Use to test blood sugar 2 times daily or as directed.  Dispense: 100 each; Refill: 3  - blood glucose monitoring (EMILY MICROLET) lancets; Use to test blood sugar 2 times daily or as directed.  Ok to substitute alternative if insurance prefers.  Dispense: 100 each; Refill: 3  - metFORMIN (GLUCOPHAGE-XR) 500 MG 24 hr tablet; Take 3 tablets (1,500 mg) by mouth daily (with dinner)  Dispense: 270 tablet; Refill: 3    3. Gastroesophageal reflux disease without esophagitis  Symptoms well managed with omeprazole.  Patient requested refill  - omeprazole (PRILOSEC) 20 MG DR capsule; Take 1 capsule (20 mg) by mouth daily  Dispense: 90 capsule; Refill: 3    4. Mild intermittent asthma without complication  Well-controlled symptoms.  Refill ordered.  - montelukast (SINGULAIR) 10 MG tablet; TAKE 1 TABLET(10 MG) BY MOUTH AT BEDTIME  Dispense: 90 tablet; Refill: 3      Return in about 3 months (around 5/3/2020) for Annual Physical Exam.    Chata Wallace MD  [facility] [place]

## 2020-02-04 LAB
ANION GAP SERPL CALCULATED.3IONS-SCNC: 5 MMOL/L (ref 3–14)
BUN SERPL-MCNC: 22 MG/DL (ref 7–30)
CALCIUM SERPL-MCNC: 9.6 MG/DL (ref 8.5–10.1)
CHLORIDE SERPL-SCNC: 109 MMOL/L (ref 94–109)
CHOLEST SERPL-MCNC: 155 MG/DL
CO2 SERPL-SCNC: 27 MMOL/L (ref 20–32)
CREAT SERPL-MCNC: 0.72 MG/DL (ref 0.52–1.04)
CREAT UR-MCNC: 138 MG/DL
GFR SERPL CREATININE-BSD FRML MDRD: 83 ML/MIN/{1.73_M2}
GLUCOSE SERPL-MCNC: 143 MG/DL (ref 70–99)
HDLC SERPL-MCNC: 43 MG/DL
LDLC SERPL CALC-MCNC: 80 MG/DL
MICROALBUMIN UR-MCNC: 10 MG/L
MICROALBUMIN/CREAT UR: 7.39 MG/G CR (ref 0–25)
NONHDLC SERPL-MCNC: 112 MG/DL
POTASSIUM SERPL-SCNC: 4.4 MMOL/L (ref 3.4–5.3)
SODIUM SERPL-SCNC: 141 MMOL/L (ref 133–144)
TRIGL SERPL-MCNC: 158 MG/DL
TSH SERPL DL<=0.005 MIU/L-ACNC: 0.9 MU/L (ref 0.4–4)

## 2020-02-04 ASSESSMENT — ANXIETY QUESTIONNAIRES: GAD7 TOTAL SCORE: 7

## 2020-02-04 ASSESSMENT — ASTHMA QUESTIONNAIRES: ACT_TOTALSCORE: 25

## 2020-02-05 DIAGNOSIS — E11.9 TYPE 2 DIABETES MELLITUS WITHOUT COMPLICATION, WITHOUT LONG-TERM CURRENT USE OF INSULIN (H): ICD-10-CM

## 2020-02-05 NOTE — TELEPHONE ENCOUNTER
"Pharmacy Comments:    \"Insurance only allows once a day testing for non-insulin dependent patients.\"    Tel: [phone]  Fax: [phone]  "

## 2020-04-29 ENCOUNTER — NURSE TRIAGE (OUTPATIENT)
Dept: FAMILY MEDICINE | Facility: CLINIC | Age: 75
End: 2020-04-29

## 2020-04-29 NOTE — TELEPHONE ENCOUNTER
vm left for patient to contact clinic to schedule a vrt visit with pcp.    .Elizabeth GERMAIN    [facility] [place]

## 2020-10-28 ENCOUNTER — NURSE TRIAGE (OUTPATIENT)
Dept: NURSING | Facility: CLINIC | Age: 75
End: 2020-10-28

## 2020-10-29 DIAGNOSIS — J45.20 MILD INTERMITTENT ASTHMA WITHOUT COMPLICATION: ICD-10-CM

## 2020-10-29 DIAGNOSIS — E78.5 HYPERLIPIDEMIA LDL GOAL <100: ICD-10-CM

## 2020-10-30 RX ORDER — SIMVASTATIN 40 MG
TABLET ORAL
Qty: 90 TABLET | Refills: 3 | OUTPATIENT
Start: 2020-10-30

## 2020-10-30 NOTE — TELEPHONE ENCOUNTER
Refill request too soon.  Patient has appointment with provider on 11/2/2020, will discuss refills at appointment.    NICOLASA Fragoso, RN  [employer] [facility]

## 2020-11-02 ENCOUNTER — OFFICE VISIT (OUTPATIENT)
Dept: FAMILY MEDICINE | Facility: CLINIC | Age: 75
End: 2020-11-02
Payer: MEDICARE

## 2020-11-02 VITALS
SYSTOLIC BLOOD PRESSURE: 98 MMHG | HEART RATE: 109 BPM | DIASTOLIC BLOOD PRESSURE: 58 MMHG | OXYGEN SATURATION: 93 % | BODY MASS INDEX: 30.48 KG/M2 | TEMPERATURE: 98.4 F | WEIGHT: 172 LBS | HEIGHT: 63 IN

## 2020-11-02 DIAGNOSIS — F51.01 PRIMARY INSOMNIA: ICD-10-CM

## 2020-11-02 DIAGNOSIS — E11.9 TYPE 2 DIABETES MELLITUS WITHOUT COMPLICATION, WITHOUT LONG-TERM CURRENT USE OF INSULIN (H): ICD-10-CM

## 2020-11-02 DIAGNOSIS — Z00.00 ENCOUNTER FOR ANNUAL WELLNESS EXAM IN MEDICARE PATIENT: Primary | ICD-10-CM

## 2020-11-02 DIAGNOSIS — J45.20 MILD INTERMITTENT ASTHMA WITHOUT COMPLICATION: ICD-10-CM

## 2020-11-02 DIAGNOSIS — Z23 NEED FOR PROPHYLACTIC VACCINATION AND INOCULATION AGAINST INFLUENZA: ICD-10-CM

## 2020-11-02 LAB — HBA1C MFR BLD: 7.5 % (ref 0–5.6)

## 2020-11-02 PROCEDURE — 90662 IIV NO PRSV INCREASED AG IM: CPT | Performed by: FAMILY MEDICINE

## 2020-11-02 PROCEDURE — G0008 ADMIN INFLUENZA VIRUS VAC: HCPCS | Performed by: FAMILY MEDICINE

## 2020-11-02 PROCEDURE — 83036 HEMOGLOBIN GLYCOSYLATED A1C: CPT | Performed by: FAMILY MEDICINE

## 2020-11-02 PROCEDURE — 36415 COLL VENOUS BLD VENIPUNCTURE: CPT | Performed by: FAMILY MEDICINE

## 2020-11-02 PROCEDURE — G0439 PPPS, SUBSEQ VISIT: HCPCS | Performed by: FAMILY MEDICINE

## 2020-11-02 PROCEDURE — 99214 OFFICE O/P EST MOD 30 MIN: CPT | Mod: 25 | Performed by: FAMILY MEDICINE

## 2020-11-02 RX ORDER — ALBUTEROL SULFATE 90 UG/1
2 AEROSOL, METERED RESPIRATORY (INHALATION) EVERY 4 HOURS PRN
Qty: 1 INHALER | Refills: 5 | Status: SHIPPED | OUTPATIENT
Start: 2020-11-02 | End: 2022-05-04

## 2020-11-02 RX ORDER — LORAZEPAM 0.5 MG/1
TABLET ORAL
Refills: 2 | COMMUNITY
Start: 2020-11-02

## 2020-11-02 RX ORDER — ZOLPIDEM TARTRATE 10 MG/1
TABLET ORAL
Qty: 30 TABLET | Refills: 0 | COMMUNITY
Start: 2020-11-02

## 2020-11-02 RX ORDER — METFORMIN HCL 500 MG
1500 TABLET, EXTENDED RELEASE 24 HR ORAL
Qty: 270 TABLET | Refills: 1 | Status: SHIPPED | OUTPATIENT
Start: 2020-11-02 | End: 2021-03-31

## 2020-11-02 ASSESSMENT — ANXIETY QUESTIONNAIRES
7. FEELING AFRAID AS IF SOMETHING AWFUL MIGHT HAPPEN: NOT AT ALL
GAD7 TOTAL SCORE: 3
2. NOT BEING ABLE TO STOP OR CONTROL WORRYING: SEVERAL DAYS
1. FEELING NERVOUS, ANXIOUS, OR ON EDGE: SEVERAL DAYS
5. BEING SO RESTLESS THAT IT IS HARD TO SIT STILL: NOT AT ALL
6. BECOMING EASILY ANNOYED OR IRRITABLE: NOT AT ALL
3. WORRYING TOO MUCH ABOUT DIFFERENT THINGS: SEVERAL DAYS

## 2020-11-02 ASSESSMENT — PATIENT HEALTH QUESTIONNAIRE - PHQ9
SUM OF ALL RESPONSES TO PHQ QUESTIONS 1-9: 3
5. POOR APPETITE OR OVEREATING: NOT AT ALL

## 2020-11-02 ASSESSMENT — MIFFLIN-ST. JEOR: SCORE: 1246.38

## 2020-11-02 NOTE — LETTER
November 3, 2020      Claribel Del Rio  [address] [place] [address]        Dear ,    I have reviewed your recent labs. Here are the results:    -A1C (test of diabetes control the last 2-3 months) is pretty stable as compared to before.  Resume current medications.  Eat a low carbohydrate diet.  Try to exercise regularly.  Please recheck your A1C test in 3 months.     Lab Results   Component Value Date    A1C 7.5 11/02/2020    A1C 7.4 02/03/2020    A1C 11.4 10/28/2019    A1C 6.7 12/10/2018    A1C 6.2 12/08/2017         If you have any questions or concerns, please call the clinic at the number listed above.       Sincerely,        Chata Wallace MD

## 2020-11-02 NOTE — PROGRESS NOTES
"Subjective     Claribel Del Rio is a 75 year old female who presents to clinic today for the following health issues:           Annual Wellness Visit    Patient has been advised of split billing requirements and indicates understanding: Yes     Are you in the first 12 months of your Medicare Part B coverage?  No    Physical Health:    In general, how would you rate your overall physical health? good    Outside of work, how many days during the week do you exercise?none    Outside of work, approximately how many minutes a day do you exercise?not applicable    If you drink alcohol do you typically have >3 drinks per day or >7 drinks per week? No    Do you usually eat at least 4 servings of fruit and vegetables a day, include whole grains & fiber and avoid regularly eating high fat or \"junk\" foods? Yes    Do you have any problems taking medications regularly? No    Do you have any side effects from medications? not applicable    Needs assistance for the following daily activities: no assistance needed    Which of the following safety concerns are present in your home?  none identified     Hearing impairment: No    In the past 6 months, have you been bothered by leaking of urine?     Mental Health:    In general, how would you rate your overall mental or emotional health? good  PHQ-2 Score:      Do you feel safe in your environment? Yes    Have you ever done Advance Care Planning? (For example, a Health Directive, POLST, or a discussion with a medical provider or your loved ones about your wishes)? Yes, advance care planning is on file.    Fall risk:  Fallen 2 or more times in the past year?: No  Any fall with injury in the past year?: No    Cognitive Screenin) Repeat 3 items (Leader, Season, Table)    2) Clock draw: NORMAL  3) 3 item recall: Recalls 3 objects  Results: 3 items recalled: COGNITIVE IMPAIRMENT LESS LIKELY    Mini-CogTM Copyright ROGER [employer] Licensed by the author for use in [facility]; " reprinted with permission (soob@.[facility]). All rights reserved.      Do you have sleep apnea, excessive snoring or daytime drowsiness?: no    Current providers sharing in care for this patient include:   Patient Care Team:  Chata Wallace MD as PCP - General (Family Practice)  Chata Wallace MD as Assigned PCP  Vickie Fry RD as Diabetes Educator (Dietitian, Registered)  Crystal Watts MD as Assigned OBGYN Provider    Patient has been advised of split billing requirements and indicates understanding:  Diabetes Follow-up    How often are you checking your blood sugar? A few times a week  What time of day are you checking your blood sugars (select all that apply)?  Before meals  Have you had any blood sugars above 200?  No  Have you had any blood sugars below 70?  No    What symptoms do you notice when your blood sugar is low?  None    What concerns do you have today about your diabetes? None     Do you have any of these symptoms? (Select all that apply)  No numbness or tingling in feet.  No redness, sores or blisters on feet.  No complaints of excessive thirst.  No reports of blurry vision.  No significant changes to weight.    Have you had a diabetic eye exam in the last 12 months?         BP Readings from Last 2 Encounters:   11/02/20 98/58   02/03/20 114/66     Hemoglobin A1C (%)   Date Value   11/02/2020 7.5 (H)   02/03/2020 7.4 (H)     LDL Cholesterol Calculated (mg/dL)   Date Value   02/03/2020 80   12/10/2018 109 (H)           Asthma Follow-Up    Was ACT completed today?    Yes    ACT Total Scores 2/3/2020   ACT TOTAL SCORE -   ASTHMA ER VISITS -   ASTHMA HOSPITALIZATIONS -   ACT TOTAL SCORE (Goal Greater than or Equal to 20) 25   In the past 12 months, how many times did you visit the emergency room for your asthma without being admitted to the hospital? 0   In the past 12 months, how many times were you hospitalized overnight because of your asthma? 0              How many days per week do you miss  "taking your asthma controller medication?  0    Please describe any recent triggers for your asthma: she states it has been so long since she has had to worry about it she doesn't remember    Have you had any Emergency Room Visits, Urgent Care Visits, or Hospital Admissions since your last office visit?  No    Review of Systems   CONSTITUTIONAL: NEGATIVE for fever, chills, change in weight  INTEGUMENTARY/SKIN: NEGATIVE for worrisome rashes, moles or lesions  EYES: NEGATIVE for vision changes or irritation  ENT/MOUTH: NEGATIVE for ear, mouth and throat problems  RESP: NEGATIVE for significant cough or SOB  BREAST: NEGATIVE for masses, tenderness or discharge  CV: NEGATIVE for chest pain, palpitations or peripheral edema  GI: NEGATIVE for nausea, abdominal pain, heartburn, or change in bowel habits  : NEGATIVE for frequency, dysuria, or hematuria  MUSCULOSKELETAL: NEGATIVE for significant arthralgias or myalgia  NEURO: NEGATIVE for weakness, dizziness or paresthesias  ENDOCRINE: NEGATIVE for temperature intolerance, skin/hair changes  HEME: NEGATIVE for bleeding problems  PSYCHIATRIC: NEGATIVE for changes in mood or affect      Objective    BP 98/58 (BP Location: Left arm, Cuff Size: Adult Regular)   Pulse 109   Temp 98.4  F (36.9  C) (Tympanic)   Ht 1.588 m (5' 2.5\")   Wt 78 kg (172 lb)   SpO2 93%   BMI 30.96 kg/m    Body mass index is 30.96 kg/m .  Physical Exam   GENERAL: healthy, alert and no distress  EYES: Eyes grossly normal to inspection, PERRL and conjunctivae and sclerae normal  HENT: ear canals and TM's normal, nose and mouth without ulcers or lesions  NECK: no adenopathy, no asymmetry, masses, or scars and thyroid normal to palpation  RESP: lungs clear to auscultation - no rales, rhonchi or wheezes  CV: regular rate and rhythm, normal S1 S2, no S3 or S4, no murmur, click or rub, no peripheral edema and peripheral pulses strong  ABDOMEN: soft, nontender, no hepatosplenomegaly, no masses and bowel " "sounds normal  MS: no gross musculoskeletal defects noted, no edema  SKIN: no suspicious lesions or rashes  NEURO: Normal strength and tone, mentation intact and speech normal  PSYCH: mentation appears normal, affect normal/bright            Assessment & Plan     Encounter for annual wellness exam in Medicare patient      Mild intermittent asthma without complication  Well managed.  Refill on medications ordered.  - fluticasone-salmeterol (ADVAIR DISKUS) 500-50 MCG/DOSE inhaler; TAKE 1 PUFF BY MOUTH TWICE A DAY  - albuterol (PROAIR HFA/PROVENTIL HFA/VENTOLIN HFA) 108 (90 Base) MCG/ACT inhaler; Inhale 2 puffs into the lungs every 4 hours as needed for shortness of breath / dyspnea or wheezing    Type 2 diabetes mellitus without complication, without long-term current use of insulin (H)  Results for orders placed or performed in visit on 11/02/20   Hemoglobin A1c     Status: Abnormal   Result Value Ref Range    Hemoglobin A1C 7.5 (H) 0 - 5.6 %     Diabetic control is stable as compared to before.  Resume current medications without any changes.  Follow-up in 3 months for recheck  - Hemoglobin A1c  - metFORMIN (GLUCOPHAGE-XR) 500 MG 24 hr tablet; Take 3 tablets (1,500 mg) by mouth daily (with dinner)    Primary insomnia  Resume Ambien.  Refill ordered.  Patient takes 10 mg and tolerates it well without any side effects.  - zolpidem (AMBIEN) 10 MG tablet; 1 TABLET AT BEDTIME    Need for prophylactic vaccination and inoculation against influenza    - FLUZONE HIGH DOSE 65+  [94139]     BMI:   Estimated body mass index is 30.96 kg/m  as calculated from the following:    Height as of this encounter: 1.588 m (5' 2.5\").    Weight as of this encounter: 78 kg (172 lb).            Return in about 13 weeks (around 2/1/2021) for Cholesterol Recheck, with Dr. Wallace in office.    Chata Wallace MD  [facility]           "

## 2020-11-02 NOTE — LETTER
November 2, 2020      Claribel Del Rio  [address] [place] [address]        Dear ,    I have reviewed your recent labs. Here are the results:    -A1C (test of diabetes control the last 2-3 months) is 7.5, which is not much different as compared to the last check.  I would recommend to resume current medications.  Eat a low carbohydrate diet.    Please continue with your current plan. Also, you should make an appointment to see me and recheck your A1C test in 3 months.             Resulted Orders   Hemoglobin A1c   Result Value Ref Range    Hemoglobin A1C 7.5 (H) 0 - 5.6 %      Comment:      Normal <5.7% Prediabetes 5.7-6.4%  Diabetes 6.5% or higher - adopted from ADA   consensus guidelines.         If you have any questions or concerns, please call the clinic at the number listed above.       Sincerely,        Chata Wallace MD

## 2020-11-03 ASSESSMENT — ASTHMA QUESTIONNAIRES: ACT_TOTALSCORE: 24

## 2020-11-03 ASSESSMENT — ANXIETY QUESTIONNAIRES: GAD7 TOTAL SCORE: 3

## 2020-11-16 ENCOUNTER — TRANSFERRED RECORDS (OUTPATIENT)
Dept: HEALTH INFORMATION MANAGEMENT | Facility: CLINIC | Age: 75
End: 2020-11-16

## 2020-11-16 LAB — RETINOPATHY: POSITIVE

## 2020-11-29 DIAGNOSIS — K21.9 GASTROESOPHAGEAL REFLUX DISEASE WITHOUT ESOPHAGITIS: ICD-10-CM

## 2020-11-29 DIAGNOSIS — E78.5 HYPERLIPIDEMIA LDL GOAL <100: ICD-10-CM

## 2020-11-30 RX ORDER — SIMVASTATIN 40 MG
TABLET ORAL
Qty: 90 TABLET | Refills: 3 | OUTPATIENT
Start: 2020-11-30

## 2020-11-30 NOTE — TELEPHONE ENCOUNTER
Requesting too soon. Should have enough medication to last until 2/3/21.  11/02/2020 Chata Wallace MD Office Visit  Return in about 13 weeks (around 2/1/2021) for Cholesterol Recheck, with Dr. Wallace in office.

## 2020-12-02 NOTE — TELEPHONE ENCOUNTER
Patient confirmed that she does not need a refill at this time. She states that she remembers that Dr. Wallace wanted her to return 2/1/21. Patient did not want to schedule this early. Anahi Calzada RN

## 2020-12-27 DIAGNOSIS — E11.9 TYPE 2 DIABETES MELLITUS WITHOUT COMPLICATION, WITHOUT LONG-TERM CURRENT USE OF INSULIN (H): ICD-10-CM

## 2021-02-10 ENCOUNTER — TRANSFERRED RECORDS (OUTPATIENT)
Dept: HEALTH INFORMATION MANAGEMENT | Facility: CLINIC | Age: 76
End: 2021-02-10

## 2021-02-24 DIAGNOSIS — E78.5 HYPERLIPIDEMIA LDL GOAL <100: ICD-10-CM

## 2021-02-24 RX ORDER — SIMVASTATIN 40 MG
TABLET ORAL
Qty: 90 TABLET | Refills: 0 | Status: SHIPPED | OUTPATIENT
Start: 2021-02-24 | End: 2021-03-31

## 2021-02-24 NOTE — TELEPHONE ENCOUNTER
Patient due for fasting office visit- 90 days supply given.  Routing to team to schedule appointment     Araceli GIRON RN  [facility]  [phone]

## 2021-03-05 ENCOUNTER — IMMUNIZATION (OUTPATIENT)
Dept: NURSING | Facility: CLINIC | Age: 76
End: 2021-03-05
Payer: MEDICARE

## 2021-03-05 PROCEDURE — 91300 PR COVID VAC PFIZER DIL RECON 30 MCG/0.3 ML IM: CPT

## 2021-03-05 PROCEDURE — 0001A PR COVID VAC PFIZER DIL RECON 30 MCG/0.3 ML IM: CPT

## 2021-03-26 ENCOUNTER — IMMUNIZATION (OUTPATIENT)
Dept: NURSING | Facility: CLINIC | Age: 76
End: 2021-03-26
Attending: INTERNAL MEDICINE
Payer: MEDICARE

## 2021-03-26 PROCEDURE — 91300 PR COVID VAC PFIZER DIL RECON 30 MCG/0.3 ML IM: CPT

## 2021-03-26 PROCEDURE — 0002A PR COVID VAC PFIZER DIL RECON 30 MCG/0.3 ML IM: CPT

## 2021-03-31 ENCOUNTER — OFFICE VISIT (OUTPATIENT)
Dept: FAMILY MEDICINE | Facility: CLINIC | Age: 76
End: 2021-03-31
Payer: MEDICARE

## 2021-03-31 VITALS
HEART RATE: 80 BPM | SYSTOLIC BLOOD PRESSURE: 108 MMHG | TEMPERATURE: 97.7 F | WEIGHT: 167 LBS | BODY MASS INDEX: 30.73 KG/M2 | DIASTOLIC BLOOD PRESSURE: 64 MMHG | RESPIRATION RATE: 16 BRPM | HEIGHT: 62 IN | OXYGEN SATURATION: 96 %

## 2021-03-31 DIAGNOSIS — K58.1 IRRITABLE BOWEL SYNDROME WITH CONSTIPATION: ICD-10-CM

## 2021-03-31 DIAGNOSIS — Z00.00 ANNUAL PHYSICAL EXAM: Primary | ICD-10-CM

## 2021-03-31 DIAGNOSIS — K21.9 GASTROESOPHAGEAL REFLUX DISEASE WITHOUT ESOPHAGITIS: ICD-10-CM

## 2021-03-31 DIAGNOSIS — F32.0 MILD SINGLE CURRENT EPISODE OF MAJOR DEPRESSIVE DISORDER (H): ICD-10-CM

## 2021-03-31 DIAGNOSIS — E78.5 HYPERLIPIDEMIA LDL GOAL <100: ICD-10-CM

## 2021-03-31 DIAGNOSIS — E11.9 TYPE 2 DIABETES MELLITUS WITHOUT COMPLICATION, WITHOUT LONG-TERM CURRENT USE OF INSULIN (H): ICD-10-CM

## 2021-03-31 DIAGNOSIS — J45.20 MILD INTERMITTENT ASTHMA WITHOUT COMPLICATION: ICD-10-CM

## 2021-03-31 LAB
ALBUMIN SERPL-MCNC: 3.9 G/DL (ref 3.4–5)
ALP SERPL-CCNC: 92 U/L (ref 40–150)
ALT SERPL W P-5'-P-CCNC: 27 U/L (ref 0–50)
ANION GAP SERPL CALCULATED.3IONS-SCNC: 3 MMOL/L (ref 3–14)
AST SERPL W P-5'-P-CCNC: 21 U/L (ref 0–45)
BILIRUB SERPL-MCNC: 0.7 MG/DL (ref 0.2–1.3)
BUN SERPL-MCNC: 28 MG/DL (ref 7–30)
CALCIUM SERPL-MCNC: 9.4 MG/DL (ref 8.5–10.1)
CHLORIDE SERPL-SCNC: 110 MMOL/L (ref 94–109)
CHOLEST SERPL-MCNC: 158 MG/DL
CO2 SERPL-SCNC: 29 MMOL/L (ref 20–32)
CREAT SERPL-MCNC: 0.78 MG/DL (ref 0.52–1.04)
GFR SERPL CREATININE-BSD FRML MDRD: 75 ML/MIN/{1.73_M2}
GLUCOSE SERPL-MCNC: 119 MG/DL (ref 70–99)
HBA1C MFR BLD: 6.9 % (ref 0–5.6)
HDLC SERPL-MCNC: 46 MG/DL
HGB BLD-MCNC: 13.7 G/DL (ref 11.7–15.7)
LDLC SERPL CALC-MCNC: 79 MG/DL
NONHDLC SERPL-MCNC: 112 MG/DL
POTASSIUM SERPL-SCNC: 3.8 MMOL/L (ref 3.4–5.3)
PROT SERPL-MCNC: 7.4 G/DL (ref 6.8–8.8)
SODIUM SERPL-SCNC: 142 MMOL/L (ref 133–144)
TRIGL SERPL-MCNC: 163 MG/DL

## 2021-03-31 PROCEDURE — 80053 COMPREHEN METABOLIC PANEL: CPT | Performed by: FAMILY MEDICINE

## 2021-03-31 PROCEDURE — 83036 HEMOGLOBIN GLYCOSYLATED A1C: CPT | Performed by: FAMILY MEDICINE

## 2021-03-31 PROCEDURE — 36415 COLL VENOUS BLD VENIPUNCTURE: CPT | Performed by: FAMILY MEDICINE

## 2021-03-31 PROCEDURE — 85018 HEMOGLOBIN: CPT | Performed by: FAMILY MEDICINE

## 2021-03-31 PROCEDURE — 80061 LIPID PANEL: CPT | Performed by: FAMILY MEDICINE

## 2021-03-31 PROCEDURE — 99214 OFFICE O/P EST MOD 30 MIN: CPT | Mod: 25 | Performed by: FAMILY MEDICINE

## 2021-03-31 PROCEDURE — 82043 UR ALBUMIN QUANTITATIVE: CPT | Performed by: FAMILY MEDICINE

## 2021-03-31 PROCEDURE — G0439 PPPS, SUBSEQ VISIT: HCPCS | Performed by: FAMILY MEDICINE

## 2021-03-31 PROCEDURE — 99207 PR FOOT EXAM NO CHARGE: CPT | Performed by: FAMILY MEDICINE

## 2021-03-31 RX ORDER — SIMVASTATIN 40 MG
40 TABLET ORAL AT BEDTIME
Qty: 90 TABLET | Refills: 3 | Status: SHIPPED | OUTPATIENT
Start: 2021-03-31 | End: 2022-03-23

## 2021-03-31 RX ORDER — METFORMIN HCL 500 MG
1500 TABLET, EXTENDED RELEASE 24 HR ORAL
Qty: 270 TABLET | Refills: 3 | Status: SHIPPED | OUTPATIENT
Start: 2021-03-31 | End: 2022-03-23

## 2021-03-31 RX ORDER — MONTELUKAST SODIUM 10 MG/1
TABLET ORAL
Qty: 90 TABLET | Refills: 3 | Status: SHIPPED | OUTPATIENT
Start: 2021-03-31 | End: 2022-03-23

## 2021-03-31 ASSESSMENT — ANXIETY QUESTIONNAIRES
7. FEELING AFRAID AS IF SOMETHING AWFUL MIGHT HAPPEN: MORE THAN HALF THE DAYS
IF YOU CHECKED OFF ANY PROBLEMS ON THIS QUESTIONNAIRE, HOW DIFFICULT HAVE THESE PROBLEMS MADE IT FOR YOU TO DO YOUR WORK, TAKE CARE OF THINGS AT HOME, OR GET ALONG WITH OTHER PEOPLE: SOMEWHAT DIFFICULT
2. NOT BEING ABLE TO STOP OR CONTROL WORRYING: SEVERAL DAYS
5. BEING SO RESTLESS THAT IT IS HARD TO SIT STILL: NOT AT ALL
6. BECOMING EASILY ANNOYED OR IRRITABLE: NOT AT ALL
3. WORRYING TOO MUCH ABOUT DIFFERENT THINGS: SEVERAL DAYS
1. FEELING NERVOUS, ANXIOUS, OR ON EDGE: SEVERAL DAYS
GAD7 TOTAL SCORE: 6

## 2021-03-31 ASSESSMENT — PATIENT HEALTH QUESTIONNAIRE - PHQ9
SUM OF ALL RESPONSES TO PHQ QUESTIONS 1-9: 7
5. POOR APPETITE OR OVEREATING: SEVERAL DAYS

## 2021-03-31 ASSESSMENT — ACTIVITIES OF DAILY LIVING (ADL): CURRENT_FUNCTION: NO ASSISTANCE NEEDED

## 2021-03-31 ASSESSMENT — MIFFLIN-ST. JEOR: SCORE: 1219.73

## 2021-03-31 NOTE — PROGRESS NOTES
"SUBJECTIVE:   Claribel Del Rio is a 75 year old female who presents for Preventive Visit.      Patient has been advised of split billing requirements and indicates understanding: Yes   Are you in the first 12 months of your Medicare coverage?  No    Healthy Habits:     In general, how would you rate your overall health?  Good    Frequency of exercise:  None    Do you usually eat at least 4 servings of fruit and vegetables a day, include whole grains    & fiber and avoid regularly eating high fat or \"junk\" foods?  No    Taking medications regularly:  Yes    Barriers to taking medications:  None    Medication side effects:  None    Ability to successfully perform activities of daily living:  No assistance needed    Home Safety:  No safety concerns identified    Hearing Impairment:  No hearing concerns    In the past 6 months, have you been bothered by leaking of urine?  No    In general, how would you rate your overall mental or emotional health?  Good      PHQ-2 Total Score: 2    Additional concerns today:  No    Do you feel safe in your environment? Yes    Have you ever done Advance Care Planning? (For example, a Health Directive, POLST, or a discussion with a medical provider or your loved ones about your wishes): No, advance care planning information given to patient to review.  Advanced care planning was discussed at today's visit.       Fall risk  Fallen 2 or more times in the past year?: No  Any fall with injury in the past year?: No    Cognitive Screening   1) Repeat 3 items (Leader, Season, Table)    2) Clock draw: NORMAL  3) 3 item recall: Recalls 3 objects  Results: 3 items recalled: COGNITIVE IMPAIRMENT LESS LIKELY    Mini-CogTM Copyright ROGER De La O. Licensed by the author for use in [facility]; reprinted with permission ([email or web address]). All rights reserved.      Do you have sleep apnea, excessive snoring or daytime drowsiness?: yes    Reviewed and updated as needed this visit by clinical " staff  Tobacco  Allergies  Meds   Med Hx  Surg Hx  Fam Hx  Soc Hx        Reviewed and updated as needed this visit by Provider   Allergies              Social History     Tobacco Use     Smoking status: Former Smoker     Packs/day: 0.00     Years: 6.00     Pack years: 0.00     Types: Cigarettes     Quit date: 1974     Years since quittin.8     Smokeless tobacco: Never Used     Tobacco comment: Previous smoker, 1-1.5 packs per week   Substance Use Topics     Alcohol use: Yes     Alcohol/week: 4.0 - 6.0 standard drinks     Types: 4 - 6 Standard drinks or equivalent per week     Frequency: Never     Binge frequency: Never     Comment: 1 drnk monthly     If you drink alcohol do you typically have >3 drinks per day or >7 drinks per week? No    Alcohol Use 3/31/2021   Prescreen: >3 drinks/day or >7 drinks/week? No           Diabetes Follow-up    How often are you checking your blood sugar? One time daily  What time of day are you checking your blood sugars (select all that apply)?  At bedtime  Have you had any blood sugars above 200?  No  Have you had any blood sugars below 70?  No    What symptoms do you notice when your blood sugar is low?  None    What concerns do you have today about your diabetes? None     Do you have any of these symptoms? (Select all that apply)  No numbness or tingling in feet.  No redness, sores or blisters on feet.  No complaints of excessive thirst.  No reports of blurry vision.  No significant changes to weight.      BP Readings from Last 2 Encounters:   21 108/64   20 98/58     Hemoglobin A1C (%)   Date Value   2021 6.9 (H)   2020 7.5 (H)     LDL Cholesterol Calculated (mg/dL)   Date Value   2020 80   12/10/2018 109 (H)   GERD: Patient is taking omeprazole daily.  She would like to continue the medication use regularly.    History of IBS.  Reports that in the past she had diarrhea.  Now she is experiencing more constipation.  Wonders what to do  about it.  No medications tried so far.      Hyperlipidemia Follow-Up      Are you regularly taking any medication or supplement to lower your cholesterol?   Yes- statin    Are you having muscle aches or other side effects that you think could be caused by your cholesterol lowering medication?  No    Depression Followup    How are you doing with your depression since your last visit? No change    Are you having other symptoms that might be associated with depression? No    Have you had a significant life event?  No     Are you feeling anxious or having panic attacks?   No    Do you have any concerns with your use of alcohol or other drugs? No    Social History     Tobacco Use     Smoking status: Former Smoker     Packs/day: 0.00     Years: 6.00     Pack years: 0.00     Types: Cigarettes     Quit date: 1974     Years since quittin.8     Smokeless tobacco: Never Used     Tobacco comment: Previous smoker, 1-1.5 packs per week   Substance Use Topics     Alcohol use: Yes     Alcohol/week: 4.0 - 6.0 standard drinks     Types: 4 - 6 Standard drinks or equivalent per week     Frequency: Never     Binge frequency: Never     Comment: 1 drnk monthly     Drug use: No     PHQ 2/3/2020 2020 3/31/2021   PHQ-9 Total Score 1 3 7   Q9: Thoughts of better off dead/self-harm past 2 weeks Not at all Not at all Not at all     TRINA-7 SCORE 2/3/2020 2020 3/31/2021   Total Score - - -   Total Score 7 3 6         Suicide Assessment Five-step Evaluation and Treatment (SAFE-T)    Asthma Follow-Up    Was ACT completed today?    Yes    ACT Total Scores 3/31/2021   ACT TOTAL SCORE -   ASTHMA ER VISITS -   ASTHMA HOSPITALIZATIONS -   ACT TOTAL SCORE (Goal Greater than or Equal to 20) -   In the past 12 months, how many times did you visit the emergency room for your asthma without being admitted to the hospital? 0   In the past 12 months, how many times were you hospitalized overnight because of your asthma? 0          How many  days per week do you miss taking your asthma controller medication?  0    Please describe any recent triggers for your asthma: None    Have you had any Emergency Room Visits, Urgent Care Visits, or Hospital Admissions since your last office visit?  No      Current providers sharing in care for this patient include:   Patient Care Team:  Chata Wallace MD as PCP - General (Family Practice)  Chata Wallace MD as Assigned PCP  Vickie Fry RD as Diabetes Educator (Dietitian, Registered)  Crystal Watts MD as Assigned OBGYN Provider    The following health maintenance items are reviewed in Epic and correct as of today:  Health Maintenance Due   Topic Date Due     ZOSTER IMMUNIZATION (1 of 2) Never done     ASTHMA ACTION PLAN  08/30/2018     Patient Active Problem List   Diagnosis     Obesity     Fibromyalgia     Advanced directives, counseling/discussion     Allergic rhinitis     Low back pain     MIRZA (obstructive sleep apnea)- on CPAP     Mild intermittent asthma without complication     Status post total right knee replacement     Type 2 diabetes mellitus without complication (H)     Hyperlipidemia LDL goal <100     Mild single current episode of major depressive disorder (H)     Osteopenia     Mild intermittent asthma, unspecified whether complicated     Past Surgical History:   Procedure Laterality Date     AMB ESOPHAGEAL MANOMETRY  10/2007    Normal     APPENDECTOMY  1992     ARTHROPLASTY KNEE Right 6/7/2016    Procedure: ARTHROPLASTY KNEE;  Surgeon: Jose Alberto Gomez MD;  Location: [facility] OR     CARPAL TUNNEL RELEASE RT/LT  05/02, 08/02    right 5/2002, left 8/2002     COLONOSCOPY  4/2009    Normal; repeat in 10 years     COLONOSCOPY N/A 11/4/2015    Procedure: COLONOSCOPY;  Surgeon: Cathie Melendez MD;  Location:  GI     EXCHANGE POLY COMPONENT ARTHROPLASTY KNEE Right 9/5/2017    Procedure: EXCHANGE POLY COMPONENT ARTHROPLASTY KNEE;  1.  Right knee exploration and limited scar tissue excision.   2.   Tibial polyethylene insert exchange (after removal of the original polyethylene component and following the posterior cruciate ligament resection, a deep dish polyethylene #3 of 9 mm thickness was placed)     ;  Surgeon: Jose Alberto Gomez MD;  Location: [facility] OR     HYSTERECTOMY TOTAL ABDOMINAL, BILATERAL SALPINGO-OOPHORECTOMY, COMBINED  1992    Fibroids, endometriosis, Dr Roass     JOINT REPLACEMENT  2010    bilateral thumb     PUNC/ASPIR BREAST CYST  2004     TONSILLECTOMY  1950       Social History     Tobacco Use     Smoking status: Former Smoker     Packs/day: 0.00     Years: 6.00     Pack years: 0.00     Types: Cigarettes     Quit date: 1974     Years since quittin.8     Smokeless tobacco: Never Used     Tobacco comment: Previous smoker, 1-1.5 packs per week   Substance Use Topics     Alcohol use: Yes     Alcohol/week: 4.0 - 6.0 standard drinks     Types: 4 - 6 Standard drinks or equivalent per week     Frequency: Never     Binge frequency: Never     Comment: 1 gisellek monthly     Family History   Problem Relation Age of Onset     Hypertension Father      Blood Disease Father         DVT's     Eye Disorder Father      Colon Cancer Father      Colon Cancer Paternal Uncle      Asthma Sister      C.A.D. Paternal Grandfather      Diabetes Maternal Aunt      Colon Cancer Maternal Uncle      Hyperlipidemia Paternal Grandmother      Hypertension Paternal Grandmother      Hyperlipidemia Other         Aunt     Hypertension Maternal Grandfather      Hypertension Other         Aunt     Breast Cancer No family hx of      Anesthesia Reaction No family hx of      Osteoporosis No family hx of      Thyroid Disease No family hx of          Current Outpatient Medications   Medication Sig Dispense Refill     albuterol (PROAIR HFA/PROVENTIL HFA/VENTOLIN HFA) 108 (90 Base) MCG/ACT inhaler Inhale 2 puffs into the lungs every 4 hours as needed for shortness of breath / dyspnea or wheezing 1 Inhaler 5     ARIPiprazole  (ABILIFY) 2 MG tablet        blood glucose (CONTOUR NEXT TEST) test strip USE TO TEST BLOOD SUGAR ONCE DAILY OR AS DIRECTED. 100 strip 3     blood glucose monitoring (EMILY MICROLET) lancets Use to test blood sugar 2 times daily or as directed.  Ok to substitute alternative if insurance prefers. 100 each 3     calcium carbonate (OS-NETO) 1500 (600 Ca) MG tablet Take 1 tablet (600 mg) by mouth 2 times daily (with meals)       escitalopram (LEXAPRO) 20 MG tablet Take 1 tablet (20 mg) by mouth daily 30 tablet 1     fexofenadine-pseudoePHEDrine (ALLEGRA-D)  MG per tablet Take 1 tablet by mouth 2 times daily as needed        fluticasone-salmeterol (ADVAIR DISKUS) 500-50 MCG/DOSE inhaler TAKE 1 PUFF BY MOUTH TWICE A DAY 1 each 5     LORazepam (ATIVAN) 0.5 MG tablet TAKE 1 TABLET BY MOUTH  DAILY prn  2     MELATONIN PO Take 5 mg by mouth       metFORMIN (GLUCOPHAGE-XR) 500 MG 24 hr tablet Take 3 tablets (1,500 mg) by mouth daily (with dinner) 270 tablet 3     montelukast (SINGULAIR) 10 MG tablet TAKE 1 TABLET(10 MG) BY MOUTH AT BEDTIME 90 tablet 3     omeprazole (PRILOSEC) 20 MG DR capsule Take 1 capsule (20 mg) by mouth daily 90 capsule 3     simvastatin (ZOCOR) 40 MG tablet Take 1 tablet (40 mg) by mouth At Bedtime 90 tablet 3     vitamin D3 (CHOLECALCIFEROL) 2000 units (50 mcg) tablet Take 1 tablet (2,000 Units) by mouth daily       zolpidem (AMBIEN) 10 MG tablet 1 TABLET AT BEDTIME 30 tablet 0     Allergies   Allergen Reactions     Ivp Dye [Contrast Dye] Shortness Of Breath     Claritin [Loratadine]      Hallucinations               Review of Systems  CONSTITUTIONAL: NEGATIVE for fever, chills, change in weight  INTEGUMENTARY/SKIN: NEGATIVE for worrisome rashes, moles or lesions  EYES: NEGATIVE for vision changes or irritation  ENT/MOUTH: NEGATIVE for ear, mouth and throat problems  RESP: NEGATIVE for significant cough or SOB  BREAST: NEGATIVE for masses, tenderness or discharge  CV: NEGATIVE for chest pain,  "palpitations or peripheral edema  GI: NEGATIVE for nausea, abdominal pain, heartburn, or change in bowel habits  : NEGATIVE for frequency, dysuria, or hematuria  MUSCULOSKELETAL: NEGATIVE for significant arthralgias or myalgia  NEURO: NEGATIVE for weakness, dizziness or paresthesias  ENDOCRINE: NEGATIVE for temperature intolerance, skin/hair changes  HEME: NEGATIVE for bleeding problems  PSYCHIATRIC: NEGATIVE for changes in mood or affect    OBJECTIVE:   /64   Pulse 80   Temp 97.7  F (36.5  C)   Resp 16   Ht 1.581 m (5' 2.25\")   Wt 75.8 kg (167 lb)   SpO2 96%   BMI 30.30 kg/m   Estimated body mass index is 30.3 kg/m  as calculated from the following:    Height as of this encounter: 1.581 m (5' 2.25\").    Weight as of this encounter: 75.8 kg (167 lb).  Physical Exam  GENERAL APPEARANCE: healthy, alert and no distress  EYES: Eyes grossly normal to inspection, PERRL and conjunctivae and sclerae normal  HENT: ear canals and TM's normal, nose and mouth without ulcers or lesions, oropharynx clear and oral mucous membranes moist  NECK: no adenopathy, no asymmetry, masses, or scars and thyroid normal to palpation  RESP: lungs clear to auscultation - no rales, rhonchi or wheezes  BREAST: normal without masses, tenderness or nipple discharge and no palpable axillary masses or adenopathy  CV: regular rate and rhythm, normal S1 S2, no S3 or S4, no murmur, click or rub, no peripheral edema and peripheral pulses strong  ABDOMEN: soft, nontender, no hepatosplenomegaly, no masses and bowel sounds normal  MS: no musculoskeletal defects are noted and gait is age appropriate without ataxia  SKIN: no suspicious lesions or rashes  NEURO: Normal strength and tone, sensory exam grossly normal, mentation intact and speech normal  PSYCH: mentation appears normal and affect normal/bright  Foot exam: Normal follow-up filament test bilaterally.  No skin break.  Normal capillary refill.  No calluses    Results for orders placed " or performed in visit on 03/31/21   Hemoglobin     Status: None   Result Value Ref Range    Hemoglobin 13.7 11.7 - 15.7 g/dL   Hemoglobin A1c     Status: Abnormal   Result Value Ref Range    Hemoglobin A1C 6.9 (H) 0 - 5.6 %         ASSESSMENT / PLAN:   1. Annual physical exam    - Hemoglobin    2. Type 2 diabetes mellitus without complication, without long-term current use of insulin (H)  Diabetes is well controlled.  Resume current medications  - blood glucose (CONTOUR NEXT TEST) test strip; USE TO TEST BLOOD SUGAR ONCE DAILY OR AS DIRECTED.  Dispense: 100 strip; Refill: 3  - metFORMIN (GLUCOPHAGE-XR) 500 MG 24 hr tablet; Take 3 tablets (1,500 mg) by mouth daily (with dinner)  Dispense: 270 tablet; Refill: 3  - Hemoglobin A1c  - Albumin Random Urine Quantitative with Creat Ratio  - FOOT EXAM    3. Hyperlipidemia LDL goal <100  Previously well controlled.  Questionable control at this time.  It has been over a year since the last check.  Simvastatin refilled.  Await the results    - simvastatin (ZOCOR) 40 MG tablet; Take 1 tablet (40 mg) by mouth At Bedtime  Dispense: 90 tablet; Refill: 3  - Comprehensive metabolic panel  - Lipid panel reflex to direct LDL Fasting    4. Gastroesophageal reflux disease without esophagitis  Symptoms well managed with use of omeprazole.  Patient would like to continue the medication use.  - omeprazole (PRILOSEC) 20 MG DR capsule; Take 1 capsule (20 mg) by mouth daily  Dispense: 90 capsule; Refill: 3    5. Mild intermittent asthma without complication  Symptoms well controlled  - fluticasone-salmeterol (ADVAIR DISKUS) 500-50 MCG/DOSE inhaler; TAKE 1 PUFF BY MOUTH TWICE A DAY  Dispense: 1 each; Refill: 5  - montelukast (SINGULAIR) 10 MG tablet; TAKE 1 TABLET(10 MG) BY MOUTH AT BEDTIME  Dispense: 90 tablet; Refill: 3    6. Mild single current episode of major depressive disorder (H)  Symptoms are slightly worsened.  Patient is seeing psychiatry for management    7. Irritable bowel  "syndrome with constipation  Recommending to increase fluid and fiber intake.  Recommending fiber supplement like Metamucil to see if that helps.  Otherwise she could always use MiraLAX every 2 to 3 days to help her stay regulated.  Did talk to her about stool softeners as well as probiotics.  Patient verbalized understanding and agreement      Patient has been advised of split billing requirements and indicates understanding:   COUNSELING:  Reviewed preventive health counseling, as reflected in patient instructions       Regular exercise       Healthy diet/nutrition    Estimated body mass index is 30.3 kg/m  as calculated from the following:    Height as of this encounter: 1.581 m (5' 2.25\").    Weight as of this encounter: 75.8 kg (167 lb).    Weight management plan: Discussed healthy diet and exercise guidelines    She reports that she quit smoking about 46 years ago. Her smoking use included cigarettes. She smoked 0.00 packs per day for 6.00 years. She has never used smokeless tobacco.      Appropriate preventive services were discussed with this patient, including applicable screening as appropriate for cardiovascular disease, diabetes, osteopenia/osteoporosis, and glaucoma.  As appropriate for age/gender, discussed screening for colorectal cancer, prostate cancer, breast cancer, and cervical cancer. Checklist reviewing preventive services available has been given to the patient.    Reviewed patients plan of care and provided an AVS. The Intermediate Care Plan ( asthma action plan, low back pain action plan, and migraine action plan) for Claribel meets the Care Plan requirement. This Care Plan has been established and reviewed with the Patient.    Counseling Resources:  ATP IV Guidelines  Pooled Cohorts Equation Calculator  Breast Cancer Risk Calculator  Breast Cancer: Medication to Reduce Risk  FRAX Risk Assessment  ICSI Preventive Guidelines  Dietary Guidelines for Americans, 2010  USDA's MyPlate  ASA " Prophylaxis  Lung CA Screening    Chata Wallace MD  [facility]    Identified Health Risks:

## 2021-03-31 NOTE — PROGRESS NOTES
"SUBJECTIVE:   Claribel Del Rio is a 75 year old female who presents for Preventive Visit.      Patient has been advised of split billing requirements and indicates understanding: Yes   Are you in the first 12 months of your Medicare coverage?  No    Healthy Habits:     In general, how would you rate your overall health?  Good    Frequency of exercise:  None    Do you usually eat at least 4 servings of fruit and vegetables a day, include whole grains    & fiber and avoid regularly eating high fat or \"junk\" foods?  No    Taking medications regularly:  Yes    Barriers to taking medications:  None    Medication side effects:  None    Ability to successfully perform activities of daily living:  No assistance needed    Home Safety:  No safety concerns identified    Hearing Impairment:  No hearing concerns    In the past 6 months, have you been bothered by leaking of urine?  No    In general, how would you rate your overall mental or emotional health?  Good      PHQ-2 Total Score: 0    Additional concerns today:  No    Do you feel safe in your environment? Yes    Have you ever done Advance Care Planning? (For example, a Health Directive, POLST, or a discussion with a medical provider or your loved ones about your wishes): No, advance care planning information given to patient to review.  Advanced care planning was discussed at today's visit.    {Hearing Test Done (Optional):649161}   Fall risk  Fallen 2 or more times in the past year?: No  Any fall with injury in the past year?: No    Cognitive Screening   1) Repeat 3 items (Leader, Season, Table)    2) Clock draw: NORMAL  3) 3 item recall: Recalls 3 objects  Results: 3 items recalled: COGNITIVE IMPAIRMENT LESS LIKELY    Mini-CogTM Copyright ROGER De La O. Licensed by the author for use in [facility]; reprinted with permission ([email or web address]). All rights reserved.      Do you have sleep apnea, excessive snoring or daytime drowsiness?: yes    Reviewed and updated as " needed this visit by clinical staff  Tobacco  Allergies  Meds   Med Hx  Surg Hx  Fam Hx  Soc Hx        Reviewed and updated as needed this visit by Provider                Social History     Tobacco Use     Smoking status: Former Smoker     Packs/day: 0.00     Years: 6.00     Pack years: 0.00     Types: Cigarettes     Quit date: 1974     Years since quittin.8     Smokeless tobacco: Never Used     Tobacco comment: Previous smoker, 1-1.5 packs per week   Substance Use Topics     Alcohol use: Yes     Alcohol/week: 4.0 - 6.0 standard drinks     Types: 4 - 6 Standard drinks or equivalent per week     Frequency: Never     Binge frequency: Never     Comment: 1 drnk monthly     If you drink alcohol do you typically have >3 drinks per day or >7 drinks per week? No    Alcohol Use 2017   Prescreen: >3 drinks/day or >7 drinks/week? The patient does not drink >3 drinks per day nor >7 drinks per week.   {add AUDIT responses (Optional) (A score of 7 for adult men is an indication of hazardous drinking; a score of 8 or more is an indication of an alcohol use disorder.  A score of 7 or more for adult women is an indication of hazardous drinking or an alchohol use disorder):022461}        {additional problems to add (Optional):139629}    Current providers sharing in care for this patient include:   Patient Care Team:  Chata Wallace MD as PCP - General (Family Practice)  Chata Wallace MD as Assigned PCP  Vickie Fry RD as Diabetes Educator (Dietitian, Registered)  Crystal Watts MD as Assigned OBGYN Provider    The following health maintenance items are reviewed in Epic and correct as of today:  Health Maintenance Due   Topic Date Due     ZOSTER IMMUNIZATION (1 of 2) Never done     ASTHMA ACTION PLAN  2018     DIABETIC FOOT EXAM  12/10/2019     BMP  2021     LIPID  2021     MICROALBUMIN  2021     {Chronicprobdata (optional):140311}  {Decision Support  "(Optional):348687}        Review of Systems  {ROS COMP (Optional):427205}    OBJECTIVE:   There were no vitals taken for this visit. Estimated body mass index is 30.96 kg/m  as calculated from the following:    Height as of 20: 1.588 m (5' 2.5\").    Weight as of 20: 78 kg (172 lb).  Physical Exam  {Exam (Optional) :109640}    {Diagnostic Test Results (Optional):686349::\"Diagnostic Test Results:\",\"Labs reviewed in Epic\"}    ASSESSMENT / PLAN:   {Diag Picklist:087747}    Patient has been advised of split billing requirements and indicates understanding: {YES / NO:592130::\"Yes\"}  COUNSELING:  {Medicare Counselin}    Estimated body mass index is 30.96 kg/m  as calculated from the following:    Height as of 20: 1.588 m (5' 2.5\").    Weight as of 20: 78 kg (172 lb).    {Weight Management Plan (ACO) Complete if BMI is abnormal-  Ages 18-64  BMI >24.9.  Age 65+ with BMI <23 or >30 (Optional):935058}    She reports that she quit smoking about 46 years ago. Her smoking use included cigarettes. She smoked 0.00 packs per day for 6.00 years. She has never used smokeless tobacco.      Appropriate preventive services were discussed with this patient, including applicable screening as appropriate for cardiovascular disease, diabetes, osteopenia/osteoporosis, and glaucoma.  As appropriate for age/gender, discussed screening for colorectal cancer, prostate cancer, breast cancer, and cervical cancer. Checklist reviewing preventive services available has been given to the patient.    Reviewed patients plan of care and provided an AVS. The {CarePlan:716420} for Claribel meets the Care Plan requirement. This Care Plan has been established and reviewed with the {PATIENT, FAMILY MEMBER, CAREGIVER:126797}.    Counseling Resources:  ATP IV Guidelines  Pooled Cohorts Equation Calculator  Breast Cancer Risk Calculator  Breast Cancer: Medication to Reduce Risk  FRAX Risk Assessment  ICSI Preventive Guidelines  Dietary " Guidelines for Americans, 2010  USDA's MyPlate  ASA Prophylaxis  Lung CA Screening    Chata Wallace MD  [facility]    Identified Health Risks:

## 2021-04-01 LAB
CREAT UR-MCNC: 142 MG/DL
MICROALBUMIN UR-MCNC: 10 MG/L
MICROALBUMIN/CREAT UR: 7.39 MG/G CR (ref 0–25)

## 2021-04-01 ASSESSMENT — ASTHMA QUESTIONNAIRES: ACT_TOTALSCORE: 24

## 2021-04-01 ASSESSMENT — ANXIETY QUESTIONNAIRES: GAD7 TOTAL SCORE: 6

## 2021-08-10 ENCOUNTER — TRANSFERRED RECORDS (OUTPATIENT)
Dept: HEALTH INFORMATION MANAGEMENT | Facility: CLINIC | Age: 76
End: 2021-08-10

## 2021-10-01 ENCOUNTER — OFFICE VISIT (OUTPATIENT)
Dept: FAMILY MEDICINE | Facility: CLINIC | Age: 76
End: 2021-10-01
Payer: MEDICARE

## 2021-10-01 VITALS
DIASTOLIC BLOOD PRESSURE: 62 MMHG | SYSTOLIC BLOOD PRESSURE: 120 MMHG | OXYGEN SATURATION: 95 % | WEIGHT: 165 LBS | BODY MASS INDEX: 30.36 KG/M2 | HEIGHT: 62 IN | HEART RATE: 76 BPM

## 2021-10-01 DIAGNOSIS — J45.20 MILD INTERMITTENT ASTHMA WITHOUT COMPLICATION: ICD-10-CM

## 2021-10-01 DIAGNOSIS — E11.9 TYPE 2 DIABETES MELLITUS WITHOUT COMPLICATION, WITHOUT LONG-TERM CURRENT USE OF INSULIN (H): Primary | ICD-10-CM

## 2021-10-01 DIAGNOSIS — Z23 NEED FOR PROPHYLACTIC VACCINATION AND INOCULATION AGAINST INFLUENZA: ICD-10-CM

## 2021-10-01 LAB — HBA1C MFR BLD: 6.6 % (ref 0–5.6)

## 2021-10-01 PROCEDURE — 90662 IIV NO PRSV INCREASED AG IM: CPT | Performed by: FAMILY MEDICINE

## 2021-10-01 PROCEDURE — 36415 COLL VENOUS BLD VENIPUNCTURE: CPT | Performed by: FAMILY MEDICINE

## 2021-10-01 PROCEDURE — G0008 ADMIN INFLUENZA VIRUS VAC: HCPCS | Performed by: FAMILY MEDICINE

## 2021-10-01 PROCEDURE — 99214 OFFICE O/P EST MOD 30 MIN: CPT | Mod: 25 | Performed by: FAMILY MEDICINE

## 2021-10-01 PROCEDURE — 83036 HEMOGLOBIN GLYCOSYLATED A1C: CPT | Performed by: FAMILY MEDICINE

## 2021-10-01 ASSESSMENT — PATIENT HEALTH QUESTIONNAIRE - PHQ9
SUM OF ALL RESPONSES TO PHQ QUESTIONS 1-9: 3
SUM OF ALL RESPONSES TO PHQ QUESTIONS 1-9: 3
10. IF YOU CHECKED OFF ANY PROBLEMS, HOW DIFFICULT HAVE THESE PROBLEMS MADE IT FOR YOU TO DO YOUR WORK, TAKE CARE OF THINGS AT HOME, OR GET ALONG WITH OTHER PEOPLE: NOT DIFFICULT AT ALL

## 2021-10-01 ASSESSMENT — MIFFLIN-ST. JEOR: SCORE: 1205.66

## 2021-10-01 NOTE — PROGRESS NOTES
"    Assessment & Plan     1. Type 2 diabetes mellitus without complication, without long-term current use of insulin (H)  Previously well controlled.  Resume current medications without any changes.  A1c ordered.  Await the results  - Hemoglobin A1c; Future    2. Mild intermittent asthma without complication  Well-controlled.  - fluticasone-salmeterol (ADVAIR DISKUS) 500-50 MCG/DOSE inhaler; TAKE 1 PUFF BY MOUTH TWICE A DAY  Dispense: 1 each; Refill: 5    3. Need for prophylactic vaccination and inoculation against influenza    - INFLUENZA, QUAD, HIGH DOSE, PF, 65YR + (FLUZONE HD)       BMI:   Estimated body mass index is 29.94 kg/m  as calculated from the following:    Height as of this encounter: 1.581 m (5' 2.25\").    Weight as of this encounter: 74.8 kg (165 lb).       Return in about 6 months (around 4/1/2022) for Annual Physical Exam.    Chata Wallace MD  [facility] PRAIRIRIMA Sanford is a 76 year old who presents for the following health issues     HPI     Diabetes Follow-up    How often are you checking your blood sugar? One time daily  What time of day are you checking your blood sugars (select all that apply)?  At bedtime  Have you had any blood sugars above 200?  No  Have you had any blood sugars below 70?  No    What symptoms do you notice when your blood sugar is low?  None    What concerns do you have today about your diabetes? None     Do you have any of these symptoms? (Select all that apply)  No numbness or tingling in feet.  No redness, sores or blisters on feet.  No complaints of excessive thirst.  No reports of blurry vision.  No significant changes to weight.      BP Readings from Last 2 Encounters:   10/01/21 120/62   03/31/21 108/64     Hemoglobin A1C (%)   Date Value   03/31/2021 6.9 (H)   11/02/2020 7.5 (H)     LDL Cholesterol Calculated (mg/dL)   Date Value   03/31/2021 79   02/03/2020 80           How many servings of fruits and vegetables do you eat daily?  " "2-3    On average, how many sweetened beverages do you drink each day (Examples: soda, juice, sweet tea, etc.  Do NOT count diet or artificially sweetened beverages)?   0    How many days per week do you exercise enough to make your heart beat faster? 7    How many minutes a day do you exercise enough to make your heart beat faster? 30 - 60    How many days per week do you miss taking your medication? 0      Review of Systems   CONSTITUTIONAL: NEGATIVE for fever, chills, change in weight  ENT/MOUTH: NEGATIVE for ear, mouth and throat problems  RESP: NEGATIVE for significant cough or SOB  CV: NEGATIVE for chest pain, palpitations or peripheral edema      Objective    /62   Pulse 76   Ht 1.581 m (5' 2.25\")   Wt 74.8 kg (165 lb)   SpO2 95%   BMI 29.94 kg/m    Body mass index is 29.94 kg/m .  Physical Exam   GENERAL: healthy, alert and no distress  NECK: no adenopathy, no asymmetry, masses, or scars and thyroid normal to palpation  RESP: lungs clear to auscultation - no rales, rhonchi or wheezes  CV: regular rate and rhythm, normal S1 S2, no S3 or S4, no murmur, click or rub, no peripheral edema and peripheral pulses strong  ABDOMEN: soft, nontender, no hepatosplenomegaly, no masses and bowel sounds normal  MS: no gross musculoskeletal defects noted, no edema                Answers for HPI/ROS submitted by the patient on 10/1/2021  If you checked off any problems, how difficult have these problems made it for you to do your work, take care of things at home, or get along with other people?: Not difficult at all  PHQ9 TOTAL SCORE: 3      "

## 2021-10-02 ASSESSMENT — ASTHMA QUESTIONNAIRES: ACT_TOTALSCORE: 25

## 2021-11-20 DIAGNOSIS — E11.9 TYPE 2 DIABETES MELLITUS WITHOUT COMPLICATION, WITHOUT LONG-TERM CURRENT USE OF INSULIN (H): ICD-10-CM

## 2021-11-22 NOTE — TELEPHONE ENCOUNTER
Prescription approved per [facility] Refill Protocol.  Aury SERVIN RN  [facility]

## 2021-11-29 ENCOUNTER — TRANSFERRED RECORDS (OUTPATIENT)
Dept: HEALTH INFORMATION MANAGEMENT | Facility: CLINIC | Age: 76
End: 2021-11-29
Payer: MEDICARE

## 2021-11-29 LAB — RETINOPATHY: NEGATIVE

## 2021-12-21 DIAGNOSIS — K21.9 GASTROESOPHAGEAL REFLUX DISEASE WITHOUT ESOPHAGITIS: ICD-10-CM

## 2022-01-19 DIAGNOSIS — J45.20 MILD INTERMITTENT ASTHMA WITHOUT COMPLICATION: ICD-10-CM

## 2022-01-19 DIAGNOSIS — E11.9 TYPE 2 DIABETES MELLITUS WITHOUT COMPLICATION, WITHOUT LONG-TERM CURRENT USE OF INSULIN (H): ICD-10-CM

## 2022-01-19 DIAGNOSIS — K21.9 GASTROESOPHAGEAL REFLUX DISEASE WITHOUT ESOPHAGITIS: ICD-10-CM

## 2022-01-21 RX ORDER — MONTELUKAST SODIUM 10 MG/1
TABLET ORAL
Qty: 90 TABLET | Refills: 3 | OUTPATIENT
Start: 2022-01-21

## 2022-01-21 RX ORDER — METFORMIN HCL 500 MG
1500 TABLET, EXTENDED RELEASE 24 HR ORAL
Qty: 270 TABLET | Refills: 3 | OUTPATIENT
Start: 2022-01-21

## 2022-03-01 ENCOUNTER — OFFICE VISIT (OUTPATIENT)
Dept: FAMILY MEDICINE | Facility: CLINIC | Age: 77
End: 2022-03-01
Payer: MEDICARE

## 2022-03-01 VITALS
TEMPERATURE: 98.6 F | SYSTOLIC BLOOD PRESSURE: 118 MMHG | BODY MASS INDEX: 30.15 KG/M2 | WEIGHT: 166.2 LBS | OXYGEN SATURATION: 92 % | RESPIRATION RATE: 18 BRPM | HEART RATE: 92 BPM | DIASTOLIC BLOOD PRESSURE: 66 MMHG

## 2022-03-01 DIAGNOSIS — M79.662 PAIN OF LEFT LOWER LEG: Primary | ICD-10-CM

## 2022-03-01 DIAGNOSIS — E11.9 TYPE 2 DIABETES MELLITUS WITHOUT COMPLICATION, WITHOUT LONG-TERM CURRENT USE OF INSULIN (H): ICD-10-CM

## 2022-03-01 DIAGNOSIS — F32.0 MILD SINGLE CURRENT EPISODE OF MAJOR DEPRESSIVE DISORDER (H): ICD-10-CM

## 2022-03-01 DIAGNOSIS — I80.9 PHLEBITIS AND THROMBOPHLEBITIS: ICD-10-CM

## 2022-03-01 PROCEDURE — 99214 OFFICE O/P EST MOD 30 MIN: CPT | Performed by: FAMILY MEDICINE

## 2022-03-01 RX ORDER — DOXYCYCLINE 100 MG/1
100 CAPSULE ORAL 2 TIMES DAILY
Qty: 20 CAPSULE | Refills: 0 | Status: SHIPPED | OUTPATIENT
Start: 2022-03-01 | End: 2022-03-11

## 2022-03-01 ASSESSMENT — ANXIETY QUESTIONNAIRES
3. WORRYING TOO MUCH ABOUT DIFFERENT THINGS: SEVERAL DAYS
IF YOU CHECKED OFF ANY PROBLEMS ON THIS QUESTIONNAIRE, HOW DIFFICULT HAVE THESE PROBLEMS MADE IT FOR YOU TO DO YOUR WORK, TAKE CARE OF THINGS AT HOME, OR GET ALONG WITH OTHER PEOPLE: SOMEWHAT DIFFICULT
2. NOT BEING ABLE TO STOP OR CONTROL WORRYING: NOT AT ALL
6. BECOMING EASILY ANNOYED OR IRRITABLE: NOT AT ALL
5. BEING SO RESTLESS THAT IT IS HARD TO SIT STILL: NOT AT ALL
1. FEELING NERVOUS, ANXIOUS, OR ON EDGE: NOT AT ALL
GAD7 TOTAL SCORE: 2
7. FEELING AFRAID AS IF SOMETHING AWFUL MIGHT HAPPEN: SEVERAL DAYS

## 2022-03-01 ASSESSMENT — PAIN SCALES - GENERAL: PAINLEVEL: NO PAIN (0)

## 2022-03-01 ASSESSMENT — PATIENT HEALTH QUESTIONNAIRE - PHQ9
SUM OF ALL RESPONSES TO PHQ QUESTIONS 1-9: 7
5. POOR APPETITE OR OVEREATING: NOT AT ALL

## 2022-03-01 ASSESSMENT — ASTHMA QUESTIONNAIRES: ACT_TOTALSCORE: 25

## 2022-03-01 NOTE — PATIENT INSTRUCTIONS
Patient Education     Superficial Thrombophlebitis   The superficial veins are the veins near the surface of the skin. Superficial thrombophlebitis is a problem that occurs when one or more of these veins become red, irritated, and swollen. This is most often because of a blood clot.   Causes  The problem may occur after injury to a vein. It may also occur after having an intravenous (IV) line placed. Other factors that can make the problem more likely include:     Varicose veins    Venous insufficiency    Bleeding disorders    Prolonged periods of rest and not moving around    IV drug abuse    Pregnancy    Use of birth control pills or estrogen therapy  Symptoms  Symptoms may appear in the affected area. They can include:    Pain    Tenderness    Redness    Warmth    Swelling    Hardening of the vein  In most cases, superficial thrombophlebitis resolves on its own with no problems. Treatment is focused on relieving symptoms.   Sometimes, there is a risk that the deep veins in the body may also be involved. This can lead to more serious problems. In such cases, further testing and treatments may be needed. Your healthcare provider can tell you more about this.   Home care  To help relieve pain and swelling, you may be told to:    Apply heat or cold to the affected area. Do this for up to 10 minutes as often as directed.  ? Heat: Use a warm compress, such as a heating pad.  ? Cold: Use a cold compress, such as a cold pack or bag of ice wrapped in a thin towel.    Take nonsteroidal anti-inflammatory drugs (NSAIDS), such as ibuprofen. In some cases, other pain medicines may be prescribed.    Keep the affected limb (arm or leg) raised above heart level as directed.    Wear elastic compression stockings or bandages as directed.    Don't sit or stand for long periods. Get up and walk often.  To help treat a blood clot, a blood thinner (anticoagulant) may be prescribed. If this is needed, be sure to take the medicine  exactly as directed.   Follow-up care  Follow up with your healthcare provider as advised. If imaging tests are done, they will be reviewed by a doctor. You ll be told the results and any new findings that may affect your treatment.   When to seek medical advice  Call your healthcare provider right away if any of these occur:    Fever of 100.4 F (38 C) or higher, or as directed by your healthcare provider    Increasing pain, swelling, or tenderness in the affected area    Spreading warmth or redness in the affected area  Call 911  Call 911 if any of these occur:     Trouble breathing    Chest pain or discomfort that worsens with deep breathing or coughing    Coughing (may cough up blood)    Fast or irregular heartbeat    Sweating    Anxiety    Lightheadedness, dizziness, or fainting    Extreme confusion    Extreme drowsiness or trouble waking up    New pain in the chest, arm, shoulder, neck, or upper back  [employer] last reviewed this educational content on 4/1/2018 2000-2021 [employer]. All rights reserved. This information is not intended as a substitute for professional medical care. Always follow your healthcare professional's instructions.           Patient Education     Understanding Deep Vein Thrombosis  Deep vein thrombosis (DVT) is a condition in which a blood clot or thrombus forms in a deep vein. A blood clot is most common in the leg, but can develop in a large vein deep inside the leg, arm, or other part of the body. Part of the clot called an embolus can separate from the vein. It may travel to the lungs and form a pulmonary embolus (PE). This can cut off blood flow to part of the lung or to the entire lung. A PE is a medical emergency and may cause death. Healthcare providers use the term venous thromboembolism (VTE) to describe the two conditions, DVT and PE. They use the term VTE because the two conditions are very closely related. And, because their prevention and treatment are  closely related.   Over time, a blood clot can also permanently damage veins. To protect your health, a blood clot must be treated right away.   How DVT develops  The deep veins of the legs are located in the muscles. These help carry blood from the legs to the heart. When leg muscles contract and relax, blood is squeezed through the veins back to the heart. One-way valves inside the veins help keep the blood moving in the right direction. When blood moves too slowly or not at all, it can pool in the veins. This makes a clot more likely to form.      When a muscle contracts, the valve opens. Blood is squeezed toward the heart. When blood moves slowly in a vein, a clot (thrombus) can form. A part of the clot can break off and travel in the bloodstream (embolus).   Risk factors  Anyone can develop a blood clot. The following risk factors make a blood clot more likely to happen:     Being inactive for a long period, such as when you re in the hospital, or traveling by plane or car    Injury to a vein from an accident, a broken bone, or surgery    Having blood clots in the past    Personal or family history of a blood-clotting disorder    Recent surgery    Cancer and certain cancer treatments    Smoking  Other factors can also put you at higher risk for a blood clot. They include:    Age over 60 years    Pregnancy    Taking birth control or hormone replacement    Having other vein problems    Being overweight  Common symptoms  A blood clot does not always cause obvious symptoms. If you do have symptoms, they usually happen suddenly. They may include:     Pain, especially deep in the muscle    Swelling    Aching or tenderness    Red or warm skin    Fever  Call your healthcare provider if you have these symptoms.  Symptoms of pulmonary embolism may include:    Trouble breathing    Fast heartbeat    Chest pain    Sweating    Coughing (may cough up blood)    Fainting  Call 911 if you have these symptoms.    If you take  medicine to help prevent blood clots, you have an increased risk of bleeding. Call 911 if you have heavy or uncontrolled bleeding. Call your healthcare provider if you have other signs or symptoms of bleeding like blood in the urine, bleeding with bowel movements, or bleeding from the nose, gums, a cut, or vagina.   Diagnosing DVT  Your healthcare provider will start with questions about your symptoms and health history along with a physical exam.   Diagnostic tests include:    An imaging test called a duplex ultrasound. This test uses sound waves to create pictures of veins and blood flow.    Blood tests to check for clotting and other problems.  If your healthcare provider thinks you may have pulmonary embolism, additional testing will be done.   Treating DVT  Treating a blood clot may include:    Medicine to thin the blood and prevent pulmonary embolism and other complications.    Staying in the hospital. This may or may not be necessary.    Surgery for some people, like those who can't take blood-thinning medicines.  Preventing DVT  Many people who are in the hospital are at an increased risk of developing blood clots. So, preventing blood clots is an important part of in-hospital care. The care may include getting out of bed regularly, taking medicine, or using special therapies or devices. Other factors and conditions may increase the risk of blood clots. Review your risk with your healthcare provider.   [employer] last reviewed this educational content on 12/1/2019 2000-2021 [employer]. All rights reserved. This information is not intended as a substitute for professional medical care. Always follow your healthcare professional's instructions.

## 2022-03-01 NOTE — PROGRESS NOTES
Assessment & Plan     (M79.662) Pain of left lower leg  (primary encounter diagnosis)  Comment: calf   Plan: US Lower Extremity Venous Duplex Left            (I80.9) Phlebitis and thrombophlebitis  Comment: left leg /  suspect superficial,, but cannot r/o DVT   Plan: US Lower Extremity Venous Duplex Left,         doxycycline hyclate (VIBRAMYCIN) 100 MG capsule          LEFT CALF PAIN X  5 days- HAS SOME SUPERFICIAL PHLEBITIS / BUT NEED TO  R/O DVT / ALSO HAS FAMILY HX OF DVT IN DAD    Discussed cares and concerns, talked bout  potential superficial thrombophlebitis/  DVT concerns and potential  risk and complications related to DVT .     So I do recommend she should proceed with the leg ultrasound to rule out DVT.  She is reluctant to drive to [facility], but she plans to call a friend and will try to schedule later today or tomorrow.    She is also debating considering going to follow to [address] [place] [address], which is closer and more convenient for her.   Also started doxycycline for her, as she does feel tender and has, mild redness and erythema, likely related to superficial phlebitis/also concerned with may be a little bit of a cellulitis.   Talked about warning S/S for which should be seen urgently.  She verbalized understanding  Cares and symptomatic treatment discussed.  She will follow up if problem       (F32.0) Mild single current episode of major depressive disorder (H)  Comment: reviewed her PHQ/ TRINA score-   Plan: she is comfortable watching. will schedule f/u with PCP soon if needed       (E11.9) Type 2 diabetes mellitus without complication, without long-term current use of insulin (H)  Comment: she thins she is doing well.   Plan: she  will schedule f/u with PCP soon as she will be due soon      .Cares and  treatment discussed.  follow up if problem   Patient expressed understanding and agreement with treatment plan. All patient's questions were answered, will let me know if has more  "later.  Medications: Rx's: Reviewed the potential side effects/complications of medications prescribed.     BMI:   Estimated body mass index is 30.15 kg/m  as calculated from the following:    Height as of 10/1/21: 1.581 m (5' 2.25\").    Weight as of this encounter: 75.4 kg (166 lb 3.2 oz).       Rocío Ardon MD  [facility] CHRISTELLE Sanford is a 76 year old who presents for the following health issues     History of Present Illness     Reason for visit:  Leg pain  Symptom onset:  3-7 days ago  Symptoms include:  Leg pain  Symptom intensity:  Moderate  Symptom progression:  Staying the same  Had these symptoms before:  No  What makes it worse:  Walking  What makes it better:  Sitting    She eats 0-1 servings of fruits and vegetables daily.She consumes 0 sweetened beverage(s) daily.She exercises with enough effort to increase her heart rate 9 or less minutes per day.  She exercises with enough effort to increase her heart rate 3 or less days per week.   She is taking medications regularly.       Left leg pain by calf / x 5 days, feels may slightly swollen,  and has couple of red spot  With minimal redness and feels tender.. No injury or injury strain no previous leg problem  . She Always has back problem but it is not any different,. Pain is mild,   Just feel tender and also hurts to walk .   Denies any chest pain palpitation shortness of breath dizziness etc..  Denies any previous history of any similar problem/blood clot.  But has family history of DVT in her dad.  She denies any other additional risk factor, if any recent illnesses, long drive or  Flight, trauma etc.    She has known history of diabetes, but she think her blood sugars have been well controlled.  She is due for a follow-up check coming up soon in the next few weeks.   Has issues with anxiety, although she thinks she is doing well and no new concerns.     Review of Systems   Constitutional, HEENT, " cardiovascular, pulmonary, GI, , musculoskeletal, neuro, skin, endocrine and psych systems are negative, except as otherwise noted.      Objective    /66   Pulse 92   Temp 98.6  F (37  C) (Tympanic)   Resp 18   Wt 75.4 kg (166 lb 3.2 oz)   SpO2 92%   BMI 30.15 kg/m    Body mass index is 30.15 kg/m .  Physical Exam   GENERAL: healthy, alert and no distress  RESP: lungs clear to auscultation - no rales, rhonchi or wheezes  CV: regular rate and rhythm, normal S1 S2, no S3 or S4,   ABDOMEN: soft, nontender, no hepatosplenomegaly, no masses and bowel sounds normal  MS/ SKIN: left leg - knee and ankle with normal range of motion no palpable tenderness on the joints.  She does have prominent varicose veins, and has some localized couple of spots/ on distal calf , and one proximal part a little below popliteal area, that feels like swollen  superficial veins, that feel mildly tender to palpation  and also has some overlying erythema and warmth to that , also does report some diffuse palpable discomfort in the calf, homans' sign -negative.  NEURO: Normal strength and tone, mentation intact and speech normal  PSYCH: mentation appears normal, affect normal

## 2022-03-02 ENCOUNTER — HOSPITAL ENCOUNTER (OUTPATIENT)
Dept: ULTRASOUND IMAGING | Facility: CLINIC | Age: 77
Discharge: HOME | End: 2022-03-02
Attending: FAMILY MEDICINE | Admitting: FAMILY MEDICINE
Payer: MEDICARE

## 2022-03-02 ENCOUNTER — TELEPHONE (OUTPATIENT)
Dept: FAMILY MEDICINE | Facility: CLINIC | Age: 77
End: 2022-03-02
Payer: MEDICARE

## 2022-03-02 ENCOUNTER — TELEPHONE (OUTPATIENT)
Dept: OTHER | Facility: CLINIC | Age: 77
End: 2022-03-02
Payer: MEDICARE

## 2022-03-02 DIAGNOSIS — Z82.49 FAMILY HISTORY OF DVT: ICD-10-CM

## 2022-03-02 DIAGNOSIS — I80.9 PHLEBITIS AND THROMBOPHLEBITIS: Primary | ICD-10-CM

## 2022-03-02 DIAGNOSIS — M79.662 PAIN OF LEFT LOWER LEG: ICD-10-CM

## 2022-03-02 DIAGNOSIS — I80.9 PHLEBITIS AND THROMBOPHLEBITIS: ICD-10-CM

## 2022-03-02 DIAGNOSIS — Z86.718 PERSONAL HISTORY OF DVT (DEEP VEIN THROMBOSIS): ICD-10-CM

## 2022-03-02 DIAGNOSIS — I80.9 THROMBOPHLEBITIS: Primary | ICD-10-CM

## 2022-03-02 DIAGNOSIS — I82.5Z9 CHRONIC DEEP VEIN THROMBOSIS (DVT) OF DISTAL VEIN OF LOWER EXTREMITY, UNSPECIFIED LATERALITY (H): ICD-10-CM

## 2022-03-02 PROCEDURE — 93971 EXTREMITY STUDY: CPT | Mod: LT

## 2022-03-02 ASSESSMENT — ANXIETY QUESTIONNAIRES: GAD7 TOTAL SCORE: 2

## 2022-03-02 NOTE — TELEPHONE ENCOUNTER
----- Message from Rocío Ardon MD sent at 3/2/2022  2:35 PM CST -----  Please contact pt to inform as well   See results note   Referral placed , so make sure to schedule appointment soon to further evaluate

## 2022-03-02 NOTE — TELEPHONE ENCOUNTER
Spoke with patient and she would like to know if she can be seen virtually.      Crystal will speak with the patient as she has questions that I cannot address.

## 2022-03-02 NOTE — TELEPHONE ENCOUNTER
appt note:   Referred to [facility] by Rocío Ardon MD for:  sympomatic superficail phlebitis/ athough incidentally noted Chronic dvt ( asymptomatic) / has fam hx of dvt- debating need for treatment/ further evalauation                Pt needs to be scheduled for d-dimer (non-fasting lab) and new patient consult with with Vascular Medicine.   May be with Marina Estes PA-C    Will route to scheduling to coordinate an appointment in the next couple of days.    Crystal Aguirre, LANCEN, RN  [facility] [place]

## 2022-03-02 NOTE — TELEPHONE ENCOUNTER
"  Hi Jennifer     here is your u/s report      1. Acute appearing superficial venous thrombus in the left proximal  calf that corelate with your symptoms - so continue with treatment plan as we discussed for this      2.  Incidentally also noted \"Chronic appearing DVT in the left distal superficial femoral vein\"  (so this is not causing any symptoms )- but not sure how long he might have this? (As I recall mention that she had no previous history of any blood clot in the deep veins , but you have a family history of blood clot in your dad)  Typically for the DVT, we do recommend taking blood thinner, if it is acute.   Although because this is chronic, not sure if you will need to be on any blood thinner at this point or not?   Definitely need to continue with aspirin for now, but it is a good idea to maybe have you see a vascular medicine specialist, maybe you  might need to do some further testing , to see if we  can find an underlying cause that can cause problems with the DVT.   Referral placed , so make sure to schedule appointment soon to further evaluate      Please discuss this with your PCP as well   Thank you for allowing me to be involved in your health care and for choosing [place].  If you have any questions or concerns please feel free to contact me, [phone] .   Written by Rocío Ardon MD on 3/2/2022  2:35 PM CST    "

## 2022-03-03 ENCOUNTER — LAB (OUTPATIENT)
Dept: LAB | Facility: CLINIC | Age: 77
End: 2022-03-03
Payer: MEDICARE

## 2022-03-03 ENCOUNTER — VIRTUAL VISIT (OUTPATIENT)
Dept: OTHER | Facility: CLINIC | Age: 77
End: 2022-03-03
Attending: INTERNAL MEDICINE
Payer: MEDICARE

## 2022-03-03 DIAGNOSIS — I82.512 CHRONIC DEEP VEIN THROMBOSIS (DVT) OF FEMORAL VEIN OF LEFT LOWER EXTREMITY (H): ICD-10-CM

## 2022-03-03 DIAGNOSIS — I80.9 THROMBOPHLEBITIS: ICD-10-CM

## 2022-03-03 DIAGNOSIS — E11.9 TYPE 2 DIABETES MELLITUS WITHOUT COMPLICATION, WITHOUT LONG-TERM CURRENT USE OF INSULIN (H): ICD-10-CM

## 2022-03-03 DIAGNOSIS — I83.893 VARICOSE VEINS OF BILATERAL LOWER EXTREMITIES WITH OTHER COMPLICATIONS: ICD-10-CM

## 2022-03-03 DIAGNOSIS — Z86.718 PERSONAL HISTORY OF DVT (DEEP VEIN THROMBOSIS): ICD-10-CM

## 2022-03-03 DIAGNOSIS — I80.3 THROMBOPHLEBITIS LEG: Primary | ICD-10-CM

## 2022-03-03 LAB
ANION GAP SERPL CALCULATED.3IONS-SCNC: 6 MMOL/L (ref 3–14)
BASOPHILS # BLD AUTO: 0 10E3/UL (ref 0–0.2)
BASOPHILS NFR BLD AUTO: 1 %
BUN SERPL-MCNC: 24 MG/DL (ref 7–30)
CALCIUM SERPL-MCNC: 9.3 MG/DL (ref 8.5–10.1)
CHLORIDE BLD-SCNC: 107 MMOL/L (ref 94–109)
CO2 SERPL-SCNC: 27 MMOL/L (ref 20–32)
CREAT SERPL-MCNC: 0.83 MG/DL (ref 0.52–1.04)
D DIMER PPP FEU-MCNC: 0.74 UG/ML FEU (ref 0–0.5)
EOSINOPHIL # BLD AUTO: 0.2 10E3/UL (ref 0–0.7)
EOSINOPHIL NFR BLD AUTO: 3 %
ERYTHROCYTE [DISTWIDTH] IN BLOOD BY AUTOMATED COUNT: 13.8 % (ref 10–15)
GFR SERPL CREATININE-BSD FRML MDRD: 74 ML/MIN/1.73M2
GLUCOSE BLD-MCNC: 269 MG/DL (ref 70–99)
HCT VFR BLD AUTO: 41.6 % (ref 35–47)
HGB BLD-MCNC: 13.3 G/DL (ref 11.7–15.7)
LYMPHOCYTES # BLD AUTO: 1.5 10E3/UL (ref 0.8–5.3)
LYMPHOCYTES NFR BLD AUTO: 28 %
MCH RBC QN AUTO: 28.7 PG (ref 26.5–33)
MCHC RBC AUTO-ENTMCNC: 32 G/DL (ref 31.5–36.5)
MCV RBC AUTO: 90 FL (ref 78–100)
MONOCYTES # BLD AUTO: 0.3 10E3/UL (ref 0–1.3)
MONOCYTES NFR BLD AUTO: 5 %
NEUTROPHILS # BLD AUTO: 3.5 10E3/UL (ref 1.6–8.3)
NEUTROPHILS NFR BLD AUTO: 63 %
PLATELET # BLD AUTO: 231 10E3/UL (ref 150–450)
POTASSIUM BLD-SCNC: 4.2 MMOL/L (ref 3.4–5.3)
RBC # BLD AUTO: 4.63 10E6/UL (ref 3.8–5.2)
SODIUM SERPL-SCNC: 140 MMOL/L (ref 133–144)
WBC # BLD AUTO: 5.5 10E3/UL (ref 4–11)

## 2022-03-03 PROCEDURE — 80048 BASIC METABOLIC PNL TOTAL CA: CPT

## 2022-03-03 PROCEDURE — 99204 OFFICE O/P NEW MOD 45 MIN: CPT | Mod: 95 | Performed by: INTERNAL MEDICINE

## 2022-03-03 PROCEDURE — 85025 COMPLETE CBC W/AUTO DIFF WBC: CPT

## 2022-03-03 PROCEDURE — 36415 COLL VENOUS BLD VENIPUNCTURE: CPT

## 2022-03-03 PROCEDURE — 85379 FIBRIN DEGRADATION QUANT: CPT

## 2022-03-03 RX ORDER — ASPIRIN 325 MG
325 TABLET ORAL DAILY
COMMUNITY
End: 2022-05-04

## 2022-03-03 NOTE — Clinical Note
Crystal,     Please mail AVS and compression stockings RX to patient home address.    Thank you   LG

## 2022-03-03 NOTE — PATIENT INSTRUCTIONS
1.  Your recent leg ultrasound revealed chronic appearing deep venous thrombosis in the left distal femoral vein and also acute appearing superficial vein thrombosis and thrombophlebitis near the left calf area.    I will initiate Xarelto 20 mg daily and take with food for next 6 weeks, new prescription sent.    Stop aspirin while you are taking the Xarelto    2.  Utilize compression stockings 20 to 30 mmHg knee-high daytime and elevate the legs    3.  Tight control of the diabetes    4.  Continue rest of the medications same    5.  Follow-up with me in 6 weeks and decide at that time venous competency studies etc

## 2022-03-03 NOTE — PROGRESS NOTES
Claribel is a 76 year old who is being evaluated via a billable video visit.      How would you like to obtain your AVS? MyChart  If the video visit is dropped, the invitation should be resent by: Text to cell phone: [phone]  Will anyone else be joining your video visit? No  Angélica Hernandez MA      Provider visit note:    Chief complaint:  New patient visit for evaluation and management of left lower extremity superficial vein thrombosis with ongoing pain for the last 1 week.    History of present illness:  This is a very pleasant 76-year-old female with past medical history for diabetes, seasonal allergic rhinitis, hyperlipidemia, MIRZA uses CPAP machine and also mild intermittent asthma and depression experiencing left lower extremity pain discomfort red erythematous and tender to touch for the last 1 week.  She was seen and evaluated by primary care physician yesterday underwent lower extremity venous duplex which revealed chronic appearing distal left superficial vein thrombosis and also acute appearing branch of varicose vein left upper calf area.  She denies any fever, chills.  She does not recall previous DVTs or PEs and never been on blood thinners.  Non-smoker and no personal history of malignancy.  She is up-to-date with age-appropriate and gender appropriate cancer screening.  She was initiated aspirin and doxycycline by her primary yesterday    Today she underwent CBC which was unremarkable, BMP labs normal renal function except elevated glucose 269 and this was drawn within an hour after her meals.    She is new to this clinic reviewed available records in the epic and updated chart    Review of systems: Reviewed all 12 point review of systems as per HPI otherwise unremarkable    Physical exam:( no physical exam done this is virtual visit)  Video virtual exam left lower extremity varicose veins with red erythematous  She also has a other leg varicose veins  (For full details please see Dr. Duglas west  care physicians office evaluation done on March 2, 2022)    Reviewed recent laboratory tests, imaging studies in the epic and updated chart  VENOUS ULTRASOUND LEFT LEG  3/2/2022 11:16 AM      HISTORY: LEFT CALF PAIN X  5 days- HAS SOME PHLEBITIS / BUT NEED TO   R/O DVT / ALSO HAS FAMILY HX OF DVT IN DAD; Pain of left lower leg;  Phlebitis and thrombophlebitis     COMPARISON: None.     FINDINGS:  Examination of the deep veins with graded compression and  color flow Doppler with spectral wave form analysis was performed.   There is chronic appearing DVT in the left distal superficial femoral  vein. Remaining deep veins of the left lower extremity are patent.     There is acute appearing superficial venous thrombus in varicose veins  in the left proximal calf. This correlates with the patient's pain.                                                                      IMPRESSION:   1. Chronic appearing DVT in the left distal superficial femoral vein.      2. Acute appearing superficial venous thrombus in the left proximal  calf as above.     GABINO TREVIÑO MD     Reviewed todays lab results  with patient       Assessment and plan:    1.  Left LE Superficail vein thrombosis near calf and Thrombophlebitis  2. Varicose veins of bilateral lower extremities with other complications  3. Chronic deep vein thrombosis (DVT) of femoral vein of left lower extremity (H)    Very pleasant 76-year-old female developed left lower extremity pain discomfort, redness and erythema for the last 1 week seen and evaluated at primary care physician's office underwent venous duplex yesterday results as delineated above chronic appearing distal left common femoral vein DVT and also acute appearing superficial vein thrombosis branch of varicose veins near left calf area.  No recent travel, non-smoker, no personal history of malignancy.  She was never treated or never aware of left lower extremity DVT.  She received Pfizer Covid vaccine series  of 3 last one on September 29, 2021.  No recent history of Covid infection.  Yesterday she was treated with doxycycline and also aspirin by primary MD.  Given history of a chronic DVT and now with acute superficial vein thrombosis and thrombophlebitis it is reasonable to treat 6 weeks of anticoagulation.      At present my recommendations,  Start Xarelto 20 mg daily with food new prescription sent  Stop aspirin while on Xarelto  Utilize compression stockings 20 to 30 mmHg knee-high and elevate the legs  Follow-up in the clinic in 6 weeks and at that time decide obtaining bilateral lower extremity venous competency studies  Undergo age, gender appropriate cancer screening  Continue rest of the medications same and tight control of the diabetes  AVS with written instructions and compression stockings prescription will be mailed to the patient      - rivaroxaban ANTICOAGULANT (XARELTO ANTICOAGULANT) 20 MG TABS tablet; Take 1 tablet (20 mg) by mouth daily (with dinner)  Dispense: 30 tablet; Refill: 1  - Compression Sleeve/Stocking Order for DME - ONLY FOR DME        4. Type 2 diabetes mellitus without complication, without long-term current use of insulin (H)    Her random 1 hour postprandial blood sugar was 269 but recent A1c was good range  Follow diet, exercise and monitor blood sugars      Video Visit Details    Type of Service: Video Visit    Video Start Time: 3:00 PM     She was offered and given office appointment today but patient preferred video visit which was accommodated    45 minutes spent on the date of the encounter doing chart review, history, virtual video exam, documentation and further activities as noted above.  She is new to this clinic, reviewed extensive records in epic and updated chart    Originating Location (patient location): Home     Distant Location (provider location): [facility]    Mode of Communication:  Video Conference via [employer]      This visit is being conducted as a virtual visit due  to the emphasis on mitigation of the COVID-19 virus pandemic. The clinician has decided that the risk of an in-office visit outweighs the benefit for this patient.     Thank you for the consultation !  This note was dictated by utilizing Dragon software    Copy of this note to primary care physician    Ronit Weiss MD, FAKALPESH, FSVM, FNLA, [facility]  Vascular Medicine  Clinical lipidologist  Clinical hypertension specialist

## 2022-03-07 NOTE — PROGRESS NOTES
Mailed AVS & compression stockings Rx to patient's home address per Dr. Weiss .    Crystal Aguirre RN BSN  [facility]  [phone]

## 2022-03-21 DIAGNOSIS — K21.9 GASTROESOPHAGEAL REFLUX DISEASE WITHOUT ESOPHAGITIS: ICD-10-CM

## 2022-03-21 DIAGNOSIS — E78.5 HYPERLIPIDEMIA LDL GOAL <100: ICD-10-CM

## 2022-03-21 DIAGNOSIS — E11.9 TYPE 2 DIABETES MELLITUS WITHOUT COMPLICATION, WITHOUT LONG-TERM CURRENT USE OF INSULIN (H): ICD-10-CM

## 2022-03-21 DIAGNOSIS — J45.20 MILD INTERMITTENT ASTHMA WITHOUT COMPLICATION: ICD-10-CM

## 2022-03-23 RX ORDER — SIMVASTATIN 40 MG
TABLET ORAL
Qty: 90 TABLET | Refills: 2 | Status: SHIPPED | OUTPATIENT
Start: 2022-03-23 | End: 2022-11-17

## 2022-03-23 RX ORDER — METFORMIN HCL 500 MG
1500 TABLET, EXTENDED RELEASE 24 HR ORAL
Qty: 270 TABLET | Refills: 1 | Status: SHIPPED | OUTPATIENT
Start: 2022-03-23 | End: 2022-08-19

## 2022-03-23 RX ORDER — MONTELUKAST SODIUM 10 MG/1
TABLET ORAL
Qty: 90 TABLET | Refills: 1 | Status: SHIPPED | OUTPATIENT
Start: 2022-03-23 | End: 2022-07-21

## 2022-04-13 ENCOUNTER — OFFICE VISIT (OUTPATIENT)
Dept: OTHER | Facility: CLINIC | Age: 77
End: 2022-04-13
Attending: INTERNAL MEDICINE
Payer: MEDICARE

## 2022-04-13 ENCOUNTER — LAB (OUTPATIENT)
Dept: LAB | Facility: CLINIC | Age: 77
End: 2022-04-13
Attending: INTERNAL MEDICINE
Payer: MEDICARE

## 2022-04-13 VITALS
HEART RATE: 85 BPM | WEIGHT: 164.2 LBS | SYSTOLIC BLOOD PRESSURE: 115 MMHG | BODY MASS INDEX: 30.22 KG/M2 | DIASTOLIC BLOOD PRESSURE: 69 MMHG | OXYGEN SATURATION: 96 % | HEIGHT: 62 IN

## 2022-04-13 DIAGNOSIS — I83.893 VARICOSE VEINS OF BILATERAL LOWER EXTREMITIES WITH OTHER COMPLICATIONS: ICD-10-CM

## 2022-04-13 DIAGNOSIS — I82.512 CHRONIC DEEP VEIN THROMBOSIS (DVT) OF FEMORAL VEIN OF LEFT LOWER EXTREMITY (H): ICD-10-CM

## 2022-04-13 DIAGNOSIS — I80.3 THROMBOPHLEBITIS LEG: Primary | ICD-10-CM

## 2022-04-13 DIAGNOSIS — I80.3 THROMBOPHLEBITIS LEG: ICD-10-CM

## 2022-04-13 DIAGNOSIS — R82.998 BROWN-COLORED URINE: ICD-10-CM

## 2022-04-13 DIAGNOSIS — E11.9 TYPE 2 DIABETES MELLITUS WITHOUT COMPLICATION, WITHOUT LONG-TERM CURRENT USE OF INSULIN (H): ICD-10-CM

## 2022-04-13 LAB
ALBUMIN UR-MCNC: 10 MG/DL
APPEARANCE UR: CLEAR
BASOPHILS # BLD AUTO: 0 10E3/UL (ref 0–0.2)
BASOPHILS NFR BLD AUTO: 1 %
BILIRUB UR QL STRIP: NEGATIVE
CAOX CRY #/AREA URNS HPF: ABNORMAL /HPF
COLOR UR AUTO: YELLOW
D DIMER PPP FEU-MCNC: 0.34 UG/ML FEU (ref 0–0.5)
EOSINOPHIL # BLD AUTO: 0.1 10E3/UL (ref 0–0.7)
EOSINOPHIL NFR BLD AUTO: 2 %
ERYTHROCYTE [DISTWIDTH] IN BLOOD BY AUTOMATED COUNT: 12.9 % (ref 10–15)
GLUCOSE UR STRIP-MCNC: 300 MG/DL
HCT VFR BLD AUTO: 43.4 % (ref 35–47)
HGB BLD-MCNC: 13.8 G/DL (ref 11.7–15.7)
HGB UR QL STRIP: NEGATIVE
IMM GRANULOCYTES # BLD: 0 10E3/UL
IMM GRANULOCYTES NFR BLD: 0 %
KETONES UR STRIP-MCNC: ABNORMAL MG/DL
LEUKOCYTE ESTERASE UR QL STRIP: NEGATIVE
LYMPHOCYTES # BLD AUTO: 2.1 10E3/UL (ref 0.8–5.3)
LYMPHOCYTES NFR BLD AUTO: 30 %
MCH RBC QN AUTO: 28.5 PG (ref 26.5–33)
MCHC RBC AUTO-ENTMCNC: 31.8 G/DL (ref 31.5–36.5)
MCV RBC AUTO: 90 FL (ref 78–100)
MONOCYTES # BLD AUTO: 0.5 10E3/UL (ref 0–1.3)
MONOCYTES NFR BLD AUTO: 7 %
MUCOUS THREADS #/AREA URNS LPF: PRESENT /LPF
NEUTROPHILS # BLD AUTO: 4.2 10E3/UL (ref 1.6–8.3)
NEUTROPHILS NFR BLD AUTO: 60 %
NITRATE UR QL: NEGATIVE
NRBC # BLD AUTO: 0 10E3/UL
NRBC BLD AUTO-RTO: 0 /100
PH UR STRIP: 5.5 [PH] (ref 5–7)
PLATELET # BLD AUTO: 257 10E3/UL (ref 150–450)
RBC # BLD AUTO: 4.85 10E6/UL (ref 3.8–5.2)
RBC URINE: 2 /HPF
SP GR UR STRIP: 1.03 (ref 1–1.03)
SQUAMOUS EPITHELIAL: <1 /HPF
UROBILINOGEN UR STRIP-MCNC: 2 MG/DL
WBC # BLD AUTO: 6.9 10E3/UL (ref 4–11)
WBC URINE: 2 /HPF

## 2022-04-13 PROCEDURE — 85025 COMPLETE CBC W/AUTO DIFF WBC: CPT

## 2022-04-13 PROCEDURE — 85379 FIBRIN DEGRADATION QUANT: CPT

## 2022-04-13 PROCEDURE — 36415 COLL VENOUS BLD VENIPUNCTURE: CPT

## 2022-04-13 PROCEDURE — G0463 HOSPITAL OUTPT CLINIC VISIT: HCPCS

## 2022-04-13 PROCEDURE — 99214 OFFICE O/P EST MOD 30 MIN: CPT | Performed by: INTERNAL MEDICINE

## 2022-04-13 PROCEDURE — 81001 URINALYSIS AUTO W/SCOPE: CPT

## 2022-04-13 NOTE — PATIENT INSTRUCTIONS
Please go for urine and blood test     2. If dark urine gets worse stop xatrelto     3. Go for venous comp studies, we will arrange test    4. Compression , elevation

## 2022-04-13 NOTE — PROGRESS NOTES
SUBJECTIVE:  CC:  Follow-up visit  History of left lower extremity superficial vein thrombosis and also old DVT  Taking Xarelto  Having brown urine last few days but no dizziness or lightheadedness no hematemesis or melena  HPI:This is a very pleasant 76-year-old female with past medical history for diabetes, seasonal allergic rhinitis, hyperlipidemia, MIRZA uses CPAP machine and also mild intermittent asthma and depression recently seen virtually for left lower extremity pain discomfort red erythematous and tender to touch for the last 1 week.  She was seen and evaluated by primary care physician and underwent lower extremity venous duplex which revealed chronic appearing distal left superficial vein thrombosis and also acute appearing branch of varicose vein left upper calf area.  She denied any fever, chills.  She does not recall previous DVTs or PEs and never been on blood thinners.  Non-smoker and no personal history of malignancy.  She is up-to-date with age-appropriate and gender appropriate cancer screening.  She was initiated aspirin and doxycycline by her primary yesterday  Discontinued aspirin and started Xarelto  Leg swelling better  Now having brown urine    HISTORIES:  PROBLEM LIST:   Patient Active Problem List   Diagnosis     Obesity     Fibromyalgia     Advanced directives, counseling/discussion     Allergic rhinitis     Low back pain     MIRZA (obstructive sleep apnea)- on CPAP     Mild intermittent asthma without complication     Status post total right knee replacement     Type 2 diabetes mellitus without complication (H)     Hyperlipidemia LDL goal <100     Mild single current episode of major depressive disorder (H)     Osteopenia     Mild intermittent asthma, unspecified whether complicated     PAST MEDICAL HISTORY:  Past Medical History:   Diagnosis Date     Acquired absence of organ, genital organs      Depressive disorder, not elsewhere classified      Diabetes (H)      Fibromyalgia       Gastroesophageal reflux disease      History of hormone replacement therapy      Hx musculoskletl dis NEC      Mastodynia      Noninfectious ileitis     IBS     Obesity      Osteoarthrosis, unspecified whether generalized or localized, hand      Other chronic pain      Other extrapyramidal disease and abnormal movement disorder      Other nonspecific findings on examination of blood(790.99)      Pelvic kidney      Personal history of unspecified urinary disorder      Uncomplicated asthma      Unspecified sleep apnea     CPAP used     PAST SURGICAL HISTORY:  Past Surgical History:   Procedure Laterality Date     AMB ESOPHAGEAL MANOMETRY  10/2007    Normal     APPENDECTOMY  1992     ARTHROPLASTY KNEE Right 6/7/2016    Procedure: ARTHROPLASTY KNEE;  Surgeon: Jose Alberto Gomez MD;  Location: [facility] OR     CARPAL TUNNEL RELEASE RT/LT  05/02, 08/02    right 5/2002, left 8/2002     COLONOSCOPY  4/2009    Normal; repeat in 10 years     COLONOSCOPY N/A 11/4/2015    Procedure: COLONOSCOPY;  Surgeon: Cathie Melendez MD;  Location:  GI     EXCHANGE POLY COMPONENT ARTHROPLASTY KNEE Right 9/5/2017    Procedure: EXCHANGE POLY COMPONENT ARTHROPLASTY KNEE;  1.  Right knee exploration and limited scar tissue excision.   2.  Tibial polyethylene insert exchange (after removal of the original polyethylene component and following the posterior cruciate ligament resection, a deep dish polyethylene #3 of 9 mm thickness was placed)     ;  Surgeon: Jose Alberto Gomez MD;  Location: [facility] OR     HYSTERECTOMY TOTAL ABDOMINAL, BILATERAL SALPINGO-OOPHORECTOMY, COMBINED  11/18/1992    Fibroids, endometriosis, Dr Rosas     JOINT REPLACEMENT  4/2010    bilateral thumb     PUNC/ASPIR BREAST CYST  11/2004     TONSILLECTOMY  1950     CURRENT MEDICATIONS:  Current Outpatient Medications   Medication Sig Dispense Refill     albuterol (PROAIR HFA/PROVENTIL HFA/VENTOLIN HFA) 108 (90 Base) MCG/ACT inhaler Inhale 2 puffs into the lungs every 4 hours as  needed for shortness of breath / dyspnea or wheezing 1 Inhaler 5     ARIPiprazole (ABILIFY) 2 MG tablet        blood glucose monitoring (EMILY MICROLET) lancets Use to test blood sugar 2 times daily or as directed.  Ok to substitute alternative if insurance prefers. 100 each 3     calcium carbonate (OS-NETO) 1500 (600 Ca) MG tablet Take 1 tablet (600 mg) by mouth 2 times daily (with meals)       CONTOUR NEXT TEST test strip USE TO TEST BLOOD SUGAR ONCE DAILY OR AS DIRECTED. 100 strip 1     escitalopram (LEXAPRO) 20 MG tablet Take 1 tablet (20 mg) by mouth daily 30 tablet 1     fexofenadine-pseudoePHEDrine (ALLEGRA-D)  MG per tablet Take 1 tablet by mouth 2 times daily as needed       fluticasone-salmeterol (ADVAIR DISKUS) 500-50 MCG/DOSE inhaler TAKE 1 PUFF BY MOUTH TWICE A DAY 1 each 5     LORazepam (ATIVAN) 0.5 MG tablet TAKE 1 TABLET BY MOUTH  DAILY prn  2     MELATONIN PO Take 5 mg by mouth       metFORMIN (GLUCOPHAGE-XR) 500 MG 24 hr tablet TAKE 3 TABLETS (1,500 MG) BY MOUTH DAILY (WITH DINNER) 270 tablet 1     montelukast (SINGULAIR) 10 MG tablet TAKE 1 TABLET BY MOUTH AT BEDTIME 90 tablet 1     omeprazole (PRILOSEC) 20 MG DR capsule TAKE 1 CAPSULE BY MOUTH EVERY DAY 90 capsule 2     rivaroxaban ANTICOAGULANT (XARELTO ANTICOAGULANT) 20 MG TABS tablet Take 1 tablet (20 mg) by mouth daily (with dinner) 30 tablet 1     simvastatin (ZOCOR) 40 MG tablet TAKE 1 TABLET BY MOUTH AT BEDTIME 90 tablet 2     vitamin D3 (CHOLECALCIFEROL) 2000 units (50 mcg) tablet Take 1 tablet (2,000 Units) by mouth daily       zolpidem (AMBIEN) 10 MG tablet 1 TABLET AT BEDTIME 30 tablet 0     aspirin (ASA) 325 MG tablet Take 325 mg by mouth daily (Patient not taking: Reported on 4/13/2022)       ALLERGIES:  Allergies   Allergen Reactions     Ivp Dye [Contrast Dye] Shortness Of Breath     Claritin [Loratadine]      Hallucinations       SOCIAL HISTORY:  Social History     Socioeconomic History     Marital status:      Spouse  name: Not on file     Number of children: 0     Years of education: 18     Highest education level: Not on file   Occupational History     Occupation: Psychologist     Employer: RETIRED     Comment: worked for [employer]   Tobacco Use     Smoking status: Former Smoker     Packs/day: 0.00     Years: 6.00     Pack years: 0.00     Types: Cigarettes     Quit date: 1974     Years since quittin.8     Smokeless tobacco: Never Used     Tobacco comment: Previous smoker, 1-1.5 packs per week   Substance and Sexual Activity     Alcohol use: Not Currently     Alcohol/week: 4.0 - 6.0 standard drinks     Types: 4 - 6 Standard drinks or equivalent per week     Comment: 1 drnk monthly     Drug use: No     Sexual activity: Not Currently     Birth control/protection: Post-menopausal   Other Topics Concern      Service Not Asked     Blood Transfusions No     Caffeine Concern Not Asked     Occupational Exposure Not Asked     Hobby Hazards Not Asked     Sleep Concern Yes     Comment: has sleep apnea     Stress Concern Not Asked     Weight Concern Not Asked     Special Diet No     Back Care No     Exercise Yes     Comment: Some     Bike Helmet Not Asked     Comment: Does not ride     Seat Belt Yes     Self-Exams Not Asked     Parent/sibling w/ CABG, MI or angioplasty before 65F 55M? Not Asked   Social History Narrative    Eats fruits and vegetables every day. Calcium and vitamin D supplements recommended.      Social Determinants of Health     Financial Resource Strain: Not on file   Food Insecurity: Not on file   Transportation Needs: Not on file   Physical Activity: Not on file   Stress: Not on file   Social Connections: Not on file   Intimate Partner Violence: Not on file   Housing Stability: Not on file     FAMILY HISTORY:  Family History   Problem Relation Age of Onset     Hypertension Father      Blood Disease Father         DVT's     Eye Disorder Father      Colon Cancer Father      Colon Cancer  "Paternal Uncle      Asthma Sister      C.A.D. Paternal Grandfather      Diabetes Maternal Aunt      Colon Cancer Maternal Uncle      Hyperlipidemia Paternal Grandmother      Hypertension Paternal Grandmother      Hyperlipidemia Other         Aunt     Hypertension Maternal Grandfather      Hypertension Other         Aunt     Breast Cancer No family hx of      Anesthesia Reaction No family hx of      Osteoporosis No family hx of      Thyroid Disease No family hx of      REVIEW OF SYSTEMS:  CONSTITUTIONAL:no malaise, fatigue, or other general symptoms  EYES: no subjective changes in visual acuity, no photophobia  ENT/MOUTH: no complaints of rhinorrhea, nasal congestion, sore throat, hearing changes  RESP:no SOB  CV: no c/o exertional chest pressure or GLASS  GI: No abdominal pain, constipation, change in bowel movements, nausea, pyrosis, BRBPR  :no polyuria or polydipsia, no dysuria, no gross hematuria  MUSCULOSKELATAL/vascular :   no arthalgias or myalgias, known history of bilateral lower extremity varicose veins, previous intervention  INTEGUMENTARY/SKIN: no pruritis, rashes, or moles with recent change in size, shape, or pigmentation  NEURO: no gross sensory or motor symptoms, no dizziness, no confusion  ENDOCRINE: no polyuria or polydipsia, no heat or cold intolerance  HEME/ALLERGY/IMMUNE: no fevers, chills, night sweats, or unwanted weight loss  PSYCHIATRIC: no depression, anxiety, or internal stimuli  EXAM:  /69 (BP Location: Right arm, Patient Position: Chair, Cuff Size: Adult Regular)   Pulse 85   Ht 5' 2\" (1.575 m)   Wt 164 lb 3.2 oz (74.5 kg)   SpO2 96%   BMI 30.03 kg/m    BMI: Body mass index is 30.03 kg/m .  GENERAL APPEARANCE:  Pleasant  Healthy appearing male , alert, active, no distress cooperative.  EXAM:  EYES: clear conjunctiva, no cataracts, no obvious fundoscopic abnormalities  HENT: oropharynx, nares, and TMs are WNL  NECK: no JVD, thyromegaly or lymphadenopathy, no cervical " bruits  RESP: clear to auscultation without rales, wheezes, or rhonchi  CV: RRR, no murmurs, gallops, or rubs  LYMPH: no cervical , axillary, or inguinal lymphadenopathy appreciated  GI: NABS, ND/NT, no masses or organomegally appreciated  MS: Vascular : Bilateral lower extremity varicose veins CEAP 4 CVI, no foot ulcers or leg ulcers  No edema.  Palpable symmetrical peripheral pulses  NEURO: CN II-XII intact, no localizing sensory or motor abnoramlities noted, DTRs symmetrical bilaterally  PSYCH: Mental status exam reveals the pt to have normal mood and affect. There is no disorder of thought form or content. There is no response to internal stimuli. There is no suicidal or homicidal ideation.    A/P;  (I80.3)  Left LE Superficail vein thrombosis near calf and Thrombophlebitis  (primary encounter diagnosis)  (I83.893) Varicose veins of bilateral lower extremities with other complications  (I82.512) Chronic deep vein thrombosis (DVT) of femoral vein of left lower extremity (H)  (R82.998) Brown-colored urine    She has a bilateral lower extremity varicose veins left more than right side CEAP 4 CVI  Taking Xarelto, now with brown urine  Will get CBC, D-dimer and arrange venous competency studies  If any evidence of blood in the urine or drop in hemoglobin or worsening brown urine will stop the Xarelto  Will discuss results once available  Continue to utilize compression stockings elevate the legs when able    Plan: CBC with platelets differential    Plan: UA with Microscopic reflex to Culture         (E11.9) Type 2 diabetes mellitus without complication, without long-term current use of insulin (H)  Comment:   Plan: Tight control of diabetes     30 minutes spent on the date of the encounter doing chart review, history and exam, documentation and further activities as noted above    I have discussed with patient the risks, benefits, medications, treatment options and modalities.   I have instructed the patient to call  or schedule a follow-up appointment if any problems or failure to improve.    Ronit Weiss MD,LUCAS, [facility]  Vascular medicine

## 2022-04-13 NOTE — PROGRESS NOTES
"Patient is here to discuss follow-up in the clinic in 6 weeks and at that time decide obtaining bilateral lower extremity venous competency studies.    /69 (BP Location: Right arm, Patient Position: Chair, Cuff Size: Adult Regular)   Pulse 85   Ht 5' 2\" (1.575 m)   Wt 164 lb 3.2 oz (74.5 kg)   SpO2 96%   BMI 30.03 kg/m      Questions patient would like addressed today are: When will patient be able to get off of Xarelto?    Refills are needed: No    Has homecare services and agency name:  Alethea Irizarry  "

## 2022-04-14 ENCOUNTER — MYC MEDICAL ADVICE (OUTPATIENT)
Dept: OTHER | Facility: CLINIC | Age: 77
End: 2022-04-14
Payer: MEDICARE

## 2022-04-14 NOTE — RESULT ENCOUNTER NOTE
No blood in the urine, which is good    Hemoglobin stable in good range    D-dimer normalized which is good    Continue Xarelto and complete current supply then stop it.   Once you stop the Xarelto then take baby aspirin 81 mg daily with food

## 2022-04-15 NOTE — DISCHARGE INSTRUCTIONS
GENERAL ANESTHESIA OR SEDATION ADULT DISCHARGE INSTRUCTIONS   SPECIAL PRECAUTIONS FOR 24 HOURS AFTER SURGERY    IT IS NOT UNUSUAL TO FEEL LIGHT-HEADED OR FAINT, UP TO 24 HOURS AFTER SURGERY OR WHILE TAKING PAIN MEDICATION.  IF YOU HAVE THESE SYMPTOMS; SIT FOR A FEW MINUTES BEFORE STANDING AND HAVE SOMEONE ASSIST YOU WHEN YOU GET UP TO WALK OR USE THE BATHROOM.    YOU SHOULD REST AND RELAX FOR THE NEXT 24 HOURS AND YOU MUST MAKE ARRANGEMENTS TO HAVE SOMEONE STAY WITH YOU FOR AT LEAST 24 HOURS AFTER YOUR DISCHARGE.  AVOID HAZARDOUS AND STRENUOUS ACTIVITIES.  DO NOT MAKE IMPORTANT DECISIONS FOR 24 HOURS.    DO NOT DRIVE ANY VEHICLE OR OPERATE MECHANICAL EQUIPMENT FOR 24 HOURS FOLLOWING THE END OF YOUR SURGERY.  EVEN THOUGH YOU MAY FEEL NORMAL, YOUR REACTIONS MAY BE AFFECTED BY THE MEDICATION YOU HAVE RECEIVED.    DO NOT DRINK ALCOHOLIC BEVERAGES FOR 24 HOURS FOLLOWING YOUR SURGERY.    DRINK CLEAR LIQUIDS (APPLE JUICE, GINGER ALE, 7-UP, BROTH, ETC.).  PROGRESS TO YOUR REGULAR DIET AS YOU FEEL ABLE.    YOU MAY HAVE A DRY MOUTH, A SORE THROAT, MUSCLES ACHES OR TROUBLE SLEEPING.  THESE SHOULD GO AWAY AFTER 24 HOURS.    CALL YOUR DOCTOR FOR ANY OF THE FOLLOWING:  SIGNS OF INFECTION (FEVER, GROWING TENDERNESS AT THE SURGERY SITE, A LARGE AMOUNT OF DRAINAGE OR BLEEDING, SEVERE PAIN, FOUL-SMELLING DRAINAGE, REDNESS OR SWELLING.    IT HAS BEEN OVER 8 TO 10 HOURS SINCE SURGERY AND YOU ARE STILL NOT ABLE TO URINATE (PASS WATER).             Maximum acetaminophen (Tylenol) dose from all sources should not exceed 4 grams (4000 mg) per day. You received 1000mg of Tylenol at 3pm.  You had one oxyCODONE-acetaminophen 5-325 MG tablet at 6:30 PM.           Vilma Florian, MELVA  [facility] Procedures Msg Pool  Please schedule patient for a Genicular Nerve Block Right in procedure room with Dr. Connolly. If helpful, proceed with [employer] Thermal Radiofrequency Ablation.   Patient is in room #4

## 2022-04-18 ENCOUNTER — TELEPHONE (OUTPATIENT)
Dept: OTHER | Facility: CLINIC | Age: 77
End: 2022-04-18
Payer: MEDICARE

## 2022-04-18 NOTE — TELEPHONE ENCOUNTER
Follow-up to 4/13/22      BLE Venous competency us    Results will be communicated via MyChart or telephone.

## 2022-04-20 NOTE — TELEPHONE ENCOUNTER
I called Claribel and discussed mychart below. She verbalized understanding.     Bailey BALDWIN, RN    [facility]  Vascular [facility]  Office: [phone]  Fax: [phone]

## 2022-04-20 NOTE — TELEPHONE ENCOUNTER
Spoke with patient and scheduled the BLE Venous competency us on 4/25/22 at [facility] [place] location.

## 2022-04-25 ENCOUNTER — ANCILLARY PROCEDURE (OUTPATIENT)
Dept: ULTRASOUND IMAGING | Facility: CLINIC | Age: 77
End: 2022-04-25
Attending: INTERNAL MEDICINE
Payer: MEDICARE

## 2022-04-25 DIAGNOSIS — I82.512 CHRONIC DEEP VEIN THROMBOSIS (DVT) OF FEMORAL VEIN OF LEFT LOWER EXTREMITY (H): ICD-10-CM

## 2022-04-25 DIAGNOSIS — I80.3 THROMBOPHLEBITIS LEG: ICD-10-CM

## 2022-04-25 DIAGNOSIS — I83.893 VARICOSE VEINS OF BILATERAL LOWER EXTREMITIES WITH OTHER COMPLICATIONS: ICD-10-CM

## 2022-04-25 PROCEDURE — 93970 EXTREMITY STUDY: CPT | Performed by: SURGERY

## 2022-04-27 NOTE — RESULT ENCOUNTER NOTE
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

## 2022-05-04 ENCOUNTER — OFFICE VISIT (OUTPATIENT)
Dept: FAMILY MEDICINE | Facility: CLINIC | Age: 77
End: 2022-05-04
Payer: MEDICARE

## 2022-05-04 VITALS
DIASTOLIC BLOOD PRESSURE: 60 MMHG | OXYGEN SATURATION: 95 % | HEART RATE: 86 BPM | SYSTOLIC BLOOD PRESSURE: 104 MMHG | WEIGHT: 162.8 LBS | RESPIRATION RATE: 16 BRPM | HEIGHT: 62 IN | BODY MASS INDEX: 29.96 KG/M2 | TEMPERATURE: 97.2 F

## 2022-05-04 DIAGNOSIS — Z23 HIGH PRIORITY FOR 2019-NCOV VACCINE: ICD-10-CM

## 2022-05-04 DIAGNOSIS — J45.20 MILD INTERMITTENT ASTHMA WITHOUT COMPLICATION: ICD-10-CM

## 2022-05-04 DIAGNOSIS — Z78.0 ASYMPTOMATIC POSTMENOPAUSAL STATUS: ICD-10-CM

## 2022-05-04 DIAGNOSIS — Z00.00 ENCOUNTER FOR ANNUAL WELLNESS EXAM IN MEDICARE PATIENT: Primary | ICD-10-CM

## 2022-05-04 DIAGNOSIS — E11.9 TYPE 2 DIABETES MELLITUS WITHOUT COMPLICATION, WITHOUT LONG-TERM CURRENT USE OF INSULIN (H): ICD-10-CM

## 2022-05-04 DIAGNOSIS — E78.5 HYPERLIPIDEMIA LDL GOAL <100: ICD-10-CM

## 2022-05-04 LAB
CREAT UR-MCNC: 151 MG/DL
HBA1C MFR BLD: 6.2 % (ref 0–5.6)
MICROALBUMIN UR-MCNC: 16 MG/L
MICROALBUMIN/CREAT UR: 10.6 MG/G CR (ref 0–25)

## 2022-05-04 PROCEDURE — 80076 HEPATIC FUNCTION PANEL: CPT | Performed by: FAMILY MEDICINE

## 2022-05-04 PROCEDURE — 99397 PER PM REEVAL EST PAT 65+ YR: CPT | Mod: 25 | Performed by: FAMILY MEDICINE

## 2022-05-04 PROCEDURE — 36415 COLL VENOUS BLD VENIPUNCTURE: CPT | Performed by: FAMILY MEDICINE

## 2022-05-04 PROCEDURE — 0054A COVID-19,PF,PFIZER (12+ YRS): CPT | Performed by: FAMILY MEDICINE

## 2022-05-04 PROCEDURE — 99214 OFFICE O/P EST MOD 30 MIN: CPT | Mod: 25 | Performed by: FAMILY MEDICINE

## 2022-05-04 PROCEDURE — 80061 LIPID PANEL: CPT | Performed by: FAMILY MEDICINE

## 2022-05-04 PROCEDURE — 83036 HEMOGLOBIN GLYCOSYLATED A1C: CPT | Performed by: FAMILY MEDICINE

## 2022-05-04 PROCEDURE — 91305 COVID-19,PF,PFIZER (12+ YRS): CPT | Performed by: FAMILY MEDICINE

## 2022-05-04 PROCEDURE — 82043 UR ALBUMIN QUANTITATIVE: CPT | Performed by: FAMILY MEDICINE

## 2022-05-04 RX ORDER — FLUTICASONE PROPIONATE AND SALMETEROL 250; 50 UG/1; UG/1
1 POWDER RESPIRATORY (INHALATION) EVERY 12 HOURS
Qty: 1 EACH | Refills: 5 | Status: SHIPPED | OUTPATIENT
Start: 2022-05-04 | End: 2022-11-17

## 2022-05-04 ASSESSMENT — PAIN SCALES - GENERAL: PAINLEVEL: NO PAIN (0)

## 2022-05-04 ASSESSMENT — ACTIVITIES OF DAILY LIVING (ADL): CURRENT_FUNCTION: NO ASSISTANCE NEEDED

## 2022-05-04 ASSESSMENT — ENCOUNTER SYMPTOMS
ARTHRALGIAS: 1
SHORTNESS OF BREATH: 0
JOINT SWELLING: 0
CHILLS: 0
HEMATOCHEZIA: 0
PARESTHESIAS: 0
EYE PAIN: 0
ABDOMINAL PAIN: 0
MYALGIAS: 0
NERVOUS/ANXIOUS: 0
DIARRHEA: 0
HEARTBURN: 0
SORE THROAT: 0
CONSTIPATION: 1
NAUSEA: 0
COUGH: 0
BREAST MASS: 0
DYSURIA: 0
PALPITATIONS: 0
HEADACHES: 0
DIZZINESS: 0
FREQUENCY: 0
HEMATURIA: 0
FEVER: 0
WEAKNESS: 0

## 2022-05-04 NOTE — PROGRESS NOTES
"SUBJECTIVE:   Claribel Del Rio is a 76 year old female who presents for Preventive Visit.      Patient has been advised of split billing requirements and indicates understanding: Yes  Are you in the first 12 months of your Medicare coverage?  No    Healthy Habits:     In general, how would you rate your overall health?  Good    Frequency of exercise:  None    Do you usually eat at least 4 servings of fruit and vegetables a day, include whole grains    & fiber and avoid regularly eating high fat or \"junk\" foods?  No    Taking medications regularly:  Yes    Medication side effects:  Not applicable    Ability to successfully perform activities of daily living:  No assistance needed    Home Safety:  No safety concerns identified    Hearing Impairment:  No hearing concerns    In the past 6 months, have you been bothered by leaking of urine?  No    In general, how would you rate your overall mental or emotional health?  Good      PHQ-2 Total Score: 0    Additional concerns today:  No    Do you feel safe in your environment? Yes    Have you ever done Advance Care Planning? (For example, a Health Directive, POLST, or a discussion with a medical provider or your loved ones about your wishes): Yes, patient states has an Advance Care Planning document and will bring a copy to the clinic.       Fall risk  Fallen 2 or more times in the past year?: No  Any fall with injury in the past year?: No  click delete button to remove this line now  Cognitive Screening   1) Repeat 3 items (Leader, Season, Table)    2) Clock draw: NORMAL  3) 3 item recall: Recalls 3 objects  Results: 3 items recalled: COGNITIVE IMPAIRMENT LESS LIKELY    Mini-CogTM Copyright ROGER De La O. Licensed by the author for use in [facility]; reprinted with permission (gildardo@.[facility]). All rights reserved.      Do you have sleep apnea, excessive snoring or daytime drowsiness?: yes    Reviewed and updated as needed this visit by clinical staff   Tobacco  " Allergies  Meds   Med Hx              Reviewed and updated as needed this visit by Provider    Allergies                Social History     Tobacco Use     Smoking status: Former Smoker     Packs/day: 0.00     Years: 6.00     Pack years: 0.00     Types: Cigarettes     Quit date: 1974     Years since quittin.9     Smokeless tobacco: Never Used     Tobacco comment: Previous smoker, 1-1.5 packs per week   Substance Use Topics     Alcohol use: Not Currently     Alcohol/week: 4.0 - 6.0 standard drinks     Types: 4 - 6 Standard drinks or equivalent per week     Comment: 1 drnk monthly     If you drink alcohol do you typically have >3 drinks per day or >7 drinks per week? Not applicable    Alcohol Use 2022   Prescreen: >3 drinks/day or >7 drinks/week? No   Prescreen: >3 drinks/day or >7 drinks/week? -           Diabetes Follow-up    How often are you checking your blood sugar? One time daily  What time of day are you checking your blood sugars (select all that apply)?  Before meals  Have you had any blood sugars above 200?  No  Have you had any blood sugars below 70?  No    What symptoms do you notice when your blood sugar is low?  None    What concerns do you have today about your diabetes? None     Do you have any of these symptoms? (Select all that apply)  No numbness or tingling in feet.  No redness, sores or blisters on feet.  No complaints of excessive thirst.  No reports of blurry vision.  No significant changes to weight.      BP Readings from Last 2 Encounters:   22 104/60   22 115/69     Hemoglobin A1C POCT (%)   Date Value   2021 6.9 (H)   2020 7.5 (H)     Hemoglobin A1C (%)   Date Value   2022 6.2 (H)   10/01/2021 6.6 (H)     LDL Cholesterol Calculated (mg/dL)   Date Value   2021 79   2020 80         Hyperlipidemia Follow-Up      Are you regularly taking any medication or supplement to lower your cholesterol?   Yes- statin    Are you having muscle aches  or other side effects that you think could be caused by your cholesterol lowering medication?  No    Asthma Follow-Up    Was ACT completed today?    No   ACT Total Scores 3/1/2022   ACT TOTAL SCORE -   ASTHMA ER VISITS -   ASTHMA HOSPITALIZATIONS -   ACT TOTAL SCORE (Goal Greater than or Equal to 20) 25   In the past 12 months, how many times did you visit the emergency room for your asthma without being admitted to the hospital? 0   In the past 12 months, how many times were you hospitalized overnight because of your asthma? 0          How many days per week do you miss taking your asthma controller medication?  0    Please describe any recent triggers for your asthma: None    Have you had any Emergency Room Visits, Urgent Care Visits, or Hospital Admissions since your last office visit?  No      Current providers sharing in care for this patient include:   Patient Care Team:  Chata Wallace MD as PCP - General (Family Practice)  Chata Wallace MD as Assigned PCP  Vickie Fry RD as Diabetes Educator (Dietitian, Registered)  Ronit Weiss MD as Assigned Heart and Vascular Provider    The following health maintenance items are reviewed in Epic and correct as of today:  Health Maintenance Due   Topic Date Due     ZOSTER IMMUNIZATION (1 of 2) Never done     DTAP/TDAP/TD IMMUNIZATION (1 - Tdap) 10/04/2011     ASTHMA ACTION PLAN  08/30/2018     LIPID  03/31/2022     MICROALBUMIN  03/31/2022     DIABETIC FOOT EXAM  03/31/2022     BP Readings from Last 3 Encounters:   05/04/22 104/60   04/13/22 115/69   03/01/22 118/66    Wt Readings from Last 3 Encounters:   05/04/22 73.8 kg (162 lb 12.8 oz)   04/13/22 74.5 kg (164 lb 3.2 oz)   03/01/22 75.4 kg (166 lb 3.2 oz)                  Patient Active Problem List   Diagnosis     Obesity     Fibromyalgia     Advanced directives, counseling/discussion     Allergic rhinitis     Low back pain     MIRZA (obstructive sleep apnea)- on CPAP     Mild intermittent asthma  without complication     Status post total right knee replacement     Type 2 diabetes mellitus without complication (H)     Hyperlipidemia LDL goal <100     Mild single current episode of major depressive disorder (H)     Osteopenia     Mild intermittent asthma, unspecified whether complicated     Past Surgical History:   Procedure Laterality Date     AMB ESOPHAGEAL MANOMETRY  10/2007    Normal     APPENDECTOMY  1992     ARTHROPLASTY KNEE Right 2016    Procedure: ARTHROPLASTY KNEE;  Surgeon: Jose Alberto Gomez MD;  Location: [facility] OR     CARPAL TUNNEL RELEASE RT/LT  ,     right 2002, left 2002     COLONOSCOPY  2009    Normal; repeat in 10 years     COLONOSCOPY N/A 2015    Procedure: COLONOSCOPY;  Surgeon: Cathie Melendez MD;  Location:  GI     EXCHANGE POLY COMPONENT ARTHROPLASTY KNEE Right 2017    Procedure: EXCHANGE POLY COMPONENT ARTHROPLASTY KNEE;  1.  Right knee exploration and limited scar tissue excision.   2.  Tibial polyethylene insert exchange (after removal of the original polyethylene component and following the posterior cruciate ligament resection, a deep dish polyethylene #3 of 9 mm thickness was placed)     ;  Surgeon: Jose Alberto Gomez MD;  Location: [facility] OR     HYSTERECTOMY TOTAL ABDOMINAL, BILATERAL SALPINGO-OOPHORECTOMY, COMBINED  1992    Fibroids, endometriosis, Dr Rosas     JOINT REPLACEMENT  2010    bilateral thumb     PUNC/ASPIR BREAST CYST  2004     TONSILLECTOMY  1950       Social History     Tobacco Use     Smoking status: Former Smoker     Packs/day: 0.00     Years: 6.00     Pack years: 0.00     Types: Cigarettes     Quit date: 1974     Years since quittin.9     Smokeless tobacco: Never Used     Tobacco comment: Previous smoker, 1-1.5 packs per week   Substance Use Topics     Alcohol use: Not Currently     Alcohol/week: 4.0 - 6.0 standard drinks     Types: 4 - 6 Standard drinks or equivalent per week     Comment: 1 drnk monthly     Family  History   Problem Relation Age of Onset     Hypertension Father      Blood Disease Father         DVT's     Eye Disorder Father      Colon Cancer Father      Colon Cancer Paternal Uncle      Asthma Sister      C.A.D. Paternal Grandfather      Diabetes Maternal Aunt      Colon Cancer Maternal Uncle      Hyperlipidemia Paternal Grandmother      Hypertension Paternal Grandmother      Hyperlipidemia Other         Aunt     Hypertension Maternal Grandfather      Hypertension Other         Aunt     Breast Cancer No family hx of      Anesthesia Reaction No family hx of      Osteoporosis No family hx of      Thyroid Disease No family hx of          Current Outpatient Medications   Medication Sig Dispense Refill     ARIPiprazole (ABILIFY) 2 MG tablet        blood glucose monitoring (Enjoyor MICROLET) lancets Use to test blood sugar 2 times daily or as directed.  Ok to substitute alternative if insurance prefers. 100 each 3     calcium carbonate (OS-NETO) 1500 (600 Ca) MG tablet Take 1 tablet (600 mg) by mouth 2 times daily (with meals)       CONTOUR NEXT TEST test strip USE TO TEST BLOOD SUGAR ONCE DAILY OR AS DIRECTED. 100 strip 1     escitalopram (LEXAPRO) 20 MG tablet Take 1 tablet (20 mg) by mouth daily 30 tablet 1     fexofenadine-pseudoePHEDrine (ALLEGRA-D)  MG per tablet Take 1 tablet by mouth 2 times daily as needed       fluticasone-salmeterol (ADVAIR) 250-50 MCG/DOSE inhaler Inhale 1 puff into the lungs every 12 hours 1 each 5     LORazepam (ATIVAN) 0.5 MG tablet TAKE 1 TABLET BY MOUTH  DAILY prn  2     MELATONIN PO Take 5 mg by mouth       metFORMIN (GLUCOPHAGE-XR) 500 MG 24 hr tablet TAKE 3 TABLETS (1,500 MG) BY MOUTH DAILY (WITH DINNER) 270 tablet 1     montelukast (SINGULAIR) 10 MG tablet TAKE 1 TABLET BY MOUTH AT BEDTIME 90 tablet 1     omeprazole (PRILOSEC) 20 MG DR capsule TAKE 1 CAPSULE BY MOUTH EVERY DAY 90 capsule 2     simvastatin (ZOCOR) 40 MG tablet TAKE 1 TABLET BY MOUTH AT BEDTIME 90 tablet 2      vitamin D3 (CHOLECALCIFEROL) 2000 units (50 mcg) tablet Take 1 tablet (2,000 Units) by mouth daily       zolpidem (AMBIEN) 10 MG tablet 1 TABLET AT BEDTIME 30 tablet 0     Allergies   Allergen Reactions     Ivp Dye [Contrast Dye] Shortness Of Breath     Claritin [Loratadine]      Hallucinations       Recent Labs   Lab Test 05/04/22  1221 03/03/22  1150 10/01/21  1431 03/31/21  1152 11/02/20  1444 02/03/20  1350 10/28/19  1549 12/10/18  1146   A1C 6.2*  --  6.6* 6.9*   < > 7.4* 11.4* 6.7*   LDL  --   --   --  79  --  80  --  109*   HDL  --   --   --  46*  --  43*  --  42*   TRIG  --   --   --  163*  --  158*  --  145   ALT  --   --   --  27  --   --  28 18   CR  --  0.83  --  0.78  --  0.72 0.66 0.68   GFRESTIMATED  --  74  --  75  --  83 86 85   GFRESTBLACK  --   --   --  87  --  >90 100 >90   POTASSIUM  --  4.2  --  3.8  --  4.4 4.3 4.0   TSH  --   --   --   --   --  0.90  --  0.88    < > = values in this interval not displayed.              Review of Systems   Constitutional: Negative for chills and fever.   HENT: Negative for congestion, ear pain, hearing loss and sore throat.    Eyes: Negative for pain and visual disturbance.   Respiratory: Negative for cough and shortness of breath.    Cardiovascular: Negative for chest pain, palpitations and peripheral edema.   Gastrointestinal: Positive for constipation. Negative for abdominal pain, diarrhea, heartburn, hematochezia and nausea.   Breasts:  Positive for tenderness. Negative for breast mass and discharge.   Genitourinary: Negative for dysuria, frequency, genital sores, hematuria, pelvic pain, urgency, vaginal bleeding and vaginal discharge.   Musculoskeletal: Positive for arthralgias. Negative for joint swelling and myalgias.   Skin: Negative for rash.   Neurological: Negative for dizziness, weakness, headaches and paresthesias.   Psychiatric/Behavioral: Negative for mood changes. The patient is not nervous/anxious.          OBJECTIVE:   /60   Pulse 86   " Temp 97.2  F (36.2  C) (Tympanic)   Resp 16   Ht 1.581 m (5' 2.25\")   Wt 73.8 kg (162 lb 12.8 oz)   SpO2 95%   BMI 29.54 kg/m   Estimated body mass index is 29.54 kg/m  as calculated from the following:    Height as of this encounter: 1.581 m (5' 2.25\").    Weight as of this encounter: 73.8 kg (162 lb 12.8 oz).  Physical Exam  GENERAL APPEARANCE: healthy, alert and no distress  EYES: Eyes grossly normal to inspection, PERRL and conjunctivae and sclerae normal  HENT: ear canals and TM's normal, nose and mouth without ulcers or lesions, oropharynx clear and oral mucous membranes moist  NECK: no adenopathy, no asymmetry, masses, or scars and thyroid normal to palpation  RESP: lungs clear to auscultation - no rales, rhonchi or wheezes  BREAST: normal without masses, tenderness or nipple discharge and no palpable axillary masses or adenopathy  CV: regular rate and rhythm, normal S1 S2, no S3 or S4, no murmur, click or rub, no peripheral edema and peripheral pulses strong  ABDOMEN: soft, nontender, no hepatosplenomegaly, no masses and bowel sounds normal  MS: no musculoskeletal defects are noted and gait is age appropriate without ataxia  SKIN: no suspicious lesions or rashes  NEURO: Normal strength and tone, sensory exam grossly normal, mentation intact and speech normal  PSYCH: mentation appears normal and affect normal/bright        ASSESSMENT / PLAN:   1. Encounter for annual wellness exam in Medicare patient      2. Mild intermittent asthma without complication  Well-controlled.  Decreasing the dose of Advair from 500//50.  Patient also takes Singulair every day.  She has not used albuterol inhaler in over a year now.  Does not want me to refill the medication  - fluticasone-salmeterol (ADVAIR) 250-50 MCG/DOSE inhaler; Inhale 1 puff into the lungs every 12 hours  Dispense: 1 each; Refill: 5    3. Type 2 diabetes mellitus without complication, without long-term current use of insulin (H)  Results for " "orders placed or performed in visit on 05/04/22   HEMOGLOBIN A1C     Status: Abnormal   Result Value Ref Range    Hemoglobin A1C 6.2 (H) 0.0 - 5.6 %     Well-controlled.  Resume metformin as before.  Follow-up in 6 months  - Albumin Random Urine Quantitative with Creat Ratio; Future  - HEMOGLOBIN A1C; Future  - Albumin Random Urine Quantitative with Creat Ratio  - HEMOGLOBIN A1C    4. Hyperlipidemia LDL goal <100    Previously well controlled.  Patient is overdue for labs.  Resume simvastatin 40 mg daily for now.  Refill ordered.  Await the test results  - Lipid panel reflex to direct LDL Fasting; Future  - Hepatic function panel; Future  - Lipid panel reflex to direct LDL Fasting  - Hepatic function panel    5. High priority for 2019-nCoV vaccine    - COVID-19,PF,PFIZER (12+ Yrs GRAY LABEL)    6. Asymptomatic postmenopausal status    - DX Hip/Pelvis/Spine; Future          COUNSELING:  Reviewed preventive health counseling, as reflected in patient instructions       Regular exercise       Healthy diet/nutrition    Estimated body mass index is 29.54 kg/m  as calculated from the following:    Height as of this encounter: 1.581 m (5' 2.25\").    Weight as of this encounter: 73.8 kg (162 lb 12.8 oz).    Weight management plan: Discussed healthy diet and exercise guidelines    She reports that she quit smoking about 47 years ago. Her smoking use included cigarettes. She smoked 0.00 packs per day for 6.00 years. She has never used smokeless tobacco.      Appropriate preventive services were discussed with this patient, including applicable screening as appropriate for cardiovascular disease, diabetes, osteopenia/osteoporosis, and glaucoma.  As appropriate for age/gender, discussed screening for colorectal cancer, prostate cancer, breast cancer, and cervical cancer. Checklist reviewing preventive services available has been given to the patient.    Reviewed patients plan of care and provided an AVS. The Intermediate Care " Plan ( asthma action plan, low back pain action plan, and migraine action plan) for Claribel meets the Care Plan requirement. This Care Plan has been established and reviewed with the Patient.    Counseling Resources:  ATP IV Guidelines  Pooled Cohorts Equation Calculator  Breast Cancer Risk Calculator  Breast Cancer: Medication to Reduce Risk  FRAX Risk Assessment  ICSI Preventive Guidelines  Dietary Guidelines for Americans, 2010  [employer]'s MyPlate  ASA Prophylaxis  Lung CA Screening    Chata Wallace MD  [facility]    Identified Health Risks:

## 2022-05-05 LAB
ALBUMIN SERPL-MCNC: 3.7 G/DL (ref 3.4–5)
ALP SERPL-CCNC: 90 U/L (ref 40–150)
ALT SERPL W P-5'-P-CCNC: 17 U/L (ref 0–50)
AST SERPL W P-5'-P-CCNC: 20 U/L (ref 0–45)
BILIRUB DIRECT SERPL-MCNC: 0.1 MG/DL (ref 0–0.2)
BILIRUB SERPL-MCNC: 0.6 MG/DL (ref 0.2–1.3)
CHOLEST SERPL-MCNC: 151 MG/DL
FASTING STATUS PATIENT QL REPORTED: YES
HDLC SERPL-MCNC: 43 MG/DL
LDLC SERPL CALC-MCNC: 83 MG/DL
NONHDLC SERPL-MCNC: 108 MG/DL
PROT SERPL-MCNC: 7.1 G/DL (ref 6.8–8.8)
TRIGL SERPL-MCNC: 126 MG/DL

## 2022-05-18 DIAGNOSIS — E11.9 TYPE 2 DIABETES MELLITUS WITHOUT COMPLICATION, WITHOUT LONG-TERM CURRENT USE OF INSULIN (H): ICD-10-CM

## 2022-05-22 NOTE — TELEPHONE ENCOUNTER
Appointment 5/22/2022 with Dr. Wallace  Prescription approved per [facility], [facility] or [facility] refill protocol.  Rosaline VERDE - Registered Nurse  [facility]  Acute and Diagnostic Services

## 2022-06-22 ENCOUNTER — TELEPHONE (OUTPATIENT)
Dept: OTHER | Facility: CLINIC | Age: 77
End: 2022-06-22

## 2022-06-22 NOTE — TELEPHONE ENCOUNTER
[facility]    Who is the name of the provider?:  Abhishek      What is the location you see this provider at?: [place]    Reason for call:  Has questions about US results done 4/25 and what should be the plan.     Can we leave a detailed message on this number?  YES

## 2022-06-23 NOTE — TELEPHONE ENCOUNTER
See result note above which patient read on 5/4/22.    I called Claribel and discussed the result note above. Claribel would also like to know if she should be taking a baby Aspirin. Routing to Dr. Weiss to advise.    Bailey BALDWIN, RN    [facility]  Vascular [facility]  Office: [phone]  Fax: [phone]

## 2022-06-23 NOTE — TELEPHONE ENCOUNTER
I called Claribel and DIANNA and discussed above.    Bailey BALDWIN, RN    [facility]  Vascular [facility]  Office: [phone]  Fax: [phone]

## 2022-07-18 DIAGNOSIS — J45.20 MILD INTERMITTENT ASTHMA WITHOUT COMPLICATION: ICD-10-CM

## 2022-07-21 RX ORDER — MONTELUKAST SODIUM 10 MG/1
TABLET ORAL
Qty: 90 TABLET | Refills: 1 | Status: SHIPPED | OUTPATIENT
Start: 2022-07-21 | End: 2023-02-14

## 2022-07-21 NOTE — TELEPHONE ENCOUNTER
Prescription approved per [facility] Refill Protocol.  Yaneth Valdez, RN  [facility] RN Triage Team

## 2022-08-17 DIAGNOSIS — E11.9 TYPE 2 DIABETES MELLITUS WITHOUT COMPLICATION, WITHOUT LONG-TERM CURRENT USE OF INSULIN (H): ICD-10-CM

## 2022-08-19 RX ORDER — METFORMIN HCL 500 MG
1500 TABLET, EXTENDED RELEASE 24 HR ORAL
Qty: 270 TABLET | Refills: 2 | Status: SHIPPED | OUTPATIENT
Start: 2022-08-19 | End: 2023-06-19

## 2022-08-19 NOTE — TELEPHONE ENCOUNTER
Prescription approved per [facility] Refill Protocol.    Natasha Sena RN  [facility] Triage Team

## 2022-09-21 ENCOUNTER — TELEPHONE (OUTPATIENT)
Dept: OTHER | Facility: CLINIC | Age: 77
End: 2022-09-21

## 2022-09-21 DIAGNOSIS — I80.3 THROMBOPHLEBITIS LEG: Primary | ICD-10-CM

## 2022-09-21 NOTE — TELEPHONE ENCOUNTER
Who is the name of the provider?:  Jose        What is the location you see this provider at?:  [place]        Reason for call:  Pt called stating at her last office visit RN had mentioned to her to follow up in September.     I do not see any order , please advise.         Contact name: Claribel       Contact number: [phone]    Vm: yes

## 2022-09-21 NOTE — TELEPHONE ENCOUNTER
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    Results of lab 4/13/22 - d-dimer was normalized.  Patient was instructed to continue Xarelto until supply ran out and then switch to ASA 81 mg daily.    No follow up orders in visit.    Routing to Dr. Weiss to advise on any labs/images required prior to visit.    Crystal Aguirre RN BSN  [facility]  [phone]

## 2022-09-28 ENCOUNTER — LAB (OUTPATIENT)
Dept: LAB | Facility: CLINIC | Age: 77
End: 2022-09-28
Payer: MEDICARE

## 2022-09-28 DIAGNOSIS — I80.3 THROMBOPHLEBITIS LEG: ICD-10-CM

## 2022-09-28 LAB — D DIMER PPP FEU-MCNC: 0.81 UG/ML FEU (ref 0–0.5)

## 2022-09-28 PROCEDURE — 36415 COLL VENOUS BLD VENIPUNCTURE: CPT

## 2022-09-28 PROCEDURE — 85379 FIBRIN DEGRADATION QUANT: CPT

## 2022-10-05 ENCOUNTER — TRANSFERRED RECORDS (OUTPATIENT)
Dept: FAMILY MEDICINE | Facility: CLINIC | Age: 77
End: 2022-10-05

## 2022-10-06 ENCOUNTER — ALLIED HEALTH/NURSE VISIT (OUTPATIENT)
Dept: FAMILY MEDICINE | Facility: CLINIC | Age: 77
End: 2022-10-06
Payer: MEDICARE

## 2022-10-06 DIAGNOSIS — Z23 HIGH PRIORITY FOR 2019-NCOV VACCINE: ICD-10-CM

## 2022-10-06 DIAGNOSIS — Z23 NEED FOR PROPHYLACTIC VACCINATION AND INOCULATION AGAINST INFLUENZA: ICD-10-CM

## 2022-10-06 PROCEDURE — 0124A COVID-19,PF,PFIZER BOOSTER BIVALENT: CPT

## 2022-10-06 PROCEDURE — 91312 COVID-19,PF,PFIZER BOOSTER BIVALENT: CPT

## 2022-10-06 PROCEDURE — 90662 IIV NO PRSV INCREASED AG IM: CPT

## 2022-10-06 PROCEDURE — G0008 ADMIN INFLUENZA VIRUS VAC: HCPCS

## 2022-10-06 PROCEDURE — 99207 PR NO CHARGE NURSE ONLY: CPT

## 2022-10-12 ENCOUNTER — OFFICE VISIT (OUTPATIENT)
Dept: OTHER | Facility: CLINIC | Age: 77
End: 2022-10-12
Attending: INTERNAL MEDICINE
Payer: MEDICARE

## 2022-10-12 ENCOUNTER — HOSPITAL ENCOUNTER (OUTPATIENT)
Dept: ULTRASOUND IMAGING | Facility: CLINIC | Age: 77
Discharge: HOME | End: 2022-10-12
Attending: INTERNAL MEDICINE
Payer: MEDICARE

## 2022-10-12 VITALS
SYSTOLIC BLOOD PRESSURE: 112 MMHG | OXYGEN SATURATION: 97 % | WEIGHT: 156 LBS | HEART RATE: 76 BPM | DIASTOLIC BLOOD PRESSURE: 70 MMHG | BODY MASS INDEX: 28.71 KG/M2 | HEIGHT: 62 IN

## 2022-10-12 DIAGNOSIS — I80.3 THROMBOPHLEBITIS LEG: ICD-10-CM

## 2022-10-12 DIAGNOSIS — I82.512 CHRONIC DEEP VEIN THROMBOSIS (DVT) OF FEMORAL VEIN OF LEFT LOWER EXTREMITY (H): ICD-10-CM

## 2022-10-12 DIAGNOSIS — I83.893 VARICOSE VEINS OF BILATERAL LOWER EXTREMITIES WITH OTHER COMPLICATIONS: ICD-10-CM

## 2022-10-12 DIAGNOSIS — I82.512 CHRONIC DEEP VEIN THROMBOSIS (DVT) OF FEMORAL VEIN OF LEFT LOWER EXTREMITY (H): Primary | ICD-10-CM

## 2022-10-12 PROCEDURE — G0463 HOSPITAL OUTPT CLINIC VISIT: HCPCS

## 2022-10-12 PROCEDURE — 93971 EXTREMITY STUDY: CPT | Mod: LT

## 2022-10-12 PROCEDURE — 99214 OFFICE O/P EST MOD 30 MIN: CPT | Performed by: INTERNAL MEDICINE

## 2022-10-12 NOTE — PROGRESS NOTES
"Patient is here to discuss follow up.    /70 (BP Location: Right arm, Patient Position: Chair, Cuff Size: Adult Regular)   Pulse 76   Ht 5' 2\" (1.575 m)   Wt 156 lb (70.8 kg)   SpO2 97%   BMI 28.53 kg/m      Questions patient would like addressed today are: N/A.    Refills are needed: No    Has homecare services and agency name:  Alethea Irizarry  "

## 2022-10-12 NOTE — PROGRESS NOTES
SUBJECTIVE:  CC:  Follow-up visit  History of left lower extremity superficial vein thrombosis and also old DVT  Review of labs   Mildly elevated D-dimer  Now left leg pain, no injury or twisting  Off of Xarelto taking aspirin     HPI:This is a very pleasant 77-year-old female with past medical history for diabetes, seasonal allergic rhinitis, hyperlipidemia, MIRZA uses CPAP machine and also mild intermittent asthma and depression recently seen virtually for left lower extremity pain discomfort red erythematous and tender to touch for the last 1 week.  She was seen and evaluated by primary care physician and underwent lower extremity venous duplex which revealed chronic appearing distal left superficial vein thrombosis and also acute appearing branch of varicose vein left upper calf area.  She denied any fever, chills.  She does not recall previous DVTs or PEs and never been on blood thinners.  Non-smoker and no personal history of malignancy.  She is up-to-date with age-appropriate and gender appropriate cancer screening.  She stopped Xarelto and taking aspirin daily  Recent leg pain  And D-dimer is slightly elevated  No injury          HISTORIES:  PROBLEM LIST:   Patient Active Problem List   Diagnosis     Obesity     Fibromyalgia     Advanced directives, counseling/discussion     Allergic rhinitis     Low back pain     MIRZA (obstructive sleep apnea)- on CPAP     Mild intermittent asthma without complication     Status post total right knee replacement     Type 2 diabetes mellitus without complication (H)     Hyperlipidemia LDL goal <100     Mild single current episode of major depressive disorder (H)     Osteopenia     Mild intermittent asthma, unspecified whether complicated     PAST MEDICAL HISTORY:  Past Medical History:   Diagnosis Date     Acquired absence of organ, genital organs      Depressive disorder, not elsewhere classified      Diabetes (H)      Fibromyalgia      Gastroesophageal reflux disease       History of hormone replacement therapy      Hx musculoskletl dis NEC      Mastodynia      Noninfectious ileitis     IBS     Obesity      Osteoarthrosis, unspecified whether generalized or localized, hand      Other chronic pain      Other extrapyramidal disease and abnormal movement disorder      Other nonspecific findings on examination of blood(790.99)      Pelvic kidney      Personal history of unspecified urinary disorder      Uncomplicated asthma      Unspecified sleep apnea     CPAP used     PAST SURGICAL HISTORY:  Past Surgical History:   Procedure Laterality Date     AMB ESOPHAGEAL MANOMETRY  10/2007    Normal     APPENDECTOMY  1992     ARTHROPLASTY KNEE Right 6/7/2016    Procedure: ARTHROPLASTY KNEE;  Surgeon: Jose Alberto Gomez MD;  Location: [facility] OR     CARPAL TUNNEL RELEASE RT/LT  05/02, 08/02    right 5/2002, left 8/2002     COLONOSCOPY  4/2009    Normal; repeat in 10 years     COLONOSCOPY N/A 11/4/2015    Procedure: COLONOSCOPY;  Surgeon: Cathie Melendez MD;  Location:  GI     EXCHANGE POLY COMPONENT ARTHROPLASTY KNEE Right 9/5/2017    Procedure: EXCHANGE POLY COMPONENT ARTHROPLASTY KNEE;  1.  Right knee exploration and limited scar tissue excision.   2.  Tibial polyethylene insert exchange (after removal of the original polyethylene component and following the posterior cruciate ligament resection, a deep dish polyethylene #3 of 9 mm thickness was placed)     ;  Surgeon: Jose Alberto Gomez MD;  Location: [facility] OR     HYSTERECTOMY TOTAL ABDOMINAL, BILATERAL SALPINGO-OOPHORECTOMY, COMBINED  11/18/1992    Fibroids, endometriosis, Dr Rosas     JOINT REPLACEMENT  4/2010    bilateral thumb     PUNC/ASPIR BREAST CYST  11/2004     TONSILLECTOMY  1950     CURRENT MEDICATIONS:  Current Outpatient Medications   Medication Sig Dispense Refill     ARIPiprazole (ABILIFY) 2 MG tablet        blood glucose monitoring (EMILY MICROLET) lancets Use to test blood sugar 2 times daily or as directed.  Ok to substitute  alternative if insurance prefers. 100 each 3     calcium carbonate (OS-NETO) 1500 (600 Ca) MG tablet Take 1 tablet (600 mg) by mouth 2 times daily (with meals)       CONTOUR NEXT TEST test strip USE TO TEST BLOOD SUGAR ONCE DAILY OR AS DIRECTED. 100 strip 1     escitalopram (LEXAPRO) 20 MG tablet Take 1 tablet (20 mg) by mouth daily 30 tablet 1     fexofenadine-pseudoePHEDrine (ALLEGRA-D)  MG per tablet Take 1 tablet by mouth 2 times daily as needed       fluticasone-salmeterol (ADVAIR) 250-50 MCG/DOSE inhaler Inhale 1 puff into the lungs every 12 hours 1 each 5     LORazepam (ATIVAN) 0.5 MG tablet TAKE 1 TABLET BY MOUTH  DAILY prn  2     MELATONIN PO Take 5 mg by mouth       metFORMIN (GLUCOPHAGE XR) 500 MG 24 hr tablet TAKE 3 TABLETS (1,500 MG) BY MOUTH DAILY (WITH DINNER) 270 tablet 2     montelukast (SINGULAIR) 10 MG tablet TAKE 1 TABLET BY MOUTH EVERYDAY AT BEDTIME 90 tablet 1     omeprazole (PRILOSEC) 20 MG DR capsule TAKE 1 CAPSULE BY MOUTH EVERY DAY 90 capsule 2     simvastatin (ZOCOR) 40 MG tablet TAKE 1 TABLET BY MOUTH AT BEDTIME 90 tablet 2     vitamin D3 (CHOLECALCIFEROL) 2000 units (50 mcg) tablet Take 1 tablet (2,000 Units) by mouth daily       zolpidem (AMBIEN) 10 MG tablet 1 TABLET AT BEDTIME 30 tablet 0     ALLERGIES:  Allergies   Allergen Reactions     Ivp Dye [Contrast Dye] Shortness Of Breath     Claritin [Loratadine]      Hallucinations       SOCIAL HISTORY:  Social History     Socioeconomic History     Marital status:      Spouse name: Not on file     Number of children: 0     Years of education: 18     Highest education level: Not on file   Occupational History     Occupation: Psychologist     Employer: RETIRED     Comment: worked for [employer]   Tobacco Use     Smoking status: Former     Packs/day: 0.00     Years: 6.00     Pack years: 0.00     Types: Cigarettes     Quit date: 1974     Years since quittin.3     Smokeless tobacco: Never     Tobacco  comments:     Previous smoker, 1-1.5 packs per week   Substance and Sexual Activity     Alcohol use: Not Currently     Alcohol/week: 4.0 - 6.0 standard drinks     Types: 4 - 6 Standard drinks or equivalent per week     Comment: 1 drnk monthly     Drug use: No     Sexual activity: Not Currently     Birth control/protection: Post-menopausal   Other Topics Concern      Service Not Asked     Blood Transfusions No     Caffeine Concern Not Asked     Occupational Exposure Not Asked     Hobby Hazards Not Asked     Sleep Concern Yes     Comment: has sleep apnea     Stress Concern Not Asked     Weight Concern Not Asked     Special Diet No     Back Care No     Exercise Yes     Comment: Some     Bike Helmet Not Asked     Comment: Does not ride     Seat Belt Yes     Self-Exams Not Asked     Parent/sibling w/ CABG, MI or angioplasty before 65F 55M? Not Asked   Social History Narrative    Eats fruits and vegetables every day. Calcium and vitamin D supplements recommended.      Social Determinants of Health     Financial Resource Strain: Not on file   Food Insecurity: Not on file   Transportation Needs: Not on file   Physical Activity: Not on file   Stress: Not on file   Social Connections: Not on file   Intimate Partner Violence: Not on file   Housing Stability: Not on file     FAMILY HISTORY:  Family History   Problem Relation Age of Onset     Hypertension Father      Blood Disease Father         DVT's     Eye Disorder Father      Colon Cancer Father      Colon Cancer Paternal Uncle      Asthma Sister      C.A.D. Paternal Grandfather      Diabetes Maternal Aunt      Colon Cancer Maternal Uncle      Hyperlipidemia Paternal Grandmother      Hypertension Paternal Grandmother      Hyperlipidemia Other         Aunt     Hypertension Maternal Grandfather      Hypertension Other         Aunt     Breast Cancer No family hx of      Anesthesia Reaction No family hx of      Osteoporosis No family hx of      Thyroid Disease No family  "hx of      REVIEW OF SYSTEMS:  CONSTITUTIONAL:no malaise, fatigue, or other general symptoms  EYES: no subjective changes in visual acuity, no photophobia  ENT/MOUTH: no complaints of rhinorrhea, nasal congestion, sore throat, hearing changes  RESP:no SOB  CV: no c/o exertional chest pressure or GLASS  GI: No abdominal pain, constipation, change in bowel movements, nausea, pyrosis, BRBPR  :no polyuria or polydipsia, no dysuria, no gross hematuria  MUSCULOSKELATAL/vascular :   no arthalgias or myalgias, known history of bilateral lower extremity varicose veins, previous intervention  INTEGUMENTARY/SKIN: no pruritis, rashes, or moles with recent change in size, shape, or pigmentation  NEURO: no gross sensory or motor symptoms, no dizziness, no confusion  ENDOCRINE: no polyuria or polydipsia, no heat or cold intolerance  HEME/ALLERGY/IMMUNE: no fevers, chills, night sweats, or unwanted weight loss  PSYCHIATRIC: no depression, anxiety, or internal stimuli  EXAM:  /70 (BP Location: Right arm, Patient Position: Chair, Cuff Size: Adult Regular)   Pulse 76   Ht 5' 2\" (1.575 m)   Wt 156 lb (70.8 kg)   SpO2 97%   BMI 28.53 kg/m    BMI: Body mass index is 28.53 kg/m .  GENERAL APPEARANCE:  Pleasant  Healthy appearing male , alert, active, no distress cooperative.  EXAM:  EYES: clear conjunctiva, no cataracts, no obvious fundoscopic abnormalities  HENT: oropharynx, nares, and TMs are WNL  NECK: no JVD, thyromegaly or lymphadenopathy, no cervical bruits  RESP: clear to auscultation without rales, wheezes, or rhonchi  CV: RRR, no murmurs, gallops, or rubs  LYMPH: no cervical , axillary, or inguinal lymphadenopathy appreciated  GI: NABS, ND/NT, no masses or organomegally appreciated  MS: Vascular : Bilateral lower extremity varicose veins CEAP 4 CVI, no foot ulcers or leg ulcers  No edema.  Palpable symmetrical peripheral pulses  NEURO: CN II-XII intact, no localizing sensory or motor abnoramlities noted, DTRs " symmetrical bilaterally  PSYCH: Mental status exam reveals the pt to have normal mood and affect. There is no disorder of thought form or content. There is no response to internal stimuli. There is no suicidal or homicidal ideation.      VENOUS ULTRASOUND LEFT LEG  10/12/2022 2:03 PM      HISTORY: calf pain with previous HX of DVT and sup vein thrombus off  of blood thinners ,eval; Thrombophlebitis leg; Chronic deep vein  thrombosis (DVT) of femoral vein of left lower extremity (H)     COMPARISON: Ultrasound dated 3/2/2022     FINDINGS:  Examination of the deep veins with graded compression and  color flow Doppler with spectral wave form analysis was performed.   There is no evidence for DVT or superficial venous thrombus in the  left lower extremity.                                                                      IMPRESSION: No evidence of deep venous thrombosis.     GABINO TREVIÑO MD   A/P;  (I80.3)  Left LE Superficail vein thrombosis near calf and Thrombophlebitis  (primary encounter diagnosis)  (I83.893) Varicose veins of bilateral lower extremities with other complications  (I82.512) Chronic deep vein thrombosis (DVT) of femoral vein of left lower extremity (H)    She developed left leg pain and currently off of Xarelto with mildly elevated D-dimer  She has a bilateral lower extremity varicose veins left more than right side CEAP 4 CVI  Taking aspirin  I have obtained left lower extremity venous duplex in the clinic which was negative for DVT  Continue to take aspirin  Continue to utilize compression stockings elevate the legs when able  If still continues to have problem will consider [facility] for options of [facility]       (E11.9) Type 2 diabetes mellitus without complication, without long-term current use of insulin (H)  Comment:   Plan: Tight control of diabetes     30 minutes spent on the date of the encounter doing chart review, history and exam, documentation  and further activities as noted above    Ronit Weiss MD,[facility]UCAS, [facility]  Vascular medicine

## 2022-10-12 NOTE — PATIENT INSTRUCTIONS
Use compression stockings, elevate legs when able     Take aspirin     Will arrange left leg venous ultrasound

## 2022-11-14 DIAGNOSIS — E78.5 HYPERLIPIDEMIA LDL GOAL <100: ICD-10-CM

## 2022-11-14 DIAGNOSIS — J45.20 MILD INTERMITTENT ASTHMA WITHOUT COMPLICATION: ICD-10-CM

## 2022-11-17 RX ORDER — SIMVASTATIN 40 MG
TABLET ORAL
Qty: 90 TABLET | Refills: 2 | Status: SHIPPED | OUTPATIENT
Start: 2022-11-17 | End: 2023-07-05

## 2022-11-17 RX ORDER — FLUTICASONE PROPIONATE AND SALMETEROL 50; 250 UG/1; UG/1
POWDER RESPIRATORY (INHALATION)
Qty: 60 EACH | Refills: 1 | Status: SHIPPED | OUTPATIENT
Start: 2022-11-17 | End: 2023-02-21

## 2022-12-01 ENCOUNTER — TRANSFERRED RECORDS (OUTPATIENT)
Dept: HEALTH INFORMATION MANAGEMENT | Facility: CLINIC | Age: 77
End: 2022-12-01

## 2022-12-01 LAB — RETINOPATHY: NEGATIVE

## 2023-02-13 DIAGNOSIS — J45.20 MILD INTERMITTENT ASTHMA WITHOUT COMPLICATION: ICD-10-CM

## 2023-02-14 RX ORDER — MONTELUKAST SODIUM 10 MG/1
TABLET ORAL
Qty: 90 TABLET | Refills: 1 | Status: SHIPPED | OUTPATIENT
Start: 2023-02-14 | End: 2023-07-05

## 2023-02-17 DIAGNOSIS — J45.20 MILD INTERMITTENT ASTHMA WITHOUT COMPLICATION: ICD-10-CM

## 2023-02-21 RX ORDER — FLUTICASONE PROPIONATE AND SALMETEROL 250; 50 UG/1; UG/1
POWDER RESPIRATORY (INHALATION)
Qty: 60 EACH | Refills: 1 | Status: SHIPPED | OUTPATIENT
Start: 2023-02-21 | End: 2023-05-03

## 2023-02-21 NOTE — TELEPHONE ENCOUNTER
Medication refill ordered.  Patient is overdue for asthma recheck and diabetes follow-up.    Chata Wallace MD  [facility], [place]

## 2023-03-07 ENCOUNTER — VIRTUAL VISIT (OUTPATIENT)
Dept: FAMILY MEDICINE | Facility: CLINIC | Age: 78
End: 2023-03-07
Payer: MEDICARE

## 2023-03-07 DIAGNOSIS — E11.9 TYPE 2 DIABETES MELLITUS WITHOUT COMPLICATION, WITHOUT LONG-TERM CURRENT USE OF INSULIN (H): ICD-10-CM

## 2023-03-07 DIAGNOSIS — R05.1 ACUTE COUGH: Primary | ICD-10-CM

## 2023-03-07 DIAGNOSIS — K21.9 GASTROESOPHAGEAL REFLUX DISEASE WITHOUT ESOPHAGITIS: ICD-10-CM

## 2023-03-07 DIAGNOSIS — F32.0 MILD SINGLE CURRENT EPISODE OF MAJOR DEPRESSIVE DISORDER (H): ICD-10-CM

## 2023-03-07 PROBLEM — I82.512 CHRONIC DEEP VEIN THROMBOSIS (DVT) OF FEMORAL VEIN OF LEFT LOWER EXTREMITY (H): Status: ACTIVE | Noted: 2023-03-07

## 2023-03-07 PROBLEM — I82.512 CHRONIC DEEP VEIN THROMBOSIS (DVT) OF FEMORAL VEIN OF LEFT LOWER EXTREMITY (H): Status: RESOLVED | Noted: 2023-03-07 | Resolved: 2023-03-07

## 2023-03-07 PROCEDURE — 99442 PR PHYSICIAN TELEPHONE EVALUATION 11-20 MIN: CPT | Mod: 95 | Performed by: FAMILY MEDICINE

## 2023-03-07 RX ORDER — BENZONATATE 200 MG/1
200 CAPSULE ORAL 3 TIMES DAILY PRN
Qty: 20 CAPSULE | Refills: 0 | Status: SHIPPED | OUTPATIENT
Start: 2023-03-07 | End: 2023-07-05

## 2023-03-07 ASSESSMENT — ASTHMA QUESTIONNAIRES
QUESTION_3 LAST FOUR WEEKS HOW OFTEN DID YOUR ASTHMA SYMPTOMS (WHEEZING, COUGHING, SHORTNESS OF BREATH, CHEST TIGHTNESS OR PAIN) WAKE YOU UP AT NIGHT OR EARLIER THAN USUAL IN THE MORNING: NOT AT ALL
QUESTION_5 LAST FOUR WEEKS HOW WOULD YOU RATE YOUR ASTHMA CONTROL: WELL CONTROLLED
QUESTION_2 LAST FOUR WEEKS HOW OFTEN HAVE YOU HAD SHORTNESS OF BREATH: NOT AT ALL
QUESTION_1 LAST FOUR WEEKS HOW MUCH OF THE TIME DID YOUR ASTHMA KEEP YOU FROM GETTING AS MUCH DONE AT WORK, SCHOOL OR AT HOME: NONE OF THE TIME

## 2023-03-07 ASSESSMENT — PATIENT HEALTH QUESTIONNAIRE - PHQ9: SUM OF ALL RESPONSES TO PHQ QUESTIONS 1-9: 5

## 2023-03-07 NOTE — PROGRESS NOTES
"Claribel is a 77 year old who is being evaluated via a billable telephone visit.      What phone number would you like to be contacted at? [phone]  How would you like to obtain your AVS? Latasha  Distant Location (provider location):  On-site    Assessment & Plan     Acute cough  Questionable etiology.  Could be related to reflux symptoms which are not well controlled.  No signs of infection but I cannot completely rule that possibility out as we are doing a telephone visit.  Cough medicine ordered as below.  Patient instructed stay hydrated.  If symptoms are not improving or any further worsening noted, instructed to notify us back to be seen in person or go to the urgent care.  Patient agrees to the plan  - benzonatate (TESSALON) 200 MG capsule; Take 1 capsule (200 mg) by mouth 3 times daily as needed for cough    Gastroesophageal reflux disease without esophagitis  Symptomatic.  Patient is currently using Tums.  In the past she has used omeprazole but she ran out.  Omeprazole reordered.  Instructed to start taking it right away.  If no improvement noted in the next few weeks, instructed to notify us back  - omeprazole (PRILOSEC) 20 MG DR capsule; Take 1 capsule (20 mg) by mouth daily    Type 2 diabetes mellitus without complication, without long-term current use of insulin (H)  Patient is overdue for follow-up on that.    Mild single current episode of major depressive disorder (H)    Patient is due for a recheck on that.  Instructed to follow-up for an office visit.  Patient plans to come in for her next annual checkup in May.             BMI:   Estimated body mass index is 28.53 kg/m  as calculated from the following:    Height as of 10/12/22: 1.575 m (5' 2\").    Weight as of 10/12/22: 70.8 kg (156 lb).           Return in about 9 weeks (around 5/9/2023) for Annual Physical Exam.    Chata Wallace MD  [facility]    Breonna Sanford is a 77 year old, presenting for the following " health issues:  Recheck Medication and Asthma      HPI       Patient reports of cough.  Symptoms ongoing for the past couple of weeks.  She tells that she has no associated shortness of breath or wheezing.  She has some clear phlegm that she brings up.  Cough is usually during the day.  She does not cough at night.  Reports that her heartburn is not well managed at this time.  Only has Tums that she is using.  Has used omeprazole in the past but ran out.    She no fever or chills.  No body aches.  Home COVID test was negative.  Has history of asthma but does not think that it is acting up.  She is using her inhalers and allergy medications as recommended.        Review of Systems   CONSTITUTIONAL: NEGATIVE for fever, chills, change in weight  ENT/MOUTH: NEGATIVE for ear, mouth and throat problems  RESP: NEGATIVE for significant or SOB  CV: NEGATIVE for chest pain, palpitations or peripheral edema      Objective           Vitals:  No vitals were obtained today due to virtual visit.    Physical Exam   healthy, alert and no distress  PSYCH: Alert and oriented times 3; coherent speech, normal   rate and volume, able to articulate logical thoughts, able   to abstract reason, no tangential thoughts, no hallucinations   or delusions  Her affect is normal  RESP: No cough, no audible wheezing, able to talk in full sentences  Remainder of exam unable to be completed due to telephone visits                Phone call duration: 11 minutes

## 2023-04-01 ENCOUNTER — HEALTH MAINTENANCE LETTER (OUTPATIENT)
Age: 78
End: 2023-04-01

## 2023-04-27 ENCOUNTER — OFFICE VISIT (OUTPATIENT)
Dept: OTHER | Facility: CLINIC | Age: 78
End: 2023-04-27
Attending: INTERNAL MEDICINE
Payer: MEDICARE

## 2023-04-27 VITALS
DIASTOLIC BLOOD PRESSURE: 72 MMHG | HEART RATE: 85 BPM | SYSTOLIC BLOOD PRESSURE: 125 MMHG | OXYGEN SATURATION: 96 % | WEIGHT: 154 LBS | HEIGHT: 62 IN | BODY MASS INDEX: 28.34 KG/M2

## 2023-04-27 DIAGNOSIS — I83.893 VARICOSE VEINS OF BILATERAL LOWER EXTREMITIES WITH OTHER COMPLICATIONS: ICD-10-CM

## 2023-04-27 DIAGNOSIS — E11.9 TYPE 2 DIABETES MELLITUS WITHOUT COMPLICATION, WITHOUT LONG-TERM CURRENT USE OF INSULIN (H): ICD-10-CM

## 2023-04-27 DIAGNOSIS — I82.512 CHRONIC DEEP VEIN THROMBOSIS (DVT) OF FEMORAL VEIN OF LEFT LOWER EXTREMITY (H): Primary | ICD-10-CM

## 2023-04-27 DIAGNOSIS — I80.3 THROMBOPHLEBITIS LEG: ICD-10-CM

## 2023-04-27 PROCEDURE — G0463 HOSPITAL OUTPT CLINIC VISIT: HCPCS

## 2023-04-27 PROCEDURE — 99214 OFFICE O/P EST MOD 30 MIN: CPT | Performed by: INTERNAL MEDICINE

## 2023-04-27 NOTE — PROGRESS NOTES
"Patient is here to discuss follow up    /72 (BP Location: Right arm, Patient Position: Chair, Cuff Size: Adult Regular)   Pulse 85   Ht 5' 1.5\" (1.562 m)   Wt 154 lb (69.9 kg)   SpO2 96%   BMI 28.63 kg/m      Questions patient would like addressed today are: N/A.    Refills are needed: No    Has homecare services and agency name:  [facility]    "

## 2023-04-27 NOTE — PATIENT INSTRUCTIONS
Continue to take aspirin with food     Use compression stockings day time and elevate legs when able     Continue rest of the medications same    Follow up in 6 months

## 2023-04-27 NOTE — PROGRESS NOTES
SUBJECTIVE:  CC:  Follow-up visit  History of left lower extremity superficial vein thrombosis and also old DVT ( resolved now)   Off of Xarelto taking aspirin     HPI:This is a very pleasant 77-year-old female with past medical history for diabetes, seasonal allergic rhinitis, hyperlipidemia, MIRZA uses CPAP machine and also mild intermittent asthma and depression recently seen virtually for left lower extremity pain discomfort red erythematous and tender to touch for the last 1 week.  She was seen and evaluated by primary care physician and underwent lower extremity venous duplex which revealed chronic appearing distal left superficial vein thrombosis and also acute appearing branch of varicose vein left upper calf area.  She denied any fever, chills.  She does not recall previous DVTs or PEs and never been on blood thinners.  Non-smoker and no personal history of malignancy.  She is up-to-date with age-appropriate and gender appropriate cancer screening.  She stopped Xarelto and taking aspirin daily   repeat venous duplex 10/2022 no DVT         HISTORIES:  PROBLEM LIST:   Patient Active Problem List   Diagnosis     Obesity     Fibromyalgia     Advanced directives, counseling/discussion     Allergic rhinitis     Low back pain     MIRZA (obstructive sleep apnea)- on CPAP     Mild intermittent asthma without complication     Status post total right knee replacement     Type 2 diabetes mellitus without complication (H)     Hyperlipidemia LDL goal <100     Mild single current episode of major depressive disorder (H)     Osteopenia     Mild intermittent asthma, unspecified whether complicated     PAST MEDICAL HISTORY:  Past Medical History:   Diagnosis Date     Acquired absence of organ, genital organs      Depressive disorder, not elsewhere classified      Diabetes (H)      Fibromyalgia      Gastroesophageal reflux disease      History of hormone replacement therapy      Hx musculoskletl dis NEC      Mastodynia       Noninfectious ileitis     IBS     Obesity      Osteoarthrosis, unspecified whether generalized or localized, hand      Other chronic pain      Other extrapyramidal disease and abnormal movement disorder      Other nonspecific findings on examination of blood(790.99)      Pelvic kidney      Personal history of unspecified urinary disorder      Uncomplicated asthma      Unspecified sleep apnea     CPAP used     PAST SURGICAL HISTORY:  Past Surgical History:   Procedure Laterality Date     AMB ESOPHAGEAL MANOMETRY  10/2007    Normal     APPENDECTOMY  1992     ARTHROPLASTY KNEE Right 6/7/2016    Procedure: ARTHROPLASTY KNEE;  Surgeon: Jose Alberto Gomez MD;  Location: [facility] OR     CARPAL TUNNEL RELEASE RT/LT  05/02, 08/02    right 5/2002, left 8/2002     COLONOSCOPY  4/2009    Normal; repeat in 10 years     COLONOSCOPY N/A 11/4/2015    Procedure: COLONOSCOPY;  Surgeon: Cathie Melendez MD;  Location:  GI     EXCHANGE POLY COMPONENT ARTHROPLASTY KNEE Right 9/5/2017    Procedure: EXCHANGE POLY COMPONENT ARTHROPLASTY KNEE;  1.  Right knee exploration and limited scar tissue excision.   2.  Tibial polyethylene insert exchange (after removal of the original polyethylene component and following the posterior cruciate ligament resection, a deep dish polyethylene #3 of 9 mm thickness was placed)     ;  Surgeon: Jose Alberto Gomez MD;  Location: [facility] OR     HYSTERECTOMY TOTAL ABDOMINAL, BILATERAL SALPINGO-OOPHORECTOMY, COMBINED  11/18/1992    Fibroids, endometriosis, Dr Rosas     JOINT REPLACEMENT  4/2010    bilateral thumb     PUNC/ASPIR BREAST CYST  11/2004     TONSILLECTOMY  1950     CURRENT MEDICATIONS:  Current Outpatient Medications   Medication Sig Dispense Refill     ARIPiprazole (ABILIFY) 2 MG tablet        benzonatate (TESSALON) 200 MG capsule Take 1 capsule (200 mg) by mouth 3 times daily as needed for cough 20 capsule 0     blood glucose monitoring ([employer] MICROLET) lancets Use to test blood sugar 2 times daily or as  directed.  Ok to substitute alternative if insurance prefers. 100 each 3     calcium carbonate (OS-NETO) 1500 (600 Ca) MG tablet Take 1 tablet (600 mg) by mouth 2 times daily (with meals)       CONTOUR NEXT TEST test strip USE TO TEST BLOOD SUGAR ONCE DAILY OR AS DIRECTED. 100 strip 1     escitalopram (LEXAPRO) 20 MG tablet Take 1 tablet (20 mg) by mouth daily 30 tablet 1     fexofenadine-pseudoePHEDrine (ALLEGRA-D)  MG per tablet Take 1 tablet by mouth 2 times daily as needed       fluticasone-salmeterol (ADVAIR DISKUS) 250-50 MCG/ACT inhaler INHALE 1 PUFF INTO THE LUNGS EVERY 12 HOURS. 60 each 1     LORazepam (ATIVAN) 0.5 MG tablet TAKE 1 TABLET BY MOUTH  DAILY prn  2     MELATONIN PO Take 5 mg by mouth       metFORMIN (GLUCOPHAGE XR) 500 MG 24 hr tablet TAKE 3 TABLETS (1,500 MG) BY MOUTH DAILY (WITH DINNER) 270 tablet 2     montelukast (SINGULAIR) 10 MG tablet TAKE 1 TABLET BY MOUTH EVERYDAY AT BEDTIME 90 tablet 1     omeprazole (PRILOSEC) 20 MG DR capsule Take 1 capsule (20 mg) by mouth daily 90 capsule 1     simvastatin (ZOCOR) 40 MG tablet TAKE 1 TABLET BY MOUTH EVERYDAY AT BEDTIME 90 tablet 2     vitamin D3 (CHOLECALCIFEROL) 2000 units (50 mcg) tablet Take 1 tablet (2,000 Units) by mouth daily       zolpidem (AMBIEN) 10 MG tablet 1 TABLET AT BEDTIME 30 tablet 0     ALLERGIES:  Allergies   Allergen Reactions     Ivp Dye [Contrast Dye] Shortness Of Breath     Claritin [Loratadine]      Hallucinations       SOCIAL HISTORY:  Social History     Socioeconomic History     Marital status:      Spouse name: Not on file     Number of children: 0     Years of education: 18     Highest education level: Not on file   Occupational History     Occupation: Psychologist     Employer: RETIRED     Comment: worked for [employer]   Tobacco Use     Smoking status: Former     Packs/day: 0.00     Years: 6.00     Pack years: 0.00     Types: Cigarettes     Quit date: 1974     Years since quittin.9      Smokeless tobacco: Never     Tobacco comments:     Previous smoker, 1-1.5 packs per week   Vaping Use     Vaping status: Not on file   Substance and Sexual Activity     Alcohol use: Not Currently     Alcohol/week: 4.0 - 6.0 standard drinks of alcohol     Types: 4 - 6 Standard drinks or equivalent per week     Comment: rare     Drug use: No     Sexual activity: Not Currently     Birth control/protection: Post-menopausal   Other Topics Concern      Service Not Asked     Blood Transfusions No     Caffeine Concern Not Asked     Occupational Exposure Not Asked     Hobby Hazards Not Asked     Sleep Concern Yes     Comment: has sleep apnea     Stress Concern Not Asked     Weight Concern Not Asked     Special Diet No     Back Care No     Exercise Yes     Comment: Some     Bike Helmet Not Asked     Comment: Does not ride     Seat Belt Yes     Self-Exams Not Asked     Parent/sibling w/ CABG, MI or angioplasty before 65F 55M? Not Asked   Social History Narrative    Eats fruits and vegetables every day. Calcium and vitamin D supplements recommended.      Social Determinants of Health     Financial Resource Strain: Not on file   Food Insecurity: Not on file   Transportation Needs: Not on file   Physical Activity: Not on file   Stress: Not on file   Social Connections: Not on file   Intimate Partner Violence: Not on file   Housing Stability: Not on file     FAMILY HISTORY:  Family History   Problem Relation Age of Onset     Hypertension Father      Blood Disease Father         DVT's     Eye Disorder Father      Colon Cancer Father      Colon Cancer Paternal Uncle      Asthma Sister      C.A.D. Paternal Grandfather      Diabetes Maternal Aunt      Colon Cancer Maternal Uncle      Hyperlipidemia Paternal Grandmother      Hypertension Paternal Grandmother      Hyperlipidemia Other         Aunt     Hypertension Maternal Grandfather      Hypertension Other         Aunt     Breast Cancer No family hx of      Anesthesia  "Reaction No family hx of      Osteoporosis No family hx of      Thyroid Disease No family hx of      REVIEW OF SYSTEMS:  CONSTITUTIONAL:no malaise, fatigue, or other general symptoms  EYES: no subjective changes in visual acuity, no photophobia  ENT/MOUTH: no complaints of rhinorrhea, nasal congestion, sore throat, hearing changes  RESP:no SOB  CV: no c/o exertional chest pressure or GLASS  GI: No abdominal pain, constipation, change in bowel movements, nausea, pyrosis, BRBPR  :no polyuria or polydipsia, no dysuria, no gross hematuria  MUSCULOSKELATAL/vascular :   no arthalgias or myalgias, known history of bilateral lower extremity varicose veins, previous intervention  INTEGUMENTARY/SKIN: no pruritis, rashes, or moles with recent change in size, shape, or pigmentation  NEURO: no gross sensory or motor symptoms, no dizziness, no confusion  ENDOCRINE: no polyuria or polydipsia, no heat or cold intolerance  HEME/ALLERGY/IMMUNE: no fevers, chills, night sweats, or unwanted weight loss  PSYCHIATRIC: no depression, anxiety, or internal stimuli  EXAM:  /72 (BP Location: Right arm, Patient Position: Chair, Cuff Size: Adult Regular)   Pulse 85   Ht 5' 1.5\" (1.562 m)   Wt 154 lb (69.9 kg)   SpO2 96%   BMI 28.63 kg/m    BMI: Body mass index is 28.63 kg/m .  GENERAL APPEARANCE:  Pleasant  Healthy appearing male , alert, active, no distress cooperative.  EXAM:  EYES: clear conjunctiva, no cataracts, no obvious fundoscopic abnormalities  HENT: oropharynx, nares, and TMs are WNL  NECK: no JVD, thyromegaly or lymphadenopathy, no cervical bruits  RESP: clear to auscultation without rales, wheezes, or rhonchi  CV: RRR, no murmurs, gallops, or rubs  LYMPH: no cervical , axillary, or inguinal lymphadenopathy appreciated  GI: NABS, ND/NT, no masses or organomegally appreciated  MS: Vascular : Bilateral lower extremity varicose veins CEAP 4 CVI, no foot ulcers or leg ulcers  No edema.  Palpable symmetrical peripheral " pulses  NEURO: CN II-XII intact, no localizing sensory or motor abnoramlities noted, DTRs symmetrical bilaterally  PSYCH: Mental status exam reveals the pt to have normal mood and affect. There is no disorder of thought form or content. There is no response to internal stimuli. There is no suicidal or homicidal ideation.      VENOUS ULTRASOUND LEFT LEG  10/12/2022 2:03 PM      HISTORY: calf pain with previous HX of DVT and sup vein thrombus off  of blood thinners ,eval; Thrombophlebitis leg; Chronic deep vein  thrombosis (DVT) of femoral vein of left lower extremity (H)     COMPARISON: Ultrasound dated 3/2/2022     FINDINGS:  Examination of the deep veins with graded compression and  color flow Doppler with spectral wave form analysis was performed.   There is no evidence for DVT or superficial venous thrombus in the  left lower extremity.                                                                      IMPRESSION: No evidence of deep venous thrombosis.     GABINO TREVIÑO MD   A/P;  (I80.3)  Left LE Superficail vein thrombosis near calf and Thrombophlebitis  (primary encounter diagnosis)  (I83.893) Varicose veins of bilateral lower extremities with other complications  (I82.512) Chronic deep vein thrombosis (DVT) of femoral vein of left lower extremity (H), resolved     She has a bilateral lower extremity varicose veins left more than right side CEAP 4 CVI  Taking aspirin  I have obtained left lower extremity venous duplex in the clinic last visit which was negative for DVT  Continue to take aspirin  Continue to utilize compression stockings elevate the legs when able  If still continues to have problem will consider [facility] of [facility]       Follow up in 6 months   (E11.9) Type 2 diabetes mellitus without complication, without long-term current use of insulin (H)  Comment:   Plan: Tight control of diabetes     30 minutes spent on the date of the encounter  doing chart review, history and exam, documentation and further activities as noted above    Ronit Weiss MD,[facility]UC[facility], [facility]  Vascular medicine

## 2023-05-03 DIAGNOSIS — J45.20 MILD INTERMITTENT ASTHMA WITHOUT COMPLICATION: ICD-10-CM

## 2023-05-03 RX ORDER — FLUTICASONE PROPIONATE AND SALMETEROL 50; 250 UG/1; UG/1
POWDER RESPIRATORY (INHALATION)
Qty: 60 EACH | Refills: 1 | Status: SHIPPED | OUTPATIENT
Start: 2023-05-03 | End: 2023-06-29

## 2023-06-09 ENCOUNTER — NURSE TRIAGE (OUTPATIENT)
Dept: NURSING | Facility: CLINIC | Age: 78
End: 2023-06-09
Payer: MEDICARE

## 2023-06-09 NOTE — TELEPHONE ENCOUNTER
The last week patient developed a lump under the right armpit.  It is swollen in the area around the lump.  It is not tender to touch, no fever and causing no weakness.    No appointments per scheduling, will route to clinic.    Nola Thomas RN   06/09/23 4:22 PM  [facility] Nurse Advisor    Reason for Disposition    [1] Small swelling or lump AND [2] unexplained AND [3] present > 1 week    Additional Information    Negative: Sounds like a life-threatening emergency to the triager    Negative: Small growth, spot, bump, or pigmented area of skin (e.g., moles, skin tags, wart, melanoma, skin cancer)    Negative: Inguinal hernia previously diagnosed by a physician    Negative: Followed a skin injury    Negative: Follows an insect bite    Negative: Swelling of lymph node suspected    Negative: Swelling of vaccination site    Negative: Swelling of tongue    Negative: Swelling of lip    Negative: Swelling of eye    Negative: Swelling of entire face    Negative: Swelling of scrotum    Negative: Swelling of labia    Negative: Swelling of surgical incision    Negative: Swelling of ankle joint    Negative: Swelling of elbow joint    Negative: Swelling of knee joint    Negative: Swelling with a skin rash    Negative: Patient sounds very sick or weak to the triager    Negative: SEVERE pain (e.g., excruciating)    Negative: [1] Swelling is painful to touch AND [2] fever    Negative: [1] Swelling is red AND [2] fever    Negative: [1] Swelling is red AND [2] size > 2 inches (5.0 cm) (Exception: itchy area of skin)    Negative: [1] Swelling of groin (inguinal area) AND [2] painful    Negative: [1] Swelling is painful to touch AND [2] no fever    Negative: Looks like a boil, infected sore, deep ulcer or other infected rash    Protocols used: SKIN LUMP OR LOCALIZED SWELLING-A-AH

## 2023-06-12 NOTE — TELEPHONE ENCOUNTER
I can see her at the same-day slot tomorrow.    Chata Wallace MD  [facility], [place]

## 2023-06-12 NOTE — TELEPHONE ENCOUNTER
S/w Pt and agreed with plan. Pt scheduled for tomorrow at 110pm per PCP.    Kortney EVANS RN  [facility] Triage Team

## 2023-06-13 ENCOUNTER — OFFICE VISIT (OUTPATIENT)
Dept: FAMILY MEDICINE | Facility: CLINIC | Age: 78
End: 2023-06-13
Payer: MEDICARE

## 2023-06-13 VITALS
RESPIRATION RATE: 16 BRPM | SYSTOLIC BLOOD PRESSURE: 120 MMHG | WEIGHT: 152.4 LBS | HEART RATE: 81 BPM | DIASTOLIC BLOOD PRESSURE: 70 MMHG | TEMPERATURE: 98.4 F | BODY MASS INDEX: 28.33 KG/M2 | OXYGEN SATURATION: 95 %

## 2023-06-13 DIAGNOSIS — E65 AXILLARY FAT PAD: Primary | ICD-10-CM

## 2023-06-13 DIAGNOSIS — Z23 NEED FOR COVID-19 VACCINE: ICD-10-CM

## 2023-06-13 PROCEDURE — 0124A COVID-19 BIVALENT 12+ (PFIZER): CPT | Performed by: FAMILY MEDICINE

## 2023-06-13 PROCEDURE — 99213 OFFICE O/P EST LOW 20 MIN: CPT | Mod: 25 | Performed by: FAMILY MEDICINE

## 2023-06-13 PROCEDURE — 91312 COVID-19 BIVALENT 12+ (PFIZER): CPT | Performed by: FAMILY MEDICINE

## 2023-06-13 ASSESSMENT — PAIN SCALES - GENERAL: PAINLEVEL: NO PAIN (0)

## 2023-06-13 ASSESSMENT — ASTHMA QUESTIONNAIRES: ACT_TOTALSCORE: 25

## 2023-06-13 NOTE — PROGRESS NOTES
"  Assessment & Plan     Axillary fat pad    Patient appears to have excellently fat pad which makes the 2 sides a bit asymmetrical.  No lymph nodes palpated.  No signs of any infection or inflammation in the axilla.  Patient provided with reassurance.      Need for COVID-19 vaccine    Bivalent vaccine ordered to be done today             BMI:   Estimated body mass index is 28.33 kg/m  as calculated from the following:    Height as of 4/27/23: 1.562 m (5' 1.5\").    Weight as of this encounter: 69.1 kg (152 lb 6.4 oz).           Chata Wallace MD  [facility] is a 78 year old, presenting for the following health issues:  Arm Problem ( Lump under arm)        6/13/2023     1:12 PM   Additional Questions   Roomed by Se   Accompanied by N/A     History of Present Illness       Reason for visit:  Lump under arm  Symptom onset:  3-7 days ago  Symptom intensity:  Moderate  Symptom progression:  Staying the same  Had these symptoms before:  No  What makes it worse:  No  What makes it better:  No    She eats 0-1 servings of fruits and vegetables daily.She consumes 0 sweetened beverage(s) daily.She exercises with enough effort to increase her heart rate 9 or less minutes per day.  She exercises with enough effort to increase her heart rate 3 or less days per week.   She is taking medications regularly.         No associated discomfort.  Range of motion of the shoulder and arm is normal.  No redness or drainage noted.  No fever or chills.  She is not sure this lump was present before as well or not.  She incidentally found it      Review of Systems   CONSTITUTIONAL: NEGATIVE for fever, chills, change in weight  INTEGUMENTARY/SKIN: NEGATIVE for worrisome rashes, moles or lesions  ENT/MOUTH: NEGATIVE for ear, mouth and throat problems  RESP: NEGATIVE for significant cough or SOB  CV: NEGATIVE for chest pain, palpitations or peripheral edema      Objective    /70   Pulse 81  "  Temp 98.4  F (36.9  C) (Tympanic)   Resp 16   Wt 69.1 kg (152 lb 6.4 oz)   SpO2 95%   BMI 28.33 kg/m    Body mass index is 28.33 kg/m .  Physical Exam   GENERAL: healthy, alert and no distress  NECK: no adenopathy, no asymmetry, masses, or scars and thyroid normal to palpation  RESP: lungs clear to auscultation - no rales, rhonchi or wheezes  CV: regular rate and rhythm  Bilateral axilla with out any erythema or tenderness.  No palpable lymph nodes noted.  Patient has slightly asymmetrical fat pad which makes the axillas appear to be uneven as compared to each other.  Range of motion of the shoulder is normal.  No drainage.

## 2023-06-17 ENCOUNTER — HEALTH MAINTENANCE LETTER (OUTPATIENT)
Age: 78
End: 2023-06-17

## 2023-07-05 ENCOUNTER — OFFICE VISIT (OUTPATIENT)
Dept: FAMILY MEDICINE | Facility: CLINIC | Age: 78
End: 2023-07-05
Payer: MEDICARE

## 2023-07-05 VITALS
RESPIRATION RATE: 18 BRPM | OXYGEN SATURATION: 97 % | DIASTOLIC BLOOD PRESSURE: 64 MMHG | HEIGHT: 63 IN | SYSTOLIC BLOOD PRESSURE: 94 MMHG | WEIGHT: 150.8 LBS | TEMPERATURE: 98.1 F | HEART RATE: 74 BPM | BODY MASS INDEX: 26.72 KG/M2

## 2023-07-05 DIAGNOSIS — E78.5 HYPERLIPIDEMIA LDL GOAL <100: ICD-10-CM

## 2023-07-05 DIAGNOSIS — J45.20 MILD INTERMITTENT ASTHMA WITHOUT COMPLICATION: ICD-10-CM

## 2023-07-05 DIAGNOSIS — E11.9 TYPE 2 DIABETES MELLITUS WITHOUT COMPLICATION, WITHOUT LONG-TERM CURRENT USE OF INSULIN (H): ICD-10-CM

## 2023-07-05 DIAGNOSIS — Z78.0 ASYMPTOMATIC POSTMENOPAUSAL STATUS: ICD-10-CM

## 2023-07-05 DIAGNOSIS — Z00.00 ENCOUNTER FOR ANNUAL WELLNESS EXAM IN MEDICARE PATIENT: Primary | ICD-10-CM

## 2023-07-05 LAB
ALBUMIN SERPL BCG-MCNC: 4.4 G/DL (ref 3.5–5.2)
ALP SERPL-CCNC: 86 U/L (ref 35–104)
ALT SERPL W P-5'-P-CCNC: 14 U/L (ref 0–50)
ANION GAP SERPL CALCULATED.3IONS-SCNC: 13 MMOL/L (ref 7–15)
AST SERPL W P-5'-P-CCNC: 22 U/L (ref 0–45)
BILIRUB SERPL-MCNC: 0.4 MG/DL
BUN SERPL-MCNC: 21.7 MG/DL (ref 8–23)
CALCIUM SERPL-MCNC: 10.7 MG/DL (ref 8.8–10.2)
CHLORIDE SERPL-SCNC: 110 MMOL/L (ref 98–107)
CHOLEST SERPL-MCNC: 153 MG/DL
CREAT SERPL-MCNC: 0.74 MG/DL (ref 0.51–0.95)
CREAT UR-MCNC: 79.6 MG/DL
DEPRECATED HCO3 PLAS-SCNC: 25 MMOL/L (ref 22–29)
GFR SERPL CREATININE-BSD FRML MDRD: 83 ML/MIN/1.73M2
GLUCOSE SERPL-MCNC: 100 MG/DL (ref 70–99)
HBA1C MFR BLD: 6 % (ref 0–5.6)
HDLC SERPL-MCNC: 48 MG/DL
LDLC SERPL CALC-MCNC: 65 MG/DL
MICROALBUMIN UR-MCNC: <12 MG/L
MICROALBUMIN/CREAT UR: NORMAL MG/G{CREAT}
NONHDLC SERPL-MCNC: 105 MG/DL
POTASSIUM SERPL-SCNC: 4 MMOL/L (ref 3.4–5.3)
PROT SERPL-MCNC: 7.2 G/DL (ref 6.4–8.3)
SODIUM SERPL-SCNC: 148 MMOL/L (ref 136–145)
TRIGL SERPL-MCNC: 199 MG/DL

## 2023-07-05 PROCEDURE — 99214 OFFICE O/P EST MOD 30 MIN: CPT | Mod: 25 | Performed by: FAMILY MEDICINE

## 2023-07-05 PROCEDURE — 80053 COMPREHEN METABOLIC PANEL: CPT | Performed by: FAMILY MEDICINE

## 2023-07-05 PROCEDURE — 36415 COLL VENOUS BLD VENIPUNCTURE: CPT | Performed by: FAMILY MEDICINE

## 2023-07-05 PROCEDURE — 82570 ASSAY OF URINE CREATININE: CPT | Performed by: FAMILY MEDICINE

## 2023-07-05 PROCEDURE — 80061 LIPID PANEL: CPT | Performed by: FAMILY MEDICINE

## 2023-07-05 PROCEDURE — 83036 HEMOGLOBIN GLYCOSYLATED A1C: CPT | Performed by: FAMILY MEDICINE

## 2023-07-05 PROCEDURE — G0439 PPPS, SUBSEQ VISIT: HCPCS | Performed by: FAMILY MEDICINE

## 2023-07-05 PROCEDURE — 82043 UR ALBUMIN QUANTITATIVE: CPT | Performed by: FAMILY MEDICINE

## 2023-07-05 RX ORDER — FLUTICASONE PROPIONATE AND SALMETEROL 250; 50 UG/1; UG/1
1 POWDER RESPIRATORY (INHALATION) EVERY 12 HOURS
Qty: 60 EACH | Refills: 5 | Status: SHIPPED | OUTPATIENT
Start: 2023-07-05 | End: 2023-09-18

## 2023-07-05 RX ORDER — SIMVASTATIN 40 MG
40 TABLET ORAL AT BEDTIME
Qty: 90 TABLET | Refills: 3 | Status: SHIPPED | OUTPATIENT
Start: 2023-07-05 | End: 2024-07-15

## 2023-07-05 RX ORDER — MONTELUKAST SODIUM 10 MG/1
1 TABLET ORAL AT BEDTIME
Qty: 90 TABLET | Refills: 3 | Status: SHIPPED | OUTPATIENT
Start: 2023-07-05 | End: 2024-06-14

## 2023-07-05 RX ORDER — METFORMIN HCL 500 MG
1500 TABLET, EXTENDED RELEASE 24 HR ORAL
Qty: 270 TABLET | Refills: 3 | Status: SHIPPED | OUTPATIENT
Start: 2023-07-05 | End: 2023-07-11

## 2023-07-05 ASSESSMENT — ENCOUNTER SYMPTOMS
HEARTBURN: 0
CONSTIPATION: 1
PALPITATIONS: 0
HEMATOCHEZIA: 0
SORE THROAT: 0
JOINT SWELLING: 0
SHORTNESS OF BREATH: 0
DYSURIA: 0
DIARRHEA: 0
HEMATURIA: 0
MYALGIAS: 0
FREQUENCY: 0
WEAKNESS: 0
CHILLS: 0
COUGH: 0
HEADACHES: 0
DIZZINESS: 0
EYE PAIN: 0
ABDOMINAL PAIN: 0
ARTHRALGIAS: 0
FEVER: 0
NERVOUS/ANXIOUS: 0
PARESTHESIAS: 0
BREAST MASS: 0
NAUSEA: 0

## 2023-07-05 ASSESSMENT — PATIENT HEALTH QUESTIONNAIRE - PHQ9
10. IF YOU CHECKED OFF ANY PROBLEMS, HOW DIFFICULT HAVE THESE PROBLEMS MADE IT FOR YOU TO DO YOUR WORK, TAKE CARE OF THINGS AT HOME, OR GET ALONG WITH OTHER PEOPLE: NOT DIFFICULT AT ALL
SUM OF ALL RESPONSES TO PHQ QUESTIONS 1-9: 4
SUM OF ALL RESPONSES TO PHQ QUESTIONS 1-9: 4

## 2023-07-05 ASSESSMENT — PAIN SCALES - GENERAL: PAINLEVEL: NO PAIN (0)

## 2023-07-05 ASSESSMENT — ASTHMA QUESTIONNAIRES: ACT_TOTALSCORE: 25

## 2023-07-05 ASSESSMENT — ACTIVITIES OF DAILY LIVING (ADL): CURRENT_FUNCTION: NO ASSISTANCE NEEDED

## 2023-07-05 NOTE — PROGRESS NOTES
"SUBJECTIVE:   Claribel is a 78 year old who presents for Preventive Visit.      7/5/2023    12:47 PM   Additional Questions   Roomed by Karley Mac     Are you in the first 12 months of your Medicare coverage?  No    Healthy Habits:     In general, how would you rate your overall health?  Good    Frequency of exercise:  None    Do you usually eat at least 4 servings of fruit and vegetables a day, include whole grains    & fiber and avoid regularly eating high fat or \"junk\" foods?  No    Taking medications regularly:  Yes    Medication side effects:  None    Ability to successfully perform activities of daily living:  No assistance needed    Home Safety:  No safety concerns identified    Hearing Impairment:  Need to ask people to speak up or repeat themselves and difficulty understanding soft or whispered speech    In the past 6 months, have you been bothered by leaking of urine?  No    In general, how would you rate your overall mental or emotional health?  Good    Additional concerns today:  No        Have you ever done Advance Care Planning? (For example, a Health Directive, POLST, or a discussion with a medical provider or your loved ones about your wishes): Yes, advance care planning is on file.       Fall risk  Fallen 2 or more times in the past year?: No  Any fall with injury in the past year?: No    Cognitive Screening   1) Repeat 3 items (Leader, Season, Table)    2) Clock draw: NORMAL  3) 3 item recall: Recalls 3 objects  Results: 3 items recalled: COGNITIVE IMPAIRMENT LESS LIKELY    Mini-CogTM Copyright ROGER De La O. Licensed by the author for use in [facility]; reprinted with permission (gildardo@.[facility]). All rights reserved.      Do you have sleep apnea, excessive snoring or daytime drowsiness?: yes    Reviewed and updated as needed this visit by clinical staff   Tobacco  Allergies  Meds              Reviewed and updated as needed this visit by Provider    Allergies              Social History "     Tobacco Use    Smoking status: Former     Packs/day: 0.00     Years: 6.00     Pack years: 0.00     Types: Cigarettes     Quit date: 1974     Years since quittin.2    Smokeless tobacco: Never    Tobacco comments:     Previous smoker, 1-1.5 packs per week   Substance Use Topics    Alcohol use: Not Currently     Alcohol/week: 4.0 - 6.0 standard drinks of alcohol     Types: 4 - 6 Standard drinks or equivalent per week     Comment: rare             2023    12:37 PM   Alcohol Use   Prescreen: >3 drinks/day or >7 drinks/week? No     Do you have a current opioid prescription? No  Do you use any other controlled substances or medications that are not prescribed by a provider? None          Diabetes Follow-up    How often are you checking your blood sugar? A few times a week  What time of day are you checking your blood sugars (select all that apply)?  Before meals  Have you had any blood sugars above 200?  No  Have you had any blood sugars below 70?  No  What symptoms do you notice when your blood sugar is low?  None  What concerns do you have today about your diabetes? None   Do you have any of these symptoms? (Select all that apply)  No numbness or tingling in feet.  No redness, sores or blisters on feet.  No complaints of excessive thirst.  No reports of blurry vision.  No significant changes to weight.      BP Readings from Last 2 Encounters:   23 94/64   23 120/70     Hemoglobin A1C (%)   Date Value   2023 6.0 (H)   2022 6.2 (H)   2021 6.9 (H)   2020 7.5 (H)     LDL Cholesterol Calculated (mg/dL)   Date Value   2023 65   2022 83   2021 79   2020 80             Hyperlipidemia Follow-Up    Are you regularly taking any medication or supplement to lower your cholesterol?   Yes- statin  Are you having muscle aches or other side effects that you think could be caused by your cholesterol lowering medication?  No    Asthma Follow-Up    Was ACT  completed today?  Yes        7/5/2023    12:39 PM   ACT Total Scores   ACT TOTAL SCORE (Goal Greater than or Equal to 20) 25   In the past 12 months, how many times did you visit the emergency room for your asthma without being admitted to the hospital? 0   In the past 12 months, how many times were you hospitalized overnight because of your asthma? 0        How many days per week do you miss taking your asthma controller medication?  0  Please describe any recent triggers for your asthma: None  Have you had any Emergency Room Visits, Urgent Care Visits, or Hospital Admissions since your last office visit?  No    Current providers sharing in care for this patient include:   Patient Care Team:  Chata Wallace MD as PCP - General (Family Practice)  Chata Wallace MD as Assigned PCP  Vickie Fry RD as Diabetes Educator (Dietitian, Registered)  Ronit Weiss MD as Assigned Heart and Vascular Provider    The following health maintenance items are reviewed in Epic and correct as of today:  Health Maintenance   Topic Date Due    ASTHMA ACTION PLAN  08/30/2018    DTAP/TDAP/TD IMMUNIZATION (2 - Td or Tdap) 10/04/2021    DEXA  12/17/2021    DIABETIC FOOT EXAM  03/31/2022    MEDICARE ANNUAL WELLNESS VISIT  05/04/2023    INFLUENZA VACCINE (1) 09/01/2023    A1C  01/05/2024    ASTHMA CONTROL TEST  01/05/2024    PHQ-9  01/05/2024    ANNUAL REVIEW OF HM ORDERS  06/13/2024    BMP  07/05/2024    LIPID  07/05/2024    MICROALBUMIN  07/05/2024    FALL RISK ASSESSMENT  07/05/2024    EYE EXAM  12/01/2024    ADVANCE CARE PLANNING  08/09/2028    HEPATITIS C SCREENING  Completed    DEPRESSION ACTION PLAN  Completed    Pneumococcal Vaccine: 65+ Years  Completed    ZOSTER IMMUNIZATION  Completed    COVID-19 Vaccine  Completed    IPV IMMUNIZATION  Aged Out    MENINGITIS IMMUNIZATION  Aged Out    MAMMO SCREENING  Discontinued    COLORECTAL CANCER SCREENING  Discontinued     BP Readings from Last 3 Encounters:   07/05/23 94/64    06/13/23 120/70   04/27/23 125/72    Wt Readings from Last 3 Encounters:   07/05/23 68.4 kg (150 lb 12.8 oz)   06/13/23 69.1 kg (152 lb 6.4 oz)   04/27/23 69.9 kg (154 lb)                  Patient Active Problem List   Diagnosis    Obesity    Fibromyalgia    Advanced directives, counseling/discussion    Allergic rhinitis    Low back pain    MIRZA (obstructive sleep apnea)- on CPAP    Mild intermittent asthma without complication    Status post total right knee replacement    Type 2 diabetes mellitus without complication (H)    Hyperlipidemia LDL goal <100    Mild single current episode of major depressive disorder (H)    Osteopenia    Mild intermittent asthma, unspecified whether complicated     Past Surgical History:   Procedure Laterality Date    AMB ESOPHAGEAL MANOMETRY  10/2007    Normal    APPENDECTOMY  1992    ARTHROPLASTY KNEE Right 6/7/2016    Procedure: ARTHROPLASTY KNEE;  Surgeon: Jose Alberto Gomez MD;  Location: [facility] OR    CARPAL TUNNEL RELEASE RT/LT  05/02, 08/02    right 5/2002, left 8/2002    COLONOSCOPY  4/2009    Normal; repeat in 10 years    COLONOSCOPY N/A 11/4/2015    Procedure: COLONOSCOPY;  Surgeon: Catihe Melendez MD;  Location:  GI    EXCHANGE POLY COMPONENT ARTHROPLASTY KNEE Right 9/5/2017    Procedure: EXCHANGE POLY COMPONENT ARTHROPLASTY KNEE;  1.  Right knee exploration and limited scar tissue excision.   2.  Tibial polyethylene insert exchange (after removal of the original polyethylene component and following the posterior cruciate ligament resection, a deep dish polyethylene #3 of 9 mm thickness was placed)     ;  Surgeon: Jose Alberto Gomez MD;  Location: [facility] OR    HYSTERECTOMY TOTAL ABDOMINAL, BILATERAL SALPINGO-OOPHORECTOMY, COMBINED  11/18/1992    Fibroids, endometriosis, Dr Rosas    JOINT REPLACEMENT  4/2010    bilateral thumb    PUNC/ASPIR BREAST CYST  11/2004    TONSILLECTOMY  1950       Social History     Tobacco Use    Smoking status: Former     Packs/day: 0.00     Years:  6.00     Pack years: 0.00     Types: Cigarettes     Quit date: 1974     Years since quittin.2    Smokeless tobacco: Never    Tobacco comments:     Previous smoker, 1-1.5 packs per week   Substance Use Topics    Alcohol use: Not Currently     Alcohol/week: 4.0 - 6.0 standard drinks of alcohol     Types: 4 - 6 Standard drinks or equivalent per week     Comment: rare     Family History   Problem Relation Age of Onset    Hypertension Father     Blood Disease Father         DVT's    Eye Disorder Father     Colon Cancer Father     Colon Cancer Paternal Uncle     Asthma Sister     C.A.D. Paternal Grandfather     Diabetes Maternal Aunt     Colon Cancer Maternal Uncle     Hyperlipidemia Paternal Grandmother     Hypertension Paternal Grandmother     Hyperlipidemia Other         Aunt    Hypertension Maternal Grandfather     Hypertension Other         Aunt    Breast Cancer No family hx of     Anesthesia Reaction No family hx of     Osteoporosis No family hx of     Thyroid Disease No family hx of          Current Outpatient Medications   Medication Sig Dispense Refill    ARIPiprazole (ABILIFY) 2 MG tablet       blood glucose (CONTOUR NEXT TEST) test strip Use to test blood sugar 1 times daily or as directed. 100 strip 3    blood glucose monitoring (EMILY MICROLET) lancets Use to test blood sugar 2 times daily or as directed.  Ok to substitute alternative if insurance prefers. 100 each 3    calcium carbonate (OS-NETO) 1500 (600 Ca) MG tablet Take 1 tablet (600 mg) by mouth 2 times daily (with meals)      escitalopram (LEXAPRO) 20 MG tablet Take 1 tablet (20 mg) by mouth daily 30 tablet 1    fexofenadine-pseudoePHEDrine (ALLEGRA-D)  MG per tablet Take 1 tablet by mouth 2 times daily as needed      fluticasone-salmeterol (ADVAIR DISKUS) 250-50 MCG/ACT inhaler Inhale 1 puff into the lungs every 12 hours 60 each 5    LORazepam (ATIVAN) 0.5 MG tablet TAKE 1 TABLET BY MOUTH  DAILY prn  2    MELATONIN PO Take 5 mg by mouth  "     montelukast (SINGULAIR) 10 MG tablet Take 1 tablet (10 mg) by mouth At Bedtime 90 tablet 3    simvastatin (ZOCOR) 40 MG tablet Take 1 tablet (40 mg) by mouth At Bedtime 90 tablet 3    vitamin D3 (CHOLECALCIFEROL) 2000 units (50 mcg) tablet Take 1 tablet (2,000 Units) by mouth daily      zolpidem (AMBIEN) 10 MG tablet 1 TABLET AT BEDTIME 30 tablet 0    metFORMIN (GLUCOPHAGE XR) 500 MG 24 hr tablet Take 2 tablets (1,000 mg) by mouth daily (with dinner) 180 tablet 3     Allergies   Allergen Reactions    Ivp Dye [Contrast Dye] Shortness Of Breath    Claritin [Loratadine]      Hallucinations           Mammogram Screening - Patient over age 75, has elected to continue with screening.  Pertinent mammograms are reviewed under the imaging tab.    Review of Systems   Constitutional:  Negative for chills and fever.   HENT:  Negative for congestion, ear pain, hearing loss and sore throat.    Eyes:  Negative for pain and visual disturbance.   Respiratory:  Negative for cough and shortness of breath.    Cardiovascular:  Negative for chest pain, palpitations and peripheral edema.   Gastrointestinal:  Positive for constipation. Negative for abdominal pain, diarrhea, heartburn, hematochezia and nausea.   Breasts:  Positive for tenderness. Negative for breast mass and discharge.   Genitourinary:  Positive for vaginal discharge. Negative for dysuria, frequency, genital sores, hematuria, pelvic pain, urgency and vaginal bleeding.   Musculoskeletal:  Negative for arthralgias, joint swelling and myalgias.   Skin:  Negative for rash.   Neurological:  Negative for dizziness, weakness, headaches and paresthesias.   Psychiatric/Behavioral:  Negative for mood changes. The patient is not nervous/anxious.        OBJECTIVE:   BP 94/64   Pulse 74   Temp 98.1  F (36.7  C) (Tympanic)   Resp 18   Ht 1.589 m (5' 2.56\")   Wt 68.4 kg (150 lb 12.8 oz)   SpO2 97%   BMI 27.09 kg/m   Estimated body mass index is 27.09 kg/m  as calculated from " "the following:    Height as of this encounter: 1.589 m (5' 2.56\").    Weight as of this encounter: 68.4 kg (150 lb 12.8 oz).  Physical Exam  GENERAL APPEARANCE: healthy, alert and no distress  EYES: Eyes grossly normal to inspection, PERRL and conjunctivae and sclerae normal  HENT: ear canals and TM's normal, nose and mouth without ulcers or lesions, oropharynx clear and oral mucous membranes moist  NECK: no adenopathy, no asymmetry, masses, or scars and thyroid normal to palpation  RESP: lungs clear to auscultation - no rales, rhonchi or wheezes  BREAST: normal without masses, tenderness or nipple discharge and no palpable axillary masses or adenopathy  CV: regular rate and rhythm, normal S1 S2, no S3 or S4, no murmur, click or rub, no peripheral edema and peripheral pulses strong  ABDOMEN: soft, nontender, no hepatosplenomegaly, no masses and bowel sounds normal  MS: no musculoskeletal defects are noted and gait is age appropriate without ataxia  SKIN: no suspicious lesions or rashes  NEURO: Normal strength and tone, sensory exam grossly normal, mentation intact and speech normal  PSYCH: mentation appears normal and affect normal/bright        ASSESSMENT / PLAN:   1. Encounter for annual wellness exam in Medicare patient      2. Mild intermittent asthma without complication  Well-managed.  Resume medications as before  - montelukast (SINGULAIR) 10 MG tablet; Take 1 tablet (10 mg) by mouth At Bedtime  Dispense: 90 tablet; Refill: 3  - fluticasone-salmeterol (ADVAIR DISKUS) 250-50 MCG/ACT inhaler; Inhale 1 puff into the lungs every 12 hours  Dispense: 60 each; Refill: 5    3. Hyperlipidemia LDL goal <100  LDL is within normal range.  Triglycerides are elevated.  Resume simvastatin 40 mg daily for now.  Encouraged to eat a healthier diet with low carbohydrates and exercise regularly.  Stay well-hydrated.  - Lipid panel reflex to direct LDL Non-fasting; Future  - simvastatin (ZOCOR) 40 MG tablet; Take 1 tablet (40 mg) " "by mouth At Bedtime  Dispense: 90 tablet; Refill: 3  - Comprehensive metabolic panel (BMP + Alb, Alk Phos, ALT, AST, Total. Bili, TP); Future  - Lipid panel reflex to direct LDL Non-fasting  - Comprehensive metabolic panel (BMP + Alb, Alk Phos, ALT, AST, Total. Bili, TP)    4. Type 2 diabetes mellitus without complication, without long-term current use of insulin (H)  Well-managed.  Resume current medications.  Refills ordered  - HEMOGLOBIN A1C; Future  - Albumin Random Urine Quantitative with Creat Ratio; Future  - blood glucose (CONTOUR NEXT TEST) test strip; Use to test blood sugar 1 times daily or as directed.  Dispense: 100 strip; Refill: 3  - Comprehensive metabolic panel (BMP + Alb, Alk Phos, ALT, AST, Total. Bili, TP); Future  - HEMOGLOBIN A1C  - Albumin Random Urine Quantitative with Creat Ratio  - Comprehensive metabolic panel (BMP + Alb, Alk Phos, ALT, AST, Total. Bili, TP)    5. Asymptomatic postmenopausal status  Instructed to continue taking calcium and vitamin D supplement and weightbearing exercises.  Bone density scan ordered  - DX Hip/Pelvis/Spine; Future            COUNSELING:  Reviewed preventive health counseling, as reflected in patient instructions       Regular exercise       Healthy diet/nutrition      BMI:   Estimated body mass index is 27.09 kg/m  as calculated from the following:    Height as of this encounter: 1.589 m (5' 2.56\").    Weight as of this encounter: 68.4 kg (150 lb 12.8 oz).         She reports that she quit smoking about 49 years ago. Her smoking use included cigarettes. She has never used smokeless tobacco.      Appropriate preventive services were discussed with this patient, including applicable screening as appropriate for cardiovascular disease, diabetes, osteopenia/osteoporosis, and glaucoma.  As appropriate for age/gender, discussed screening for colorectal cancer, prostate cancer, breast cancer, and cervical cancer. Checklist reviewing preventive services available " has been given to the patient.    Reviewed patients plan of care and provided an AVS. The Intermediate Care Plan ( asthma action plan, low back pain action plan, and migraine action plan) for Claribel meets the Care Plan requirement. This Care Plan has been established and reviewed with the Patient.          Chata Wallace MD  [facility] [place]    Identified Health Risks:    Answers for HPI/ROS submitted by the patient on 7/5/2023  If you checked off any problems, how difficult have these problems made it for you to do your work, take care of things at home, or get along with other people?: Not difficult at all  PHQ9 TOTAL SCORE: 4

## 2023-07-11 DIAGNOSIS — E11.9 TYPE 2 DIABETES MELLITUS WITHOUT COMPLICATION, WITHOUT LONG-TERM CURRENT USE OF INSULIN (H): ICD-10-CM

## 2023-07-11 RX ORDER — METFORMIN HCL 500 MG
1000 TABLET, EXTENDED RELEASE 24 HR ORAL
Qty: 180 TABLET | Refills: 3 | Status: SHIPPED | OUTPATIENT
Start: 2023-07-11 | End: 2024-07-15

## 2023-09-18 ENCOUNTER — TELEPHONE (OUTPATIENT)
Dept: FAMILY MEDICINE | Facility: CLINIC | Age: 78
End: 2023-09-18
Payer: MEDICARE

## 2023-09-18 DIAGNOSIS — J45.20 MILD INTERMITTENT ASTHMA WITHOUT COMPLICATION: ICD-10-CM

## 2023-09-18 RX ORDER — FLUTICASONE PROPIONATE AND SALMETEROL 250; 50 UG/1; UG/1
1 POWDER RESPIRATORY (INHALATION) EVERY 12 HOURS
Qty: 60 EACH | Refills: 5 | Status: SHIPPED | OUTPATIENT
Start: 2023-09-18 | End: 2023-09-28

## 2023-09-18 NOTE — TELEPHONE ENCOUNTER
Patient called the clinic stating all of a sudden Rx for fluticasone-salmeterol (ADVAIR DISKUS) 250-50 MCG/ACT inhaler is costing her $44 and not $420. She and the pharmacy do not know why. Advised pt call her insurance to discuss the reason and possible alternatives and or concentrations. In the meantime, pt would like a new Rx local printed and mailed to her house (address confirmed correct on file) so she can fax it to [facility] in [place].

## 2023-09-18 NOTE — TELEPHONE ENCOUNTER
Prescription printed.  Please mail it to the patient.    Chata Wallace MD  [facility], [place]

## 2023-09-27 NOTE — TELEPHONE ENCOUNTER
Dr. Wallace,    Pt called yesterday to check on script that was mailed 9/20, as she had not received it yet.  Was told to call back today if still not in the mail.  Pt has not yet received script.  Would you be able to reprint/sign and we can mail again to pt so she can get her inhaler.     Pt has 4 days worth on hand, so hoping this can be mailed again by tomorrow.     Yaneth Valdez, JASON  [facility] RN Triage Team

## 2023-09-28 RX ORDER — FLUTICASONE PROPIONATE AND SALMETEROL 250; 50 UG/1; UG/1
1 POWDER RESPIRATORY (INHALATION) EVERY 12 HOURS
Qty: 60 EACH | Refills: 5 | Status: SHIPPED | OUTPATIENT
Start: 2023-09-28 | End: 2024-02-02

## 2023-09-28 NOTE — TELEPHONE ENCOUNTER
I am not in clinic today or tomorrow. If the patient has still not received the mailed RX yet, please ask another provider to reorder the RX and print and perhaps have the patient pick it up or mail it again.    Chata Wallace MD  [facility], [place]

## 2023-09-28 NOTE — CONFIDENTIAL NOTE
Prescription printed.  Advised TC to call patient if she want to pick it up or mail it back.    Cortez Mueller MD

## 2023-09-28 NOTE — TELEPHONE ENCOUNTER
See the previous message please.    Chata Wallace MD  [facility], [place]

## 2023-09-28 NOTE — TELEPHONE ENCOUNTER
"Routing to EC provider's. Patient has not received mailed RX. Per Dr. Wallace's message below \"please ask another provider to reorder the RX and print and perhaps have the patient pick it up or mail it again.\"    Natasha VERDE RN  [facility] Triage Team    "

## 2023-09-28 NOTE — TELEPHONE ENCOUNTER
S/w pt who requested printed Rx be mailed to:  [address]   [place] [address]     Rx: fluticasone-salmeterol (ADVAIR DISKUS) 250-50 MCG/ACT inhaler     Mailed 9/28/23.     Zina,   [facility]

## 2023-10-12 ENCOUNTER — ANCILLARY PROCEDURE (OUTPATIENT)
Dept: BONE DENSITY | Facility: CLINIC | Age: 78
End: 2023-10-12
Attending: FAMILY MEDICINE
Payer: MEDICARE

## 2023-10-12 ENCOUNTER — TRANSFERRED RECORDS (OUTPATIENT)
Dept: HEALTH INFORMATION MANAGEMENT | Facility: CLINIC | Age: 78
End: 2023-10-12

## 2023-10-12 DIAGNOSIS — Z78.0 ASYMPTOMATIC POSTMENOPAUSAL STATUS: ICD-10-CM

## 2023-10-12 PROCEDURE — 77080 DXA BONE DENSITY AXIAL: CPT | Mod: TC | Performed by: PHYSICIAN ASSISTANT

## 2023-12-04 ENCOUNTER — TRANSFERRED RECORDS (OUTPATIENT)
Dept: HEALTH INFORMATION MANAGEMENT | Facility: CLINIC | Age: 78
End: 2023-12-04
Payer: MEDICARE

## 2023-12-04 LAB — RETINOPATHY: NEGATIVE

## 2023-12-07 ENCOUNTER — HOSPITAL ENCOUNTER (OUTPATIENT)
Dept: ULTRASOUND IMAGING | Facility: CLINIC | Age: 78
Discharge: HOME | End: 2023-12-07
Attending: INTERNAL MEDICINE
Payer: MEDICARE

## 2023-12-07 ENCOUNTER — OFFICE VISIT (OUTPATIENT)
Dept: OTHER | Facility: CLINIC | Age: 78
End: 2023-12-07
Attending: INTERNAL MEDICINE
Payer: MEDICARE

## 2023-12-07 VITALS
HEIGHT: 62 IN | OXYGEN SATURATION: 95 % | BODY MASS INDEX: 28.71 KG/M2 | HEART RATE: 89 BPM | WEIGHT: 156 LBS | SYSTOLIC BLOOD PRESSURE: 121 MMHG | DIASTOLIC BLOOD PRESSURE: 84 MMHG

## 2023-12-07 DIAGNOSIS — I82.512 CHRONIC DEEP VEIN THROMBOSIS (DVT) OF FEMORAL VEIN OF LEFT LOWER EXTREMITY (H): ICD-10-CM

## 2023-12-07 DIAGNOSIS — M79.605 PAIN OF LEFT LOWER EXTREMITY: ICD-10-CM

## 2023-12-07 DIAGNOSIS — E11.9 TYPE 2 DIABETES MELLITUS WITHOUT COMPLICATION, WITHOUT LONG-TERM CURRENT USE OF INSULIN (H): ICD-10-CM

## 2023-12-07 DIAGNOSIS — I80.3 THROMBOPHLEBITIS LEG: ICD-10-CM

## 2023-12-07 DIAGNOSIS — I83.893 VARICOSE VEINS OF BILATERAL LOWER EXTREMITIES WITH OTHER COMPLICATIONS: ICD-10-CM

## 2023-12-07 DIAGNOSIS — I82.512 CHRONIC DEEP VEIN THROMBOSIS (DVT) OF FEMORAL VEIN OF LEFT LOWER EXTREMITY (H): Primary | ICD-10-CM

## 2023-12-07 DIAGNOSIS — E78.5 HYPERLIPIDEMIA LDL GOAL <70: ICD-10-CM

## 2023-12-07 PROCEDURE — 99215 OFFICE O/P EST HI 40 MIN: CPT | Performed by: INTERNAL MEDICINE

## 2023-12-07 PROCEDURE — 93971 EXTREMITY STUDY: CPT | Mod: LT

## 2023-12-07 PROCEDURE — G0463 HOSPITAL OUTPT CLINIC VISIT: HCPCS | Mod: 25 | Performed by: INTERNAL MEDICINE

## 2023-12-07 NOTE — PATIENT INSTRUCTIONS
No blood clot , continue aspirin , use compression stockings and elevate leg when able     Continue current medications    Follow up in 6 months

## 2023-12-07 NOTE — PROGRESS NOTES
"Patient is here to discuss follow up    /84 (BP Location: Right arm, Patient Position: Chair, Cuff Size: Adult Regular)   Pulse 89   Ht 5' 2\" (1.575 m)   Wt 156 lb (70.8 kg)   SpO2 95%   BMI 28.53 kg/m      Questions patient would like addressed today are: N/A.    Refills are needed: No    Has homecare services and agency name:  [facility]the[facility]    "

## 2023-12-07 NOTE — PROGRESS NOTES
SUBJECTIVE:  CC:  Follow-up visit  History of left lower extremity superficial vein thrombosis and also old DVT ( resolved subsequently took xarelto appropriate duration )   Now left leg pain distal thigh and popliteal area , no injury   Worried about recurrent DVT  Takes aspirin daily      HPI:This is a very pleasant 78-year-old female with past medical history for diabetes, seasonal allergic rhinitis, hyperlipidemia, MIRZA uses CPAP machine and also mild intermittent asthma and depression , with known LLE DVT and SVT treated with xarelto now asa only .   C/o left leg pain X one month  She denied any fever, chills.  She does not recall previous DVTs or PEs and never been on blood thinners.  Non-smoker and no personal history of malignancy.  She is up-to-date with age-appropriate and gender appropriate cancer screening   repeat venous duplex 10/2022 no more  DVT      HISTORIES:  PROBLEM LIST:   Patient Active Problem List   Diagnosis    Obesity    Fibromyalgia    Advanced directives, counseling/discussion    Allergic rhinitis    Low back pain    MIRZA (obstructive sleep apnea)- on CPAP    Mild intermittent asthma without complication    Status post total right knee replacement    Type 2 diabetes mellitus without complication (H)    Hyperlipidemia LDL goal <100    Mild single current episode of major depressive disorder (H24)    Osteopenia    Mild intermittent asthma, unspecified whether complicated     PAST MEDICAL HISTORY:  Past Medical History:   Diagnosis Date    Acquired absence of organ, genital organs     Depressive disorder, not elsewhere classified     Diabetes (H)     Fibromyalgia     Gastroesophageal reflux disease     History of hormone replacement therapy     Hx musculoskletl dis NEC     Mastodynia     Noninfectious ileitis     IBS    Obesity     Osteoarthrosis, unspecified whether generalized or localized, hand     Other chronic pain     Other extrapyramidal disease and abnormal movement disorder     Other  nonspecific findings on examination of blood(790.99)     Pelvic kidney     Personal history of unspecified urinary disorder     Uncomplicated asthma     Unspecified sleep apnea     CPAP used     PAST SURGICAL HISTORY:  Past Surgical History:   Procedure Laterality Date    AMB ESOPHAGEAL MANOMETRY  10/2007    Normal    APPENDECTOMY  1992    ARTHROPLASTY KNEE Right 6/7/2016    Procedure: ARTHROPLASTY KNEE;  Surgeon: Jose Alberto Gomez MD;  Location: [facility]    CARPAL TUNNEL RELEASE RT/LT  05/02, 08/02    right 5/2002, left 8/2002    COLONOSCOPY  4/2009    Normal; repeat in 10 years    COLONOSCOPY N/A 11/4/2015    Procedure: COLONOSCOPY;  Surgeon: Cathie Melendez MD;  Location: [facility]    EXCHANGE POLY COMPONENT ARTHROPLASTY KNEE Right 9/5/2017    Procedure: EXCHANGE POLY COMPONENT ARTHROPLASTY KNEE;  1.  Right knee exploration and limited scar tissue excision.   2.  Tibial polyethylene insert exchange (after removal of the original polyethylene component and following the posterior cruciate ligament resection, a deep dish polyethylene #3 of 9 mm thickness was placed)     ;  Surgeon: Jose Alberto Gomez MD;  Location: [facility] OR    HYSTERECTOMY TOTAL ABDOMINAL, BILATERAL SALPINGO-OOPHORECTOMY, COMBINED  11/18/1992    Fibroids, endometriosis, Dr Rosas    JOINT REPLACEMENT  4/2010    bilateral thumb    PUNC/ASPIR BREAST CYST  11/2004    TONSILLECTOMY  1950     CURRENT MEDICATIONS:  Current Outpatient Medications   Medication Sig Dispense Refill    ARIPiprazole (ABILIFY) 2 MG tablet       blood glucose (CONTOUR NEXT TEST) test strip Use to test blood sugar 1 times daily or as directed. 100 strip 3    blood glucose monitoring (EMILY MICROLET) lancets Use to test blood sugar 2 times daily or as directed.  Ok to substitute alternative if insurance prefers. 100 each 3    calcium carbonate (OS-NETO) 1500 (600 Ca) MG tablet Take 1 tablet (600 mg) by mouth 2 times daily (with meals)      escitalopram (LEXAPRO) 20 MG tablet Take 1 tablet  (20 mg) by mouth daily 30 tablet 1    fexofenadine-pseudoePHEDrine (ALLEGRA-D)  MG per tablet Take 1 tablet by mouth 2 times daily as needed      fluticasone-salmeterol (ADVAIR DISKUS) 250-50 MCG/ACT inhaler Inhale 1 puff into the lungs every 12 hours 60 each 5    LORazepam (ATIVAN) 0.5 MG tablet TAKE 1 TABLET BY MOUTH  DAILY prn  2    MELATONIN PO Take 5 mg by mouth      metFORMIN (GLUCOPHAGE XR) 500 MG 24 hr tablet Take 2 tablets (1,000 mg) by mouth daily (with dinner) 180 tablet 3    montelukast (SINGULAIR) 10 MG tablet Take 1 tablet (10 mg) by mouth At Bedtime 90 tablet 3    simvastatin (ZOCOR) 40 MG tablet Take 1 tablet (40 mg) by mouth At Bedtime 90 tablet 3    vitamin D3 (CHOLECALCIFEROL) 2000 units (50 mcg) tablet Take 1 tablet (2,000 Units) by mouth daily      zolpidem (AMBIEN) 10 MG tablet 1 TABLET AT BEDTIME 30 tablet 0     ALLERGIES:  Allergies   Allergen Reactions    Ivp Dye [Contrast Dye] Shortness Of Breath    Claritin [Loratadine]      Hallucinations       SOCIAL HISTORY:  Social History     Socioeconomic History    Marital status:      Spouse name: Not on file    Number of children: 0    Years of education: 18    Highest education level: Not on file   Occupational History    Occupation: Psychologist     Employer: RETIRED     Comment: worked for [employer]   Tobacco Use    Smoking status: Former     Packs/day: 0.00     Years: 6.00     Additional pack years: 0.00     Total pack years: 0.00     Types: Cigarettes     Quit date: 1974     Years since quittin.5    Smokeless tobacco: Never    Tobacco comments:     Previous smoker, 1-1.5 packs per week   Substance and Sexual Activity    Alcohol use: Yes     Alcohol/week: 4.0 - 6.0 standard drinks of alcohol     Types: 4 - 6 Standard drinks or equivalent per week     Comment: rare    Drug use: No    Sexual activity: Not Currently     Birth control/protection: Post-menopausal   Other Topics Concern     Service Not  Asked    Blood Transfusions No    Caffeine Concern Not Asked    Occupational Exposure Not Asked    Hobby Hazards Not Asked    Sleep Concern Yes     Comment: has sleep apnea    Stress Concern Not Asked    Weight Concern Not Asked    Special Diet No    Back Care No    Exercise Yes     Comment: Some    Bike Helmet Not Asked     Comment: Does not ride    Seat Belt Yes    Self-Exams Not Asked    Parent/sibling w/ CABG, MI or angioplasty before 65F 55M? Not Asked   Social History Narrative    Eats fruits and vegetables every day. Calcium and vitamin D supplements recommended.      Social Determinants of Health     Financial Resource Strain: Not on file   Food Insecurity: Not on file   Transportation Needs: Not on file   Physical Activity: Not on file   Stress: Not on file   Social Connections: Not on file   Interpersonal Safety: Not on file   Housing Stability: Not on file     FAMILY HISTORY:  Family History   Problem Relation Age of Onset    Hypertension Father     Blood Disease Father         DVT's    Eye Disorder Father     Colon Cancer Father     Colon Cancer Paternal Uncle     Asthma Sister     C.A.D. Paternal Grandfather     Diabetes Maternal Aunt     Colon Cancer Maternal Uncle     Hyperlipidemia Paternal Grandmother     Hypertension Paternal Grandmother     Hyperlipidemia Other         Aunt    Hypertension Maternal Grandfather     Hypertension Other         Aunt    Breast Cancer No family hx of     Anesthesia Reaction No family hx of     Osteoporosis No family hx of     Thyroid Disease No family hx of      REVIEW OF SYSTEMS:  CONSTITUTIONAL:no malaise, fatigue, or other general symptoms  EYES: no subjective changes in visual acuity, no photophobia  ENT/MOUTH: no complaints of rhinorrhea, nasal congestion, sore throat, hearing changes  RESP:no SOB  CV: no c/o exertional chest pressure or GLASS  GI: No abdominal pain, constipation, change in bowel movements, nausea, pyrosis, BRBPR  :no polyuria or polydipsia, no  "dysuria, no gross hematuria  MUSCULOSKELATAL/vascular :   no arthalgias or myalgias, known history of bilateral lower extremity varicose veins, previous intervention  INTEGUMENTARY/SKIN: no pruritis, rashes, or moles with recent change in size, shape, or pigmentation  NEURO: no gross sensory or motor symptoms, no dizziness, no confusion  ENDOCRINE: no polyuria or polydipsia, no heat or cold intolerance  HEME/ALLERGY/IMMUNE: no fevers, chills, night sweats, or unwanted weight loss  PSYCHIATRIC: no depression, anxiety, or internal stimuli  EXAM:  /84 (BP Location: Right arm, Patient Position: Chair, Cuff Size: Adult Regular)   Pulse 89   Ht 5' 2\" (1.575 m)   Wt 156 lb (70.8 kg)   SpO2 95%   BMI 28.53 kg/m    BMI: Body mass index is 28.53 kg/m .  GENERAL APPEARANCE:  Pleasant  Healthy appearing male , alert, active, no distress cooperative.  EXAM:  EYES: clear conjunctiva, no cataracts, no obvious fundoscopic abnormalities  HENT: oropharynx, nares, and TMs are WNL  NECK: no JVD, thyromegaly or lymphadenopathy, no cervical bruits  RESP: clear to auscultation without rales, wheezes, or rhonchi  CV: RRR, no murmurs, gallops, or rubs  LYMPH: no cervical , axillary, or inguinal lymphadenopathy appreciated  GI: NABS, ND/NT, no masses or organomegally appreciated  MS: Vascular : Bilateral lower extremity varicose veins CEAP 4 CVI, no foot ulcers or leg ulcers  No edema.  Palpable symmetrical peripheral pulses  NEURO: CN II-XII intact, no localizing sensory or motor abnoramlities noted, DTRs symmetrical bilaterally  PSYCH: Mental status exam reveals the pt to have normal mood and affect. There is no disorder of thought form or content. There is no response to internal stimuli. There is no suicidal or homicidal ideation.  VENOUS ULTRASOUND LEFT LEG  10/12/2022 2:03 PM      HISTORY: calf pain with previous HX of DVT and sup vein thrombus off  of blood thinners ,eval; Thrombophlebitis leg; Chronic deep vein  thrombosis " (DVT) of femoral vein of left lower extremity (H)     COMPARISON: Ultrasound dated 3/2/2022     FINDINGS:  Examination of the deep veins with graded compression and  color flow Doppler with spectral wave form analysis was performed.   There is no evidence for DVT or superficial venous thrombus in the  left lower extremity.                                                                   IMPRESSION: No evidence of deep venous thrombosis.     GABINO TREVIÑO MD   A/P;  Left leg pain:  (I80.3)  Left LE Superficail vein thrombosis near calf and Thrombophlebitis  (primary encounter diagnosis)  (I83.893) Varicose veins of bilateral lower extremities with other complications  (I82.512) Chronic deep vein thrombosis (DVT) of femoral vein of left lower extremity (H), resolved     She has a bilateral lower extremity varicose veins left more than right side CEAP 4 CVI  Taking aspirin  I have obtained left lower extremity venous duplex in the clinic today there is no DVT  Continue to take aspirin  Continue to utilize compression stockings elevate the legs when able  If still continues to have problem will consider referring her to vein surgeon at vein [facility] for options of ablation       Follow up in 6 months   (E11.9) Type 2 diabetes mellitus without complication, without long-term current use of insulin (H)  Comment:   Plan: Tight control of diabetes     40 minutes spent on the date of the encounter doing chart review, history and exam, documentation and further activities as noted above  She was seen twice in the clinic today , initial evaluation and then followed by reevaluation after stat  venous imaging done in the clinic .  Personally reviewed imaging studies and discussed with the patient    Ronit Weiss MD,LUCAS, [facility]  Vascular medicine

## 2024-01-13 ENCOUNTER — HEALTH MAINTENANCE LETTER (OUTPATIENT)
Age: 79
End: 2024-01-13

## 2024-02-02 DIAGNOSIS — J45.20 MILD INTERMITTENT ASTHMA WITHOUT COMPLICATION: ICD-10-CM

## 2024-02-02 RX ORDER — FLUTICASONE PROPIONATE AND SALMETEROL 50; 250 UG/1; UG/1
1 POWDER RESPIRATORY (INHALATION) EVERY 12 HOURS
Qty: 60 EACH | Refills: 0 | Status: SHIPPED | OUTPATIENT
Start: 2024-02-02 | End: 2024-03-21

## 2024-02-12 DIAGNOSIS — J45.20 MILD INTERMITTENT ASTHMA WITHOUT COMPLICATION: ICD-10-CM

## 2024-02-12 RX ORDER — FLUTICASONE PROPIONATE AND SALMETEROL 250; 50 UG/1; UG/1
1 POWDER RESPIRATORY (INHALATION) EVERY 12 HOURS
Qty: 180 EACH | OUTPATIENT
Start: 2024-02-12

## 2024-03-06 ENCOUNTER — OFFICE VISIT (OUTPATIENT)
Dept: FAMILY MEDICINE | Facility: CLINIC | Age: 79
End: 2024-03-06
Payer: MEDICARE

## 2024-03-06 VITALS
DIASTOLIC BLOOD PRESSURE: 50 MMHG | HEART RATE: 84 BPM | BODY MASS INDEX: 28.72 KG/M2 | TEMPERATURE: 98 F | OXYGEN SATURATION: 97 % | WEIGHT: 157 LBS | SYSTOLIC BLOOD PRESSURE: 100 MMHG | RESPIRATION RATE: 16 BRPM

## 2024-03-06 DIAGNOSIS — F32.0 MILD SINGLE CURRENT EPISODE OF MAJOR DEPRESSIVE DISORDER (H): ICD-10-CM

## 2024-03-06 DIAGNOSIS — E11.9 TYPE 2 DIABETES MELLITUS WITHOUT COMPLICATION, UNSPECIFIED WHETHER LONG TERM INSULIN USE (H): ICD-10-CM

## 2024-03-06 DIAGNOSIS — M81.0 AGE-RELATED OSTEOPOROSIS WITHOUT CURRENT PATHOLOGICAL FRACTURE: ICD-10-CM

## 2024-03-06 DIAGNOSIS — R00.2 PALPITATIONS: Primary | ICD-10-CM

## 2024-03-06 LAB
ERYTHROCYTE [DISTWIDTH] IN BLOOD BY AUTOMATED COUNT: 13.3 % (ref 10–15)
HBA1C MFR BLD: 6.1 % (ref 0–5.6)
HCT VFR BLD AUTO: 39.7 % (ref 35–47)
HGB BLD-MCNC: 13.2 G/DL (ref 11.7–15.7)
MCH RBC QN AUTO: 29.1 PG (ref 26.5–33)
MCHC RBC AUTO-ENTMCNC: 33.2 G/DL (ref 31.5–36.5)
MCV RBC AUTO: 88 FL (ref 78–100)
PLATELET # BLD AUTO: 245 10E3/UL (ref 150–450)
RBC # BLD AUTO: 4.53 10E6/UL (ref 3.8–5.2)
WBC # BLD AUTO: 6.9 10E3/UL (ref 4–11)

## 2024-03-06 PROCEDURE — 99214 OFFICE O/P EST MOD 30 MIN: CPT | Performed by: FAMILY MEDICINE

## 2024-03-06 PROCEDURE — 83036 HEMOGLOBIN GLYCOSYLATED A1C: CPT | Performed by: FAMILY MEDICINE

## 2024-03-06 PROCEDURE — 36415 COLL VENOUS BLD VENIPUNCTURE: CPT | Performed by: FAMILY MEDICINE

## 2024-03-06 PROCEDURE — 80048 BASIC METABOLIC PNL TOTAL CA: CPT | Performed by: FAMILY MEDICINE

## 2024-03-06 PROCEDURE — 82306 VITAMIN D 25 HYDROXY: CPT | Performed by: FAMILY MEDICINE

## 2024-03-06 PROCEDURE — 93242 EXT ECG>48HR<7D RECORDING: CPT | Mod: 59 | Performed by: FAMILY MEDICINE

## 2024-03-06 PROCEDURE — 84443 ASSAY THYROID STIM HORMONE: CPT | Performed by: FAMILY MEDICINE

## 2024-03-06 PROCEDURE — 93000 ELECTROCARDIOGRAM COMPLETE: CPT | Performed by: FAMILY MEDICINE

## 2024-03-06 PROCEDURE — 85027 COMPLETE CBC AUTOMATED: CPT | Performed by: FAMILY MEDICINE

## 2024-03-06 ASSESSMENT — PATIENT HEALTH QUESTIONNAIRE - PHQ9
10. IF YOU CHECKED OFF ANY PROBLEMS, HOW DIFFICULT HAVE THESE PROBLEMS MADE IT FOR YOU TO DO YOUR WORK, TAKE CARE OF THINGS AT HOME, OR GET ALONG WITH OTHER PEOPLE: SOMEWHAT DIFFICULT
SUM OF ALL RESPONSES TO PHQ QUESTIONS 1-9: 3
SUM OF ALL RESPONSES TO PHQ QUESTIONS 1-9: 3

## 2024-03-06 ASSESSMENT — ASTHMA QUESTIONNAIRES
QUESTION_4 LAST FOUR WEEKS HOW OFTEN HAVE YOU USED YOUR RESCUE INHALER OR NEBULIZER MEDICATION (SUCH AS ALBUTEROL): NOT AT ALL
ACT_TOTALSCORE: 25
ACT_TOTALSCORE: 25
QUESTION_3 LAST FOUR WEEKS HOW OFTEN DID YOUR ASTHMA SYMPTOMS (WHEEZING, COUGHING, SHORTNESS OF BREATH, CHEST TIGHTNESS OR PAIN) WAKE YOU UP AT NIGHT OR EARLIER THAN USUAL IN THE MORNING: NOT AT ALL
QUESTION_1 LAST FOUR WEEKS HOW MUCH OF THE TIME DID YOUR ASTHMA KEEP YOU FROM GETTING AS MUCH DONE AT WORK, SCHOOL OR AT HOME: NONE OF THE TIME
QUESTION_5 LAST FOUR WEEKS HOW WOULD YOU RATE YOUR ASTHMA CONTROL: COMPLETELY CONTROLLED
QUESTION_2 LAST FOUR WEEKS HOW OFTEN HAVE YOU HAD SHORTNESS OF BREATH: NOT AT ALL

## 2024-03-06 ASSESSMENT — PAIN SCALES - GENERAL: PAINLEVEL: MILD PAIN (2)

## 2024-03-06 NOTE — PROGRESS NOTES
"  Assessment & Plan     Palpitations  Questionable etiology.  EKG and labs ordered for today.  Zio patch ordered as well.  Await the test results  - EKG 12-lead complete w/read - Clinics  - Basic metabolic panel; Future  - CBC with platelets; Future  - TSH with free T4 reflex; Future  - ZIO PATCH 3-7 DAYS (additional cost to patient)  - ZIO PATCH 3-7 DAYS APPLICATION  - Basic metabolic panel  - CBC with platelets  - TSH with free T4 reflex    Type 2 diabetes mellitus without complication, unspecified whether long term insulin use (H)    - Hemoglobin A1c; Future  - Hemoglobin A1c    Age-related osteoporosis without current pathological fracture  Noted to have low bone density for which I would recommend starting treatment but first she would need workup including labs as below.  Await the results.  We will probably consider using alendronate for her.  Continue calcium and vitamin D supplementation  - Vitamin D Deficiency; Future  - Basic metabolic panel; Future  - Vitamin D Deficiency  - Basic metabolic panel    Mild single current episode of major depressive disorder (H24)  Patient currently on Lexapro and Abilify.            BMI  Estimated body mass index is 28.72 kg/m  as calculated from the following:    Height as of 12/7/23: 1.575 m (5' 2\").    Weight as of this encounter: 71.2 kg (157 lb).             Breonna Sanford is a 78 year old, presenting for the following health issues:  Results (Dexa scan )        3/6/2024     1:20 PM   Additional Questions   Roomed by Rich Erazo - Discuss Bone Density Results     Complaint of noticing palpitations often in the last few weeks.  Symptoms are not associated with any dizziness, headache, visual problems, shortness of breath or chest pain.  Symptoms resolved without doing much.  She would like to get it checked out.  She is concerned about it.    History of type 2 diabetes.  She is due for recheck on hemoglobin A1c.        Review of " Systems  CONSTITUTIONAL: NEGATIVE for fever, chills, change in weight  ENT/MOUTH: NEGATIVE for ear, mouth and throat problems  RESP: NEGATIVE for significant cough or SOB  CV: NEGATIVE for chest pain or peripheral edema      Objective    /50   Pulse 84   Temp 98  F (36.7  C) (Tympanic)   Resp 16   Wt 71.2 kg (157 lb)   SpO2 97%   BMI 28.72 kg/m    Body mass index is 28.72 kg/m .  Physical Exam   GENERAL: alert and no distress  NECK: no adenopathy, no asymmetry, masses, or scars  RESP: lungs clear to auscultation - no rales, rhonchi or wheezes  CV: regular rate and rhythm, normal S1 S2, no S3 or S4, no murmur, click or rub, no peripheral edema  ABDOMEN: soft, nontender, no hepatosplenomegaly, no masses and bowel sounds normal  MS: no gross musculoskeletal defects noted, no edema            Signed Electronically by: Chata Wallace MD    Answers submitted by the patient for this visit:  Patient Health Questionnaire (Submitted on 3/6/2024)  If you checked off any problems, how difficult have these problems made it for you to do your work, take care of things at home, or get along with other people?: Somewhat difficult  PHQ9 TOTAL SCORE: 3  Back Pain Visit Questionnaire (Submitted on 3/6/2024)  Your back pain is: recurring  Chronic or Recurring Back Pain Visit Questionnaire (Submitted on 3/6/2024)  Where is your back pain located? : left lower back, right middle of back, left middle of back, left buttock, left hip, left side of waist  How would you describe your back pain? : dull ache, gnawing  Where does your back pain spread? : left buttocks, left thigh, left knee  Since you noticed your back pain, how has it changed? : always present, but gets better and worse  Does your back pain interfere with your job?: Not applicable  General Questionnaire (Submitted on 3/6/2024)  Chief Complaint: Chronic problems general questions HPI Form  How many servings of fruits and vegetables do you eat daily?: 0-1  On average,  how many sweetened beverages do you drink each day (Examples: soda, juice, sweet tea, etc.  Do NOT count diet or artificially sweetened beverages)?: 0  How many minutes a day do you exercise enough to make your heart beat faster?: 9 or less  How many days a week do you exercise enough to make your heart beat faster?: 3 or less  How many days per week do you miss taking your medication?: 0     no rossana

## 2024-03-06 NOTE — NURSING NOTE
Claribel Del Rio arrived here on 3/6/2024 2:26 PM for 7 days  Zio monitor placement per ordering provider Dr Rader for the diagnosis palpitations.  Patient s skin was prepped per protocol.  Zio monitor was placed.  Instructions were reviewed with and given to the patient.  Patient verbalized understanding of wear, troubleshooting and monitor return instructions.

## 2024-03-07 LAB
ANION GAP SERPL CALCULATED.3IONS-SCNC: 8 MMOL/L (ref 7–15)
BUN SERPL-MCNC: 26.8 MG/DL (ref 8–23)
CALCIUM SERPL-MCNC: 9.5 MG/DL (ref 8.8–10.2)
CHLORIDE SERPL-SCNC: 107 MMOL/L (ref 98–107)
CREAT SERPL-MCNC: 0.79 MG/DL (ref 0.51–0.95)
DEPRECATED HCO3 PLAS-SCNC: 28 MMOL/L (ref 22–29)
EGFRCR SERPLBLD CKD-EPI 2021: 77 ML/MIN/1.73M2
GLUCOSE SERPL-MCNC: 170 MG/DL (ref 70–99)
POTASSIUM SERPL-SCNC: 3.9 MMOL/L (ref 3.4–5.3)
SODIUM SERPL-SCNC: 143 MMOL/L (ref 135–145)
TSH SERPL DL<=0.005 MIU/L-ACNC: 1.2 UIU/ML (ref 0.3–4.2)
VIT D+METAB SERPL-MCNC: 56 NG/ML (ref 20–50)

## 2024-03-21 DIAGNOSIS — J45.20 MILD INTERMITTENT ASTHMA WITHOUT COMPLICATION: ICD-10-CM

## 2024-03-21 RX ORDER — FLUTICASONE PROPIONATE AND SALMETEROL 250; 50 UG/1; UG/1
1 POWDER RESPIRATORY (INHALATION) EVERY 12 HOURS
Qty: 60 EACH | Refills: 0 | Status: SHIPPED | OUTPATIENT
Start: 2024-03-21 | End: 2024-04-12

## 2024-03-25 PROCEDURE — 93244 EXT ECG>48HR<7D REV&INTERPJ: CPT | Performed by: INTERNAL MEDICINE

## 2024-04-02 ENCOUNTER — OFFICE VISIT (OUTPATIENT)
Dept: FAMILY MEDICINE | Facility: CLINIC | Age: 79
End: 2024-04-02
Payer: MEDICARE

## 2024-04-02 VITALS
TEMPERATURE: 96.4 F | RESPIRATION RATE: 16 BRPM | DIASTOLIC BLOOD PRESSURE: 67 MMHG | HEART RATE: 84 BPM | SYSTOLIC BLOOD PRESSURE: 105 MMHG | BODY MASS INDEX: 28.89 KG/M2 | WEIGHT: 157 LBS | HEIGHT: 62 IN | OXYGEN SATURATION: 96 %

## 2024-04-02 DIAGNOSIS — M81.0 OSTEOPOROSIS WITHOUT CURRENT PATHOLOGICAL FRACTURE, UNSPECIFIED OSTEOPOROSIS TYPE: ICD-10-CM

## 2024-04-02 DIAGNOSIS — G89.29 CHRONIC BILATERAL LOW BACK PAIN WITH LEFT-SIDED SCIATICA: Primary | ICD-10-CM

## 2024-04-02 DIAGNOSIS — Z23 NEED FOR PROPHYLACTIC VACCINATION AND INOCULATION AGAINST INFLUENZA: ICD-10-CM

## 2024-04-02 DIAGNOSIS — G89.29 CHRONIC BILATERAL THORACIC BACK PAIN: ICD-10-CM

## 2024-04-02 DIAGNOSIS — M54.6 CHRONIC BILATERAL THORACIC BACK PAIN: ICD-10-CM

## 2024-04-02 DIAGNOSIS — M48.061 SPINAL STENOSIS OF LUMBAR REGION, UNSPECIFIED WHETHER NEUROGENIC CLAUDICATION PRESENT: ICD-10-CM

## 2024-04-02 DIAGNOSIS — M54.42 CHRONIC BILATERAL LOW BACK PAIN WITH LEFT-SIDED SCIATICA: Primary | ICD-10-CM

## 2024-04-02 DIAGNOSIS — R00.2 PALPITATIONS: ICD-10-CM

## 2024-04-02 PROCEDURE — 99214 OFFICE O/P EST MOD 30 MIN: CPT | Mod: 25 | Performed by: FAMILY MEDICINE

## 2024-04-02 PROCEDURE — G0008 ADMIN INFLUENZA VIRUS VAC: HCPCS | Performed by: FAMILY MEDICINE

## 2024-04-02 PROCEDURE — 90662 IIV NO PRSV INCREASED AG IM: CPT | Performed by: FAMILY MEDICINE

## 2024-04-02 RX ORDER — ALENDRONATE SODIUM 70 MG/1
70 TABLET ORAL
Qty: 12 TABLET | Refills: 3 | Status: SHIPPED | OUTPATIENT
Start: 2024-04-02 | End: 2024-07-15

## 2024-04-02 ASSESSMENT — ENCOUNTER SYMPTOMS: BACK PAIN: 1

## 2024-04-02 ASSESSMENT — PAIN SCALES - GENERAL: PAINLEVEL: MILD PAIN (3)

## 2024-04-02 NOTE — PROGRESS NOTES
"  Assessment & Plan     Chronic bilateral low back pain with left-sided sciatica  Patient has been experiencing chronic low back pain bilaterally with left-sided sciatica for the last 6 months.  Recommending to proceed with MRI of the lumbar spine  - MR Lumbar Spine w/o Contrast; Future  - Physical Therapy  Referral; Future    Chronic bilateral thoracic back pain  Recommending physical therapy for upper and lower back pain  - Physical Therapy  Referral; Future    Osteoporosis without current pathological fracture, unspecified osteoporosis type  Patient has a diagnosis of osteoporosis.  Worsening of bone density noted as compared to the last DEXA scan.  Continue the use of calcium and vitamin D.  Recommending to do weightbearing exercises.  Starting the patient on Fosamax.  Side effect profile and mechanism of action and instructions on how to properly use the medication discussed in detail.  Patient agrees to the plan  - alendronate (FOSAMAX) 70 MG tablet; Take 1 tablet (70 mg) by mouth every 7 days    Need for prophylactic vaccination and inoculation against influenza    - INFLUENZA VACCINE 65+ (FLUZONE HD)    Palpitations  Patient had a 7-day Zio patch test done which shows that the patient triggered events were correlated to sinus rhythm with PVCs.  PAC burden was 7.7%.  And there were 8 short runs of SVTs the longest lasting 13 beats, but were not associated with any symptoms.  No sustained arrhythmia.  This was all reviewed with the patient.  I recommended her to consult with cardiology.  Patient would like to hold off on that for now.            BMI  Estimated body mass index is 28.72 kg/m  as calculated from the following:    Height as of this encounter: 1.575 m (5' 2\").    Weight as of this encounter: 71.2 kg (157 lb).             Breonna Sanford is a 78 year old, presenting for the following health issues:  Results (Dexa & Zio Patch ) and Back Pain (Lower  )        4/2/2024    12:44 PM " "  Additional Questions   Roomed by Trena   Accompanied by self     History of Present Illness       Back Pain:  She presents for follow up of back pain. Patient's back pain is a chronic problem.  Location of back pain:  Right lower back, left lower back, right middle of back, left middle of back, left shoulder, right hip and left hip  Description of back pain: gnawing  Back pain spreads: right buttocks, left buttocks, left thigh and left knee    Since patient first noticed back pain, pain is: always present, but gets better and worse  Does back pain interfere with her job:  Not applicable       She eats 0-1 servings of fruits and vegetables daily.She consumes 0 sweetened beverage(s) daily.She exercises with enough effort to increase her heart rate 9 or less minutes per day.  She exercises with enough effort to increase her heart rate 3 or less days per week.   She is taking medications regularly.     Also would like to review her bone density scan results from fall of last year.    She also had cardiac rhythm monitoring done for 7 days.  Would like to review the results that..  She tells that she continues to have palpitations.  She had 3 events reported during that cardiac monitoring.  She states that she did not get too many symptoms at that time.  She continues to have symptoms of palpitations off-and-on but no associated shortness of breath, chest pain or dizziness.  Otherwise she feels well            Review of Systems  CONSTITUTIONAL: NEGATIVE for fever, chills, change in weight  ENT/MOUTH: NEGATIVE for ear, mouth and throat problems  RESP: NEGATIVE for significant cough or SOB      Objective    /67 (BP Location: Right arm, Patient Position: Chair, Cuff Size: Adult Regular)   Pulse 84   Temp (!) 96.4  F (35.8  C) (Temporal)   Resp 16   Ht 1.575 m (5' 2\")   Wt 71.2 kg (157 lb)   SpO2 96%   BMI 28.72 kg/m    Body mass index is 28.72 kg/m .  Physical Exam   GENERAL: alert and no distress  NECK: no " adenopathy, no asymmetry, masses, or scars  RESP: lungs clear to auscultation - no rales, rhonchi or wheezes  CV: regular rate and rhythm, normal S1 S2, no S3 or S4, no murmur, click or rub, no peripheral edema  ABDOMEN: soft, nontender, no hepatosplenomegaly, no masses and bowel sounds normal  MS: no gross musculoskeletal defects noted, no edema            Signed Electronically by: Chata Wallace MD

## 2024-04-12 DIAGNOSIS — J45.20 MILD INTERMITTENT ASTHMA WITHOUT COMPLICATION: ICD-10-CM

## 2024-04-12 RX ORDER — FLUTICASONE PROPIONATE AND SALMETEROL 250; 50 UG/1; UG/1
1 POWDER RESPIRATORY (INHALATION) EVERY 12 HOURS
Qty: 60 EACH | Refills: 0 | Status: SHIPPED | OUTPATIENT
Start: 2024-04-12 | End: 2024-05-13

## 2024-04-19 ENCOUNTER — HOSPITAL ENCOUNTER (OUTPATIENT)
Dept: MRI IMAGING | Facility: CLINIC | Age: 79
Discharge: HOME | End: 2024-04-19
Attending: FAMILY MEDICINE | Admitting: FAMILY MEDICINE
Payer: MEDICARE

## 2024-04-19 DIAGNOSIS — G89.29 CHRONIC BILATERAL LOW BACK PAIN WITH LEFT-SIDED SCIATICA: ICD-10-CM

## 2024-04-19 DIAGNOSIS — M54.42 CHRONIC BILATERAL LOW BACK PAIN WITH LEFT-SIDED SCIATICA: ICD-10-CM

## 2024-04-19 PROCEDURE — G1010 CDSM STANSON: HCPCS

## 2024-04-23 ENCOUNTER — TELEPHONE (OUTPATIENT)
Dept: FAMILY MEDICINE | Facility: CLINIC | Age: 79
End: 2024-04-23
Payer: MEDICARE

## 2024-04-23 NOTE — TELEPHONE ENCOUNTER
"4/23/24 5:30pm   Per patient: I have received the message regarding my MRI results but I would like a nurse to call me back regarding the term \"moderate to severe spinal canal stenosis ' , as I do not understand this. Please call me back [phone]. Ok to [facility]/ anytime.   "

## 2024-04-23 NOTE — TELEPHONE ENCOUNTER
Patient Contact    Attempt # 1    Was Call answered? No    Non-detailed voicemail left to call clinic at: [phone].     On Call Back:    Please relay provider's message of MRI results. Message is available on [employer].      Dear Claribel,    MRI of the lumbar spine shows degenerative changes throughout the lumbar spine with moderate to severe spinal canal stenosis for which I would like you to see neurosurgery.  Referral to neurosurgery has been ordered.  Expect a call from them in the next few days.    Natasha VERDE RN  [facility] Triage Team

## 2024-04-23 NOTE — TELEPHONE ENCOUNTER
Reason for Call:  Request for results:    Name of test or procedure: MRI     Date of test of procedure: 4/19/24    Location of the test or procedure: [facility]    OK to leave the result message on voice mail or with a family member? no    Phone number Patient can be reached at:  Home number on file [phone] (home)    Additional comments: Patient requesting call back to go over MRI results. Needs clarifications.     Call taken on 4/23/2024 at 2:58 PM by Emilie Blair

## 2024-04-24 NOTE — TELEPHONE ENCOUNTER
Spinal stenosis, mild, moderate or severe correlates to the degree of narrowing of the central canal which in turn can compress onto the spinal cord or the nerve roots.  Moderate to severe means it is advanced enough that it will cause symptoms most likely and should be evaluated by neurosurgery.    Chata Wallace MD  [facility]

## 2024-04-24 NOTE — TELEPHONE ENCOUNTER
Spoke with pt and relayed providers message. Pt stated understanding and will call neurosurgery team back.     Erika Tatum RN

## 2024-04-25 NOTE — CONFIDENTIAL NOTE
NEUROSURGERY- NEW PREVISIT PLANNING       Record Status/Location     Referring Provider Referral Chata Wallace MD    Diagnosis Referral M48.061 (ICD-10-CM) - Spinal stenosis of lumbar region, unspecified whether neurogenic claudication present    MRI (HEAD, NECK, SPINE) Pacs Lumbar 4/19/24 [facility]    CT Pacs Pelvis 10/21/15 [facility]    X-ray Pacs Pelvis 10/12/23 [facility]    INJECTION Na    PHYSICAL THERAPY Encounters 2015   SURGERY Na        
07-Dec-2021

## 2024-04-29 ENCOUNTER — PRE VISIT (OUTPATIENT)
Dept: NEUROSURGERY | Facility: CLINIC | Age: 79
End: 2024-04-29

## 2024-04-29 ENCOUNTER — OFFICE VISIT (OUTPATIENT)
Dept: NEUROSURGERY | Facility: CLINIC | Age: 79
End: 2024-04-29
Attending: FAMILY MEDICINE
Payer: MEDICARE

## 2024-04-29 VITALS — OXYGEN SATURATION: 96 % | HEART RATE: 83 BPM | SYSTOLIC BLOOD PRESSURE: 99 MMHG | DIASTOLIC BLOOD PRESSURE: 61 MMHG

## 2024-04-29 DIAGNOSIS — M48.061 SPINAL STENOSIS OF LUMBAR REGION, UNSPECIFIED WHETHER NEUROGENIC CLAUDICATION PRESENT: ICD-10-CM

## 2024-04-29 PROCEDURE — G2211 COMPLEX E/M VISIT ADD ON: HCPCS | Performed by: NEUROLOGICAL SURGERY

## 2024-04-29 PROCEDURE — 99204 OFFICE O/P NEW MOD 45 MIN: CPT | Performed by: NEUROLOGICAL SURGERY

## 2024-04-29 ASSESSMENT — PAIN SCALES - GENERAL: PAINLEVEL: MODERATE PAIN (4)

## 2024-04-29 NOTE — PATIENT INSTRUCTIONS
Patient Next Steps:    Order placed for epidural steroid injection  The steroid can take 10-14 days to reach max effect  Please call our clinic if symptoms persist after this timeframe.  You can call [facility] to schedule your injection: [phone]    Order placed for physical therapy. You can call the phone number highlighted in the order to schedule your appointment. Please call our clinic if symptoms persist after your course of physical therapy.  If you have not heard from the scheduling office within 2 business days, please call [phone] for [facility], [phone] for Sedicidodici and [phone] for [facility].     Please call us if you have any further questions or concerns.     [facility]   Phone: [phone]  Fax: [phone]

## 2024-04-29 NOTE — LETTER
4/29/2024         RE: Claribel Del Rio  [address] [place] [address]        Dear Colleague,    Thank you for referring your patient, Claribel Del Rio, to the [facility]. Please see a copy of my visit note below.    I was asked by Dr. Wallace to see this patient in consultation    78 year old female with lumbar spondylolisthesis and stenosis, back and leg pain.  6 months of worsening, daily, throbbing low back pain, radiating to the left hip, posterior thigh, shin, and foot.  Worse when she tries to stand and walk and limits daily activities.  MR Lumbar, personally reviewed, with L4-5 spondylolisthesis and severe stenosis and L5-S1 left lateral recess stenosis.       Past Medical History:   Diagnosis Date     Acquired absence of organ, genital organs      Depressive disorder, not elsewhere classified      Diabetes (H)      Fibromyalgia      Gastroesophageal reflux disease      History of hormone replacement therapy      Hx musculoskletl dis NEC      Mastodynia      Noninfectious ileitis     IBS     Obesity      Osteoarthrosis, unspecified whether generalized or localized, hand      Other chronic pain      Other extrapyramidal disease and abnormal movement disorder      Other nonspecific findings on examination of blood(790.99)      Pelvic kidney      Personal history of unspecified urinary disorder      Uncomplicated asthma      Unspecified sleep apnea     CPAP used     Past Surgical History:   Procedure Laterality Date     AMB ESOPHAGEAL MANOMETRY  10/2007    Normal     APPENDECTOMY  1992     ARTHROPLASTY KNEE Right 6/7/2016    Procedure: ARTHROPLASTY KNEE;  Surgeon: Jose Alberto Gomez MD;  Location:  OR     CARPAL TUNNEL RELEASE RT/LT  05/02, 08/02    right 5/2002, left 8/2002     COLONOSCOPY  4/2009    Normal; repeat in 10 years     COLONOSCOPY N/A 11/4/2015    Procedure: COLONOSCOPY;  Surgeon: Cathie Melendez MD;  Location:  GI     EXCHANGE POLY COMPONENT  ARTHROPLASTY KNEE Right 2017    Procedure: EXCHANGE POLY COMPONENT ARTHROPLASTY KNEE;  1.  Right knee exploration and limited scar tissue excision.   2.  Tibial polyethylene insert exchange (after removal of the original polyethylene component and following the posterior cruciate ligament resection, a deep dish polyethylene #3 of 9 mm thickness was placed)     ;  Surgeon: Jose Alberto Gomez MD;  Location: [facility] OR     HYSTERECTOMY TOTAL ABDOMINAL, BILATERAL SALPINGO-OOPHORECTOMY, COMBINED  1992    Fibroids, endometriosis, Dr Rosas     JOINT REPLACEMENT  2010    bilateral thumb     PUNC/ASPIR BREAST CYST  2004     TONSILLECTOMY  1950     Social History     Socioeconomic History     Marital status:      Spouse name: Not on file     Number of children: 0     Years of education: 18     Highest education level: Not on file   Occupational History     Occupation: Psychologist     Employer: RETIRED     Comment: worked for [employer]   Tobacco Use     Smoking status: Former     Current packs/day: 0.00     Types: Cigarettes     Quit date: 1968     Years since quittin.9     Smokeless tobacco: Never     Tobacco comments:     Previous smoker, 1-1.5 packs per week   Vaping Use     Vaping status: Never Used   Substance and Sexual Activity     Alcohol use: Yes     Alcohol/week: 4.0 - 6.0 standard drinks of alcohol     Types: 4 - 6 Standard drinks or equivalent per week     Comment: rare     Drug use: No     Sexual activity: Not Currently     Birth control/protection: Post-menopausal   Other Topics Concern      Service Not Asked     Blood Transfusions No     Caffeine Concern Not Asked     Occupational Exposure Not Asked     Hobby Hazards Not Asked     Sleep Concern Yes     Comment: has sleep apnea     Stress Concern Not Asked     Weight Concern Not Asked     Special Diet No     Back Care No     Exercise Yes     Comment: Some     Bike Helmet Not Asked     Comment: Does not ride      Seat Belt Yes     Self-Exams Not Asked     Parent/sibling w/ CABG, MI or angioplasty before 65F 55M? Not Asked   Social History Narrative    Eats fruits and vegetables every day. Calcium and vitamin D supplements recommended.      Social Determinants of Health     Financial Resource Strain: Not on file   Food Insecurity: Not on file   Transportation Needs: Not on file   Physical Activity: Not on file   Stress: Not on file   Social Connections: Not on file   Interpersonal Safety: Low Risk  (3/6/2024)    Interpersonal Safety      Do you feel physically and emotionally safe where you currently live?: Yes      Within the past 12 months, have you been hit, slapped, kicked or otherwise physically hurt by someone?: No      Within the past 12 months, have you been humiliated or emotionally abused in other ways by your partner or ex-partner?: No   Housing Stability: Not on file     Family History   Problem Relation Age of Onset     Hypertension Father      Blood Disease Father         DVT's     Eye Disorder Father      Colon Cancer Father      Colon Cancer Paternal Uncle      Asthma Sister      C.A.D. Paternal Grandfather      Diabetes Maternal Aunt      Colon Cancer Maternal Uncle      Hyperlipidemia Paternal Grandmother      Hypertension Paternal Grandmother      Hyperlipidemia Other         Aunt     Hypertension Maternal Grandfather      Hypertension Other         Aunt     Breast Cancer No family hx of      Anesthesia Reaction No family hx of      Osteoporosis No family hx of      Thyroid Disease No family hx of         ROS: 10 point ROS neg other than the symptoms noted above in the HPI.    Physical Exam  BP 99/61   Pulse 83   SpO2 96%   HEENT:  Normocephalic, atraumatic.  PERRLA.  EOM s intact.  Visual fields full to gross exam  Neck:  Supple, non-tender, without lymphadenopathy.  Heart:  No peripheral edema  Lungs:  No SOB  Abdomen:  Non-distended.   Skin:  Warm and dry.  Extremities:  No edema, cyanosis or  clubbing.  Psychiatric:  No apparent distress  Musculoskeletal:  Normal bulk and tone    NEUROLOGICAL EXAMINATION:     Mental status:  Alert and Oriented x 3, speech is fluent.  Cranial nerves:  II-XII intact.   Motor:    Shoulder Abduction:  Right:  5/5   Left:  5/5  Biceps:                      Right:  5/5   Left:  5/5  Triceps:                     Right:  5/5   Left:  5/5  Wrist Extensors:       Right:  5/5   Left:  5/5  Wrist Flexors:           Right:  5/5   Left:  5/5  interosseus :            Right:  5/5   Left:  5/5  Hip Flexion:                Right: 5/5  Left:  5/5  Quadriceps:             Right:  5/5  Left:  5/5  Hamstrings:             Right:  5/5  Left:  5/5  Gastroc Soleus:        Right:  5/5  Left:  5/5  Tib/Ant:                      Right:  5/5  Left:  5/5  EHL:                     Right:  5/5  Left:  5/5  Sensation:  Intact  Reflexes:  Negative Babinski.  Negative Clonus.  Negative Gamboa's.  Coordination:  Smooth finger to nose testing.   Negative pronator drift.  Smooth tandem walking.    A/P:  78 year old female with lumbar spondylolisthesis and stenosis, back and leg pain    I had a discussion with the patient, reviewing the history, symptoms, and imaging  Will start course of PT  Left L4-5 and L5-S1 transforaminal SHON          Again, thank you for allowing me to participate in the care of your patient.        Sincerely,        Stone Jimenez MD

## 2024-04-29 NOTE — PROGRESS NOTES
I was asked by Dr. Wallace to see this patient in consultation    78 year old female with lumbar spondylolisthesis and stenosis, back and leg pain.  6 months of worsening, daily, throbbing low back pain, radiating to the left hip, posterior thigh, shin, and foot.  Worse when she tries to stand and walk and limits daily activities.  MR Antonio, personally reviewed, with L4-5 spondylolisthesis and severe stenosis and L5-S1 left lateral recess stenosis.       Past Medical History:   Diagnosis Date    Acquired absence of organ, genital organs     Depressive disorder, not elsewhere classified     Diabetes (H)     Fibromyalgia     Gastroesophageal reflux disease     History of hormone replacement therapy     Hx musculoskletl dis NEC     Mastodynia     Noninfectious ileitis     IBS    Obesity     Osteoarthrosis, unspecified whether generalized or localized, hand     Other chronic pain     Other extrapyramidal disease and abnormal movement disorder     Other nonspecific findings on examination of blood(790.99)     Pelvic kidney     Personal history of unspecified urinary disorder     Uncomplicated asthma     Unspecified sleep apnea     CPAP used     Past Surgical History:   Procedure Laterality Date    AMB ESOPHAGEAL MANOMETRY  10/2007    Normal    APPENDECTOMY  1992    ARTHROPLASTY KNEE Right 6/7/2016    Procedure: ARTHROPLASTY KNEE;  Surgeon: Jose Alberto Gomez MD;  Location: [facility] OR    CARPAL TUNNEL RELEASE RT/LT  05/02, 08/02    right 5/2002, left 8/2002    COLONOSCOPY  4/2009    Normal; repeat in 10 years    COLONOSCOPY N/A 11/4/2015    Procedure: COLONOSCOPY;  Surgeon: Cathie Melendez MD;  Location:  GI    EXCHANGE POLY COMPONENT ARTHROPLASTY KNEE Right 9/5/2017    Procedure: EXCHANGE POLY COMPONENT ARTHROPLASTY KNEE;  1.  Right knee exploration and limited scar tissue excision.   2.  Tibial polyethylene insert exchange (after removal of the original polyethylene component and following the posterior cruciate  ligament resection, a deep dish polyethylene #3 of 9 mm thickness was placed)     ;  Surgeon: Jose Alberto Gomez MD;  Location: [facility] OR    HYSTERECTOMY TOTAL ABDOMINAL, BILATERAL SALPINGO-OOPHORECTOMY, COMBINED  1992    Fibroids, endometriosis, Dr Rosas    JOINT REPLACEMENT  2010    bilateral thumb    PUNC/ASPIR BREAST CYST  2004    TONSILLECTOMY  1950     Social History     Socioeconomic History    Marital status:      Spouse name: Not on file    Number of children: 0    Years of education: 18    Highest education level: Not on file   Occupational History    Occupation: Psychologist     Employer: RETIRED     Comment: worked for [employer]   Tobacco Use    Smoking status: Former     Current packs/day: 0.00     Types: Cigarettes     Quit date: 1968     Years since quittin.9    Smokeless tobacco: Never    Tobacco comments:     Previous smoker, 1-1.5 packs per week   Vaping Use    Vaping status: Never Used   Substance and Sexual Activity    Alcohol use: Yes     Alcohol/week: 4.0 - 6.0 standard drinks of alcohol     Types: 4 - 6 Standard drinks or equivalent per week     Comment: rare    Drug use: No    Sexual activity: Not Currently     Birth control/protection: Post-menopausal   Other Topics Concern     Service Not Asked    Blood Transfusions No    Caffeine Concern Not Asked    Occupational Exposure Not Asked    Hobby Hazards Not Asked    Sleep Concern Yes     Comment: has sleep apnea    Stress Concern Not Asked    Weight Concern Not Asked    Special Diet No    Back Care No    Exercise Yes     Comment: Some    Bike Helmet Not Asked     Comment: Does not ride    Seat Belt Yes    Self-Exams Not Asked    Parent/sibling w/ CABG, MI or angioplasty before 65F 55M? Not Asked   Social History Narrative    Eats fruits and vegetables every day. Calcium and vitamin D supplements recommended.      Social Determinants of Health     Financial Resource Strain: Not on file   Food  Insecurity: Not on file   Transportation Needs: Not on file   Physical Activity: Not on file   Stress: Not on file   Social Connections: Not on file   Interpersonal Safety: Low Risk  (3/6/2024)    Interpersonal Safety     Do you feel physically and emotionally safe where you currently live?: Yes     Within the past 12 months, have you been hit, slapped, kicked or otherwise physically hurt by someone?: No     Within the past 12 months, have you been humiliated or emotionally abused in other ways by your partner or ex-partner?: No   Housing Stability: Not on file     Family History   Problem Relation Age of Onset    Hypertension Father     Blood Disease Father         DVT's    Eye Disorder Father     Colon Cancer Father     Colon Cancer Paternal Uncle     Asthma Sister     C.A.D. Paternal Grandfather     Diabetes Maternal Aunt     Colon Cancer Maternal Uncle     Hyperlipidemia Paternal Grandmother     Hypertension Paternal Grandmother     Hyperlipidemia Other         Aunt    Hypertension Maternal Grandfather     Hypertension Other         Aunt    Breast Cancer No family hx of     Anesthesia Reaction No family hx of     Osteoporosis No family hx of     Thyroid Disease No family hx of         ROS: 10 point ROS neg other than the symptoms noted above in the HPI.    Physical Exam  BP 99/61   Pulse 83   SpO2 96%   HEENT:  Normocephalic, atraumatic.  PERRLA.  EOM s intact.  Visual fields full to gross exam  Neck:  Supple, non-tender, without lymphadenopathy.  Heart:  No peripheral edema  Lungs:  No SOB  Abdomen:  Non-distended.   Skin:  Warm and dry.  Extremities:  No edema, cyanosis or clubbing.  Psychiatric:  No apparent distress  Musculoskeletal:  Normal bulk and tone    NEUROLOGICAL EXAMINATION:     Mental status:  Alert and Oriented x 3, speech is fluent.  Cranial nerves:  II-XII intact.   Motor:    Shoulder Abduction:  Right:  5/5   Left:  5/5  Biceps:                      Right:  5/5   Left:  5/5  Triceps:                      Right:  5/5   Left:  5/5  Wrist Extensors:       Right:  5/5   Left:  5/5  Wrist Flexors:           Right:  5/5   Left:  5/5  interosseus :            Right:  5/5   Left:  5/5  Hip Flexion:                Right: 5/5  Left:  5/5  Quadriceps:             Right:  5/5  Left:  5/5  Hamstrings:             Right:  5/5  Left:  5/5  Gastroc Soleus:        Right:  5/5  Left:  5/5  Tib/Ant:                      Right:  5/5  Left:  5/5  EHL:                     Right:  5/5  Left:  5/5  Sensation:  Intact  Reflexes:  Negative Babinski.  Negative Clonus.  Negative Gamboa's.  Coordination:  Smooth finger to nose testing.   Negative pronator drift.  Smooth tandem walking.    A/P:  78 year old female with lumbar spondylolisthesis and stenosis, back and leg pain    I had a discussion with the patient, reviewing the history, symptoms, and imaging  Will start course of PT  Left L4-5 and L5-S1 transforaminal SHON

## 2024-04-29 NOTE — NURSING NOTE
"Claribel Del Rio is a 78 year old female who presents for:  Chief Complaint   Patient presents with    Consult     spinal stenosis         Initial Vitals:  BP 99/61   Pulse 83   SpO2 96%  Estimated body mass index is 28.72 kg/m  as calculated from the following:    Height as of 4/2/24: 5' 2\" (1.575 m).    Weight as of 4/2/24: 157 lb (71.2 kg).. There is no height or weight on file to calculate BSA. BP completed using cuff size: regular  Moderate Pain (4)    Nursing Comments:       Yudelka Lane    "

## 2024-05-01 DIAGNOSIS — T50.8X5A ALLERGIC REACTION TO CONTRAST DYE: ICD-10-CM

## 2024-05-01 DIAGNOSIS — Z29.9 PREVENTIVE MEDICATION THERAPY NEEDED: Primary | ICD-10-CM

## 2024-05-01 NOTE — TELEPHONE ENCOUNTER
No PA required, okay to schedule    Per Availity:  This product does not require prior authorization for any service.      Gisela SERVIN    [facility]

## 2024-05-01 NOTE — LETTER
[facility]  [address] [facility] [address]  [facility] [address]  [phone]      May 15, 2024    You are scheduled for the lumbar epidural steroid injection on June 12, 2024.   Please arrive at 2pm.     An IV is started before the procedure.  This is just for precaution.    Location:  [address]  [place] [address]      Contrast dye allergy pre-medication plan:  -At 0230 AM on 6/12/24.  (12 hours before procedure) take oral Medrol (methylprednisolone) 32mg   -At 1230PM on 6/12/24  (2 hours before procedure) take oral Medrol (methylprednisolone) 32mg   At 130PM on 6/12/24 (1 hour before procedure) take oral benadryl (diphenhydramine) 50mg       -Note:  Benadryl comes in 25mg tabs and 50mg tabs. Be sure to note that 50mg is the dose needed.    These instructions should also be on your prescription bottles.  Medrol and benadryl prescriptions were sent to your [facility].     A  is needed  You can eat and drink before your procedure.  If you are sick, have an infection and/or are on antibiotics let us know as the injection would need to be rescheduled.  Do not have any vaccines w/in 7 days of the procedure and until 7 days after the procedure.  For the COVID booster, do not have done w/in 7 days of the procedure and wait until 14 days after the injection.   Let us know if you have any further questions.    Krystin RN-BSN  [facility] [place]

## 2024-05-01 NOTE — TELEPHONE ENCOUNTER
"Screening Questions for Radiology Injections:    Injection to be done at which interventional clinic site? [facility]  [place]    If choosing [facility] for location, please inform patient:  \"[facility] is a Hospital based clinic. Before your visit, you should check with your insurance about how it covers the charges for facility services in a hospital-based clinic.     Procedure ordered by     Procedure ordered? Left L4-5 L5-S1 TFESI   Transforaminal Cervical SHON - Send to [facility] - No [facility] providers perform this procedure    What insurance would patient like us to bill for this procedure? BC & Medicare   IF SCHEDULING IN [place] PAIN OR SPINE PLEASE SCHEDULE AT LEAST 7-10 BUSINESS DAYS OUT SO A PA CAN BE OBTAINED  Worker's comp or MVA (motor vehicle accident) -Any injection DO NOT SCHEDULE and route to Annie Butler.    [employer] insurance - For SI joint injections, DO NOT SCHEDULE and route to Gisela Ríos.     ALL BCBS, Hum[employer] and [employer] CIGNA - DO NOT SCHEDULE and route to Gisela Ríos  MEDICA- ALL INJECTIONS- route to Gisela Ríos    Is patient scheduled at [facility]?    If YES, route every encounter to [facility] SPINE CENTER CARE NAVIGATION POOL [[phone]]    Is an  needed? No     Patient has a  home? (Review Grid) YES: informed     Any chance of pregnancy? Not Applicable   If YES, do NOT schedule and route to RN pool  - Dr. Awad route to Erika Holland and PM&R Nurse  [[id number]]      Is patient actively being treated for cancer or immunocompromised? No  If YES, do NOT schedule and route to RN pool/ Dr. Awad's Team    Does the patient have a bleeding or clotting disorder? No   If YES, okay to schedule AND route to RN  / Dr. Awad's Team   (For any patients with platelet count <100, RN must forward to provider)    Is patient taking any Blood Thinners OR Antiplatelet medication?  No   If hold needed, do NOT schedule, route to RN pool/ Dr." Delmi's Team  Examples:   Blood Thinners: (Coumadin, Warfarin, Jantoven, Pradaxa, Xarelto, Eliquis, Edoxaban, Enoxaparin, Lovenox, Heparin, Arixtra, Fondaparinux or Fragmin)  Antiplatelet Medications: (Plavix, Brilinta or Effient)     Is patient taking any aspirin products (includes Excedrin and Fiorinal)? 81- yes   If yes route to RN pool/ Dr. Awad's Team - Do not schedule      Any allergies to contrast dye, iodine, shellfish, or numbing and steroid medications? Yes - contrast dye   If YES, schedule and add allergy information to appointment notes AND route to the RN pool/ Dr. Awad's Team  If SHON and Contrast Dye / Iodine Allergy? DO NOT SCHEDULE, route to RN pool/ Dr. Awad's Team  Allergies: Ivp dye [contrast dye] and Claritin [loratadine]     Does patient have an active infection or treated for one within the past week? No  Is patient currently taking any antibiotics or steroid medications?  No   For patients on chronic, preventative, or prophylactic antibiotics, procedures may be scheduled.   For patients on antibiotics for active or recent infection, schedule 4 days after completed.  For patients on steroid medications, schedule 4 days after completed.     Has the patient had a flu shot or any other vaccinations within the past 7 days? No  If yes, explain that for the vaccine to work best they need to:     wait 1 week before and 1 week after getting any Vaccine  wait 1 week before and 2 weeks after getting any Covid Vaccine   If patient has concerns about the timing, send to RN pool/ Dr. Awad's Team    Does patient have an MRI/CT?  YES: 2024 Include Date and Check Procedure Scheduling Grid to see if required.  Was the MRI/CT done within the last 3 years?  Yes   If no route to RN Pool/ Dr. Awad's Team  If yes, where was the MRI/CT done? [place]    Refer to PACS Transmissions list for approved external locations and route to RN Pool High Priority/ Dr. Awad's Team  If MRI was not done at approved  external location do NOT schedule and route to RN pool/ Dr. Awad's Team    If patient has an imaging disc, the injection MAY be scheduled but patient must bring disc to appt or appt will be cancelled.    Is patient able to transfer to a procedure table with minimal or no assistance? Yes   If no, do NOT schedule and route to RN Pool/ Dr. Awad's Team    Procedure Specific Instructions:  If celiac plexus block, informed patient NPO for 6 hours and that it is okay to take medications with sips of water, especially blood pressure medications Not Applicable       If this is for a cervical procedure, informed patient that aspirin needs to be held for 6 days.   Not Applicable    Sedation, If Sedation is ordered for any procedure, patient must be NPO for 6 hours prior to procedure Not Applicable    If IV needed:  Do not schedule procedures requiring IV placement in the first appointment of the day or first appointment after lunch. Do NOT schedule at 0745, 0815 or 1245.   Instructed patient to arrive 30 minutes early for IV start if required. (Check Procedure Scheduling Grid)      Reminders:  If you are started on any steroids or antibiotics between now and your appointment, you must contact us because the procedure may need to be cancelled.      As a reminder, receiving steroids can decrease your body's ability to fight infection.   Would you still like to move forward with scheduling the injection?      IV Sedation is not provided for procedures. If oral anti-anxiety medication is needed, the patient should request this from their referring provider.    Instruct patient to arrive as directed prior to the scheduled appointment time:  If IV needed 30 minutes before appointment time     For patients 85 or older we recommend having an adult stay w/ them for the remainder of the day.     If the patient is Diabetic, remind them to bring their glucometer.      Does the patient have any questions?  NO  Loly Melara  [facility]

## 2024-05-01 NOTE — TELEPHONE ENCOUNTER
Nursing to schedule. Pt has a contrast dye allergy.  Will await pt call back.      Krystin RN-BSN  [facility]   [phone]

## 2024-05-06 NOTE — TELEPHONE ENCOUNTER
Pt calling to check the status of scheduling her injection.          Annie Butler  Complex   [facility]  Pain Management

## 2024-05-09 ENCOUNTER — TELEPHONE (OUTPATIENT)
Dept: FAMILY MEDICINE | Facility: CLINIC | Age: 79
End: 2024-05-09
Payer: MEDICARE

## 2024-05-09 NOTE — TELEPHONE ENCOUNTER
Forms/Letter Request    Type of form/letter:   Outpatient PT Plan of Care  Back/lower quarter initial evaluation    Effective 5/6/24  Plan ID # [id number]    Do we have the form/letter: Yes, red folder, Dr. Wallace's inbox.     Who is the form from? [facility]     Where did/will the form come from? form was faxed in    When is form/letter needed by: As able    How would you like the form/letter returned:   Fax to [phone]     Zina Hope on 5/9/2024 at 6:06 PM

## 2024-05-11 DIAGNOSIS — J45.20 MILD INTERMITTENT ASTHMA WITHOUT COMPLICATION: ICD-10-CM

## 2024-05-11 DIAGNOSIS — E11.9 TYPE 2 DIABETES MELLITUS WITHOUT COMPLICATION, WITHOUT LONG-TERM CURRENT USE OF INSULIN (H): ICD-10-CM

## 2024-05-13 DIAGNOSIS — J45.20 MILD INTERMITTENT ASTHMA WITHOUT COMPLICATION: ICD-10-CM

## 2024-05-13 RX ORDER — FLUTICASONE PROPIONATE AND SALMETEROL 250; 50 UG/1; UG/1
1 POWDER RESPIRATORY (INHALATION) EVERY 12 HOURS
Qty: 60 EACH | Refills: 0 | Status: SHIPPED | OUTPATIENT
Start: 2024-05-13 | End: 2024-06-10

## 2024-05-14 RX ORDER — MONTELUKAST SODIUM 10 MG/1
1 TABLET ORAL AT BEDTIME
Qty: 90 TABLET | Refills: 3 | OUTPATIENT
Start: 2024-05-14

## 2024-05-14 NOTE — TELEPHONE ENCOUNTER
Form signed by Dr. Wallace and faxed to [facility] at [phone].    [facility] and filed team [facility].     Zina Hope on 5/14/2024 at 3:08 PM

## 2024-05-15 RX ORDER — DIPHENHYDRAMINE HCL 50 MG
CAPSULE ORAL
Qty: 1 CAPSULE | Refills: 0 | Status: SHIPPED | OUTPATIENT
Start: 2024-05-15 | End: 2024-07-15

## 2024-05-15 RX ORDER — METHYLPREDNISOLONE 32 MG/1
TABLET ORAL
Qty: 2 TABLET | Refills: 0 | Status: SHIPPED | OUTPATIENT
Start: 2024-05-15 | End: 2024-07-15

## 2024-05-15 NOTE — TELEPHONE ENCOUNTER
"Contrast dye allergy reaction:  SOB many years ago.  Does not remember   Is premedicating needed? Yes     Injection scheduled for:  6/12/24 @ 1430. Arrive 30\" early for IV.     Pharmacy: [facility] in [place]  Were prescriptions prepped for Dr. Mesa to review and sign?  YES    Contrast dye allergy pre-medication plan:  Take oral Medrol 32mg 12 hours before procedure time   Take oral Medrol 32mg again 2 hours before procedure time   Take oral benadryl (diphenhydramine) 50mg 1 hour before procedure time.                 Note:  Benadryl usually comes in 25mg tabs so it would be 2 tabs.    Was the above relayed to pt?: Yes   Does pt want instructions mycharted/mailed to him/her?   Yes mailed to her per pt request  Injection intake questions reviewed?  YES  Infection/antibiotic information reviewed?  YES  Location confirmed: :Yes [facility]. Address given and she knows this location  Is pt arriving early?:Yes 30 minutes  Vaccine policy discussed in full?: YES      Routed to provider to review and sign off on meds.    Krystin RN-BSN  [facility]   [phone]      "

## 2024-06-10 DIAGNOSIS — J45.20 MILD INTERMITTENT ASTHMA WITHOUT COMPLICATION: ICD-10-CM

## 2024-06-10 RX ORDER — FLUTICASONE PROPIONATE AND SALMETEROL 250; 50 UG/1; UG/1
1 POWDER RESPIRATORY (INHALATION) EVERY 12 HOURS
Qty: 60 EACH | Refills: 2 | Status: SHIPPED | OUTPATIENT
Start: 2024-06-10 | End: 2024-07-15

## 2024-06-13 DIAGNOSIS — J45.20 MILD INTERMITTENT ASTHMA WITHOUT COMPLICATION: ICD-10-CM

## 2024-06-14 RX ORDER — MONTELUKAST SODIUM 10 MG/1
1 TABLET ORAL AT BEDTIME
Qty: 90 TABLET | Refills: 2 | Status: SHIPPED | OUTPATIENT
Start: 2024-06-14 | End: 2024-07-15

## 2024-07-10 DIAGNOSIS — E78.5 HYPERLIPIDEMIA LDL GOAL <100: ICD-10-CM

## 2024-07-10 DIAGNOSIS — E11.9 TYPE 2 DIABETES MELLITUS WITHOUT COMPLICATION, WITHOUT LONG-TERM CURRENT USE OF INSULIN (H): ICD-10-CM

## 2024-07-11 NOTE — TELEPHONE ENCOUNTER
Prescription approved per [facility] Refill Protocol.  Susan Eastman, RN  [facility] Triage Nurse

## 2024-07-15 ENCOUNTER — OFFICE VISIT (OUTPATIENT)
Dept: FAMILY MEDICINE | Facility: CLINIC | Age: 79
End: 2024-07-15
Payer: MEDICARE

## 2024-07-15 ENCOUNTER — ANCILLARY PROCEDURE (OUTPATIENT)
Dept: GENERAL RADIOLOGY | Facility: CLINIC | Age: 79
End: 2024-07-15
Attending: FAMILY MEDICINE
Payer: MEDICARE

## 2024-07-15 VITALS
DIASTOLIC BLOOD PRESSURE: 68 MMHG | HEART RATE: 73 BPM | RESPIRATION RATE: 20 BRPM | BODY MASS INDEX: 28.95 KG/M2 | HEIGHT: 62 IN | WEIGHT: 157.3 LBS | SYSTOLIC BLOOD PRESSURE: 109 MMHG | OXYGEN SATURATION: 99 % | TEMPERATURE: 97.5 F

## 2024-07-15 DIAGNOSIS — Z23 NEED FOR COVID-19 VACCINE: ICD-10-CM

## 2024-07-15 DIAGNOSIS — G89.29 CHRONIC BILATERAL LOW BACK PAIN WITH LEFT-SIDED SCIATICA: ICD-10-CM

## 2024-07-15 DIAGNOSIS — M25.512 CHRONIC LEFT SHOULDER PAIN: ICD-10-CM

## 2024-07-15 DIAGNOSIS — G89.29 CHRONIC LEFT SHOULDER PAIN: ICD-10-CM

## 2024-07-15 DIAGNOSIS — G89.29 CHRONIC BILATERAL THORACIC BACK PAIN: ICD-10-CM

## 2024-07-15 DIAGNOSIS — Z00.00 ENCOUNTER FOR ANNUAL WELLNESS EXAM IN MEDICARE PATIENT: Primary | ICD-10-CM

## 2024-07-15 DIAGNOSIS — M54.42 CHRONIC BILATERAL LOW BACK PAIN WITH LEFT-SIDED SCIATICA: ICD-10-CM

## 2024-07-15 DIAGNOSIS — M81.0 OSTEOPOROSIS WITHOUT CURRENT PATHOLOGICAL FRACTURE, UNSPECIFIED OSTEOPOROSIS TYPE: ICD-10-CM

## 2024-07-15 DIAGNOSIS — E78.5 HYPERLIPIDEMIA LDL GOAL <100: ICD-10-CM

## 2024-07-15 DIAGNOSIS — E11.9 TYPE 2 DIABETES MELLITUS WITHOUT COMPLICATION, WITHOUT LONG-TERM CURRENT USE OF INSULIN (H): ICD-10-CM

## 2024-07-15 DIAGNOSIS — M54.6 CHRONIC BILATERAL THORACIC BACK PAIN: ICD-10-CM

## 2024-07-15 DIAGNOSIS — J45.20 MILD INTERMITTENT ASTHMA WITHOUT COMPLICATION: ICD-10-CM

## 2024-07-15 LAB — HBA1C MFR BLD: 6 % (ref 0–5.6)

## 2024-07-15 PROCEDURE — 91320 SARSCV2 VAC 30MCG TRS-SUC IM: CPT | Performed by: FAMILY MEDICINE

## 2024-07-15 PROCEDURE — 36415 COLL VENOUS BLD VENIPUNCTURE: CPT | Performed by: FAMILY MEDICINE

## 2024-07-15 PROCEDURE — 80053 COMPREHEN METABOLIC PANEL: CPT | Performed by: FAMILY MEDICINE

## 2024-07-15 PROCEDURE — 90480 ADMN SARSCOV2 VAC 1/ONLY CMP: CPT | Performed by: FAMILY MEDICINE

## 2024-07-15 PROCEDURE — G0439 PPPS, SUBSEQ VISIT: HCPCS | Performed by: FAMILY MEDICINE

## 2024-07-15 PROCEDURE — 83036 HEMOGLOBIN GLYCOSYLATED A1C: CPT | Performed by: FAMILY MEDICINE

## 2024-07-15 PROCEDURE — 82043 UR ALBUMIN QUANTITATIVE: CPT | Performed by: FAMILY MEDICINE

## 2024-07-15 PROCEDURE — 82570 ASSAY OF URINE CREATININE: CPT | Performed by: FAMILY MEDICINE

## 2024-07-15 PROCEDURE — 73030 X-RAY EXAM OF SHOULDER: CPT | Mod: TC | Performed by: RADIOLOGY

## 2024-07-15 PROCEDURE — 99214 OFFICE O/P EST MOD 30 MIN: CPT | Mod: 25 | Performed by: FAMILY MEDICINE

## 2024-07-15 PROCEDURE — 80061 LIPID PANEL: CPT | Performed by: FAMILY MEDICINE

## 2024-07-15 RX ORDER — SIMVASTATIN 40 MG
40 TABLET ORAL AT BEDTIME
Qty: 90 TABLET | Refills: 3 | Status: SHIPPED | OUTPATIENT
Start: 2024-07-15

## 2024-07-15 RX ORDER — MONTELUKAST SODIUM 10 MG/1
1 TABLET ORAL AT BEDTIME
Qty: 90 TABLET | Refills: 3 | Status: SHIPPED | OUTPATIENT
Start: 2024-07-15

## 2024-07-15 RX ORDER — FLUTICASONE PROPIONATE AND SALMETEROL 250; 50 UG/1; UG/1
1 POWDER RESPIRATORY (INHALATION) EVERY 12 HOURS
Qty: 60 EACH | Refills: 11 | Status: SHIPPED | OUTPATIENT
Start: 2024-07-15

## 2024-07-15 RX ORDER — METFORMIN HCL 500 MG
1000 TABLET, EXTENDED RELEASE 24 HR ORAL
Qty: 180 TABLET | Refills: 3 | Status: SHIPPED | OUTPATIENT
Start: 2024-07-15

## 2024-07-15 RX ORDER — ALENDRONATE SODIUM 70 MG/1
70 TABLET ORAL
Qty: 12 TABLET | Refills: 3 | Status: SHIPPED | OUTPATIENT
Start: 2024-07-15

## 2024-07-15 RX ORDER — SIMVASTATIN 40 MG
40 TABLET ORAL AT BEDTIME
Qty: 90 TABLET | Refills: 1 | Status: SHIPPED | OUTPATIENT
Start: 2024-07-15

## 2024-07-15 SDOH — HEALTH STABILITY: PHYSICAL HEALTH: ON AVERAGE, HOW MANY DAYS PER WEEK DO YOU ENGAGE IN MODERATE TO STRENUOUS EXERCISE (LIKE A BRISK WALK)?: 6 DAYS

## 2024-07-15 ASSESSMENT — PATIENT HEALTH QUESTIONNAIRE - PHQ9
SUM OF ALL RESPONSES TO PHQ QUESTIONS 1-9: 1
10. IF YOU CHECKED OFF ANY PROBLEMS, HOW DIFFICULT HAVE THESE PROBLEMS MADE IT FOR YOU TO DO YOUR WORK, TAKE CARE OF THINGS AT HOME, OR GET ALONG WITH OTHER PEOPLE: NOT DIFFICULT AT ALL
SUM OF ALL RESPONSES TO PHQ QUESTIONS 1-9: 1

## 2024-07-15 ASSESSMENT — PAIN SCALES - GENERAL: PAINLEVEL: NO PAIN (0)

## 2024-07-15 ASSESSMENT — SOCIAL DETERMINANTS OF HEALTH (SDOH): HOW OFTEN DO YOU GET TOGETHER WITH FRIENDS OR RELATIVES?: THREE TIMES A WEEK

## 2024-07-15 NOTE — PROGRESS NOTES
Preventive Care Visit  [facility] CHRISTELLE Wallace MD, Family Medicine  Jul 15, 2024      Assessment & Plan     Encounter for annual wellness exam in Medicare patient      Chronic bilateral thoracic back pain  Patient requested referral to physical therapy for back pain  - Physical Therapy  Referral; Future    Chronic bilateral low back pain with left-sided sciatica    - Physical Therapy  Referral; Future    Chronic left shoulder pain  X-ray of the shoulder shows no significant fracture or arthritis.  Alignment is normal.  Recommending trial of physical therapy.  - XR Shoulder Left G/E 3 Views; Future  - Physical Therapy  Referral; Future    Mild intermittent asthma without complication  Well-managed  - fluticasone-salmeterol (ADVAIR) 250-50 MCG/ACT inhaler; Inhale 1 puff into the lungs every 12 hours  - montelukast (SINGULAIR) 10 MG tablet; Take 1 tablet (10 mg) by mouth at bedtime    Osteoporosis without current pathological fracture, unspecified osteoporosis type  Bone density scan was done last year.  Continue the use of Fosamax along with calcium and vitamin D  - alendronate (FOSAMAX) 70 MG tablet; Take 1 tablet (70 mg) by mouth every 7 days    Type 2 diabetes mellitus without complication, without long-term current use of insulin (H)  Well controlled  - metFORMIN (GLUCOPHAGE XR) 500 MG 24 hr tablet; Take 2 tablets (1,000 mg) by mouth daily (with dinner)  - Comprehensive metabolic panel; Future  - Hemoglobin A1c; Future  - Albumin Random Urine Quantitative with Creat Ratio; Future  - Comprehensive metabolic panel  - Hemoglobin A1c  - Albumin Random Urine Quantitative with Creat Ratio    Hyperlipidemia LDL goal <100  Well-controlled   - simvastatin (ZOCOR) 40 MG tablet; Take 1 tablet (40 mg) by mouth at bedtime  - Lipid panel reflex to direct LDL Fasting; Future  - Comprehensive metabolic panel; Future  - Lipid panel reflex to direct LDL Fasting  - Comprehensive  "metabolic panel    Need for COVID-19 vaccine    - COVID-19 12+ (2023-24) (PFIZER)            BMI  Estimated body mass index is 28.95 kg/m  as calculated from the following:    Height as of this encounter: 1.57 m (5' 1.81\").    Weight as of this encounter: 71.4 kg (157 lb 4.8 oz).       Counseling  Appropriate preventive services were addressed with this patient via screening, questionnaire, or discussion as appropriate for fall prevention, nutrition, physical activity, Tobacco-use cessation, weight loss and cognition.  Checklist reviewing preventive services available has been given to the patient.            Breonna Sanford is a 79 year old, presenting for the following:  Physical (Fasting) and Shoulder Pain (Left shoulder pain, unable to reach or raise arm, six months, no treatment tried. )        7/15/2024    12:53 PM   Additional Questions   Roomed by Carisa HILL         Health Care Directive  Patient does not have a Health Care Directive or Living Will: Patient states has Advance Directive and will bring in a copy to clinic.    Shoulder Pain  This is a new problem.         Diabetes Follow-up    How often are you checking your blood sugar? One time daily  What time of day are you checking your blood sugars (select all that apply)?  At bedtime  Have you had any blood sugars above 200?  No  Have you had any blood sugars below 70?  No  What symptoms do you notice when your blood sugar is low?  None  What concerns do you have today about your diabetes? None   Do you have any of these symptoms? (Select all that apply)  No numbness or tingling in feet.  No redness, sores or blisters on feet.  No complaints of excessive thirst.  No reports of blurry vision.  No significant changes to weight.      BP Readings from Last 2 Encounters:   07/15/24 109/68   04/29/24 99/61     Hemoglobin A1C (%)   Date Value   07/15/2024 6.0 (H)   03/06/2024 6.1 (H)   03/31/2021 6.9 (H)   11/02/2020 7.5 (H)     LDL Cholesterol Calculated " (mg/dL)   Date Value   07/15/2024 81   07/05/2023 65   03/31/2021 79   02/03/2020 80         Pain History:  When did you first notice your pain? 6 months ago   Have you seen this provider for your pain in the past? No   Where in your body do your have pain? Top left shoulder  Are you seeing anyone else for your pain? No  What makes your pain better? Resting  What makes your pain worse? Raising arm  How has pain affected your ability to work? Not currently working - unrelated to pain  Who lives in your household? No one        7/5/2023    12:31 PM 3/6/2024     1:02 PM 7/15/2024    12:15 PM   PHQ-9 SCORE   PHQ-9 Total Score MyChart 4 (Minimal depression) 3 (Minimal depression) 1 (Minimal depression)   PHQ-9 Total Score 4 3 1               7/15/2024   General Health   How would you rate your overall physical health? Good   Feel stress (tense, anxious, or unable to sleep) Not at all            7/15/2024   Nutrition   Diet: Diabetic    Breakfast skipped       Multiple values from one day are sorted in reverse-chronological order         7/15/2024   Exercise   Days per week of moderate/strenous exercise 6 days            7/15/2024   Social Factors   Frequency of gathering with friends or relatives Three times a week   Worry food won't last until get money to buy more No   Food not last or not have enough money for food? No   Do you have housing? (Housing is defined as stable permanent housing and does not include staying ouside in a car, in a tent, in an abandoned building, in an overnight shelter, or couch-surfing.) Yes   Are you worried about losing your housing? No   Lack of transportation? No   Unable to get utilities (heat,electricity)? No            7/15/2024   Fall Risk   Fallen 2 or more times in the past year? No   Trouble with walking or balance? No             7/15/2024   Activities of Daily Living- Home Safety   Needs help with the following daily activites Housework   Safety concerns in the home None of the  above            7/15/2024   Dental   Dentist two times every year? (!) NO            7/15/2024   Hearing Screening   Hearing concerns? None of the above            7/15/2024   Driving Risk Screening   Patient/family members have concerns about driving No            7/15/2024   General Alertness/Fatigue Screening   Have you been more tired than usual lately? No            7/15/2024   Urinary Incontinence Screening   Bothered by leaking urine in past 6 months Yes            7/15/2024   TB Screening   Were you born outside of the [place]? No          Today's PHQ-9 Score:       7/15/2024    12:15 PM   PHQ-9 SCORE   PHQ-9 Total Score MyChart 1 (Minimal depression)   PHQ-9 Total Score 1         7/15/2024   Substance Use   Alcohol more than 3/day or more than 7/wk No   Do you have a current opioid prescription? No   How severe/bad is pain from 1 to 10? 5/10   Do you use any other substances recreationally? No        Social History     Tobacco Use    Smoking status: Former     Current packs/day: 0.00     Types: Cigarettes     Quit date: 1968     Years since quittin.1    Smokeless tobacco: Never    Tobacco comments:     Previous smoker, 1-1.5 packs per week   Vaping Use    Vaping status: Never Used   Substance Use Topics    Alcohol use: Yes     Alcohol/week: 4.0 - 6.0 standard drinks of alcohol     Types: 4 - 6 Standard drinks or equivalent per week     Comment: rare    Drug use: No          Mammogram Screening - After age 74- determine frequency with patient based on health status, life expectancy and patient goals    ASCVD Risk   The 10-year ASCVD risk score (Kenisha DK, et al., 2019) is: 25.9%    Values used to calculate the score:      Age: 79 years      Sex: Female      Is Non- : No      Diabetic: Yes      Tobacco smoker: No      Systolic Blood Pressure: 109 mmHg      Is BP treated: No      HDL Cholesterol: 41 mg/dL      Total Cholesterol: 151 mg/dL            Reviewed and updated as  needed this visit by Provider    Allergies                 Patient Active Problem List   Diagnosis    Obesity    Fibromyalgia    Allergic rhinitis    Low back pain    MIRZA (obstructive sleep apnea)- on CPAP    Mild intermittent asthma without complication    Status post total right knee replacement    Type 2 diabetes mellitus without complication (H)    Hyperlipidemia LDL goal <100    Mild single current episode of major depressive disorder (H24)    Osteopenia    Mild intermittent asthma, unspecified whether complicated     Past Surgical History:   Procedure Laterality Date    AMB ESOPHAGEAL MANOMETRY  10/2007    Normal    APPENDECTOMY  1992    ARTHROPLASTY KNEE Right 2016    Procedure: ARTHROPLASTY KNEE;  Surgeon: Jose Alberto Gomez MD;  Location: [facility] OR    CARPAL TUNNEL RELEASE RT/LT  ,     right 2002, left 2002    COLONOSCOPY  2009    Normal; repeat in 10 years    COLONOSCOPY N/A 2015    Procedure: COLONOSCOPY;  Surgeon: Cathie Melendez MD;  Location:  GI    EXCHANGE POLY COMPONENT ARTHROPLASTY KNEE Right 2017    Procedure: EXCHANGE POLY COMPONENT ARTHROPLASTY KNEE;  1.  Right knee exploration and limited scar tissue excision.   2.  Tibial polyethylene insert exchange (after removal of the original polyethylene component and following the posterior cruciate ligament resection, a deep dish polyethylene #3 of 9 mm thickness was placed)     ;  Surgeon: Jose Alberto Gomez MD;  Location: [facility] OR    HYSTERECTOMY TOTAL ABDOMINAL, BILATERAL SALPINGO-OOPHORECTOMY, COMBINED  1992    Fibroids, endometriosis, Dr Rosas    JOINT REPLACEMENT  2010    bilateral thumb    PUNC/ASPIR BREAST CYST  2004    TONSILLECTOMY  1950       Social History     Tobacco Use    Smoking status: Former     Current packs/day: 0.00     Types: Cigarettes     Quit date: 1968     Years since quittin.1    Smokeless tobacco: Never    Tobacco comments:     Previous smoker, 1-1.5 packs per week   Substance  Use Topics    Alcohol use: Yes     Alcohol/week: 4.0 - 6.0 standard drinks of alcohol     Types: 4 - 6 Standard drinks or equivalent per week     Comment: rare     Family History   Problem Relation Age of Onset    Hypertension Father     Blood Disease Father         DVT's    Eye Disorder Father     Colon Cancer Father     Colon Cancer Paternal Uncle     Asthma Sister     C.A.D. Paternal Grandfather     Diabetes Maternal Aunt     Colon Cancer Maternal Uncle     Hyperlipidemia Paternal Grandmother     Hypertension Paternal Grandmother     Hyperlipidemia Other         Aunt    Hypertension Maternal Grandfather     Hypertension Other         Aunt    Breast Cancer No family hx of     Anesthesia Reaction No family hx of     Osteoporosis No family hx of     Thyroid Disease No family hx of          Current Outpatient Medications   Medication Sig Dispense Refill    alendronate (FOSAMAX) 70 MG tablet Take 1 tablet (70 mg) by mouth every 7 days 12 tablet 3    blood glucose (CONTOUR NEXT TEST) test strip USE TO TEST BLOOD SUGAR 1 TIMES DAILY OR AS DIRECTED. 100 strip 2    blood glucose monitoring (EMILY MICROLET) lancets Use to test blood sugar 2 times daily or as directed.  Ok to substitute alternative if insurance prefers. 100 each 3    calcium carbonate (OS-NETO) 1500 (600 Ca) MG tablet Take 1 tablet (600 mg) by mouth 2 times daily (with meals)      escitalopram (LEXAPRO) 20 MG tablet Take 1 tablet (20 mg) by mouth daily 30 tablet 1    fluticasone-salmeterol (ADVAIR) 250-50 MCG/ACT inhaler Inhale 1 puff into the lungs every 12 hours 60 each 11    LORazepam (ATIVAN) 0.5 MG tablet TAKE 1 TABLET BY MOUTH  DAILY prn  2    MELATONIN PO Take 5 mg by mouth      metFORMIN (GLUCOPHAGE XR) 500 MG 24 hr tablet Take 2 tablets (1,000 mg) by mouth daily (with dinner) 180 tablet 3    montelukast (SINGULAIR) 10 MG tablet Take 1 tablet (10 mg) by mouth at bedtime 90 tablet 3    simvastatin (ZOCOR) 40 MG tablet Take 1 tablet (40 mg) by mouth at  bedtime 90 tablet 3    vitamin D3 (CHOLECALCIFEROL) 2000 units (50 mcg) tablet Take 1 tablet (2,000 Units) by mouth daily      zolpidem (AMBIEN) 10 MG tablet 1 TABLET AT BEDTIME 30 tablet 0    simvastatin (ZOCOR) 40 MG tablet TAKE 1 TABLET BY MOUTH AT BEDTIME 90 tablet 1     Allergies   Allergen Reactions    Ivp Dye [Contrast Dye] Shortness Of Breath    Claritin [Loratadine]      Hallucinations       Current providers sharing in care for this patient include:  Patient Care Team:  Chata Wallace MD as PCP - General (Family Practice)  Chata Wallace MD as Assigned PCP  Vickie Fry RD as Diabetes Educator (Dietitian, Registered)  Ronit Weiss MD as Assigned Heart and Vascular Provider  Stone Jimenez MD as MD (Neurological Surgery)  Stone Jimenez MD as Assigned Neuroscience Provider    The following health maintenance items are reviewed in Epic and correct as of today:  Health Maintenance   Topic Date Due    ASTHMA ACTION PLAN  08/30/2018    DTAP/TDAP/TD IMMUNIZATION (2 - Td or Tdap) 10/04/2021    DIABETIC FOOT EXAM  03/31/2022    MEDICARE ANNUAL WELLNESS VISIT  07/05/2024    INFLUENZA VACCINE (1) 09/01/2024    ASTHMA CONTROL TEST  09/06/2024    A1C  01/15/2025    PHQ-9  01/15/2025    BMP  07/15/2025    LIPID  07/15/2025    MICROALBUMIN  07/15/2025    ANNUAL REVIEW OF HM ORDERS  07/15/2025    FALL RISK ASSESSMENT  07/15/2025    DEXA  10/12/2025    EYE EXAM  12/04/2025    ADVANCE CARE PLANNING  07/15/2029    HEPATITIS C SCREENING  Completed    DEPRESSION ACTION PLAN  Completed    Pneumococcal Vaccine: 65+ Years  Completed    ZOSTER IMMUNIZATION  Completed    RSV VACCINE (Pregnancy & 60+)  Completed    COVID-19 Vaccine  Completed    IPV IMMUNIZATION  Aged Out    HPV IMMUNIZATION  Aged Out    MENINGITIS IMMUNIZATION  Aged Out    RSV MONOCLONAL ANTIBODY  Aged Out    MAMMO SCREENING  Discontinued    COLORECTAL CANCER SCREENING  Discontinued         Review of Systems  CONSTITUTIONAL: NEGATIVE for  "fever, chills, change in weight  INTEGUMENTARY/SKIN: NEGATIVE for worrisome rashes, moles or lesions  EYES: NEGATIVE for vision changes or irritation  ENT/MOUTH: NEGATIVE for ear, mouth and throat problems  RESP: NEGATIVE for significant cough or SOB  BREAST: NEGATIVE for masses, tenderness or discharge  CV: NEGATIVE for chest pain, palpitations or peripheral edema  GI: NEGATIVE for nausea, abdominal pain, heartburn, or change in bowel habits  : NEGATIVE for frequency, dysuria, or hematuria  MUSCULOSKELETAL: NEGATIVE for significant arthralgias or myalgia  NEURO: NEGATIVE for weakness, dizziness or paresthesias  ENDOCRINE: NEGATIVE for temperature intolerance, skin/hair changes  HEME: NEGATIVE for bleeding problems  PSYCHIATRIC: NEGATIVE for changes in mood or affect     Objective    Exam  /68   Pulse 73   Temp 97.5  F (36.4  C) (Temporal)   Resp 20   Ht 1.57 m (5' 1.81\")   Wt 71.4 kg (157 lb 4.8 oz)   SpO2 99%   BMI 28.95 kg/m     Estimated body mass index is 28.95 kg/m  as calculated from the following:    Height as of this encounter: 1.57 m (5' 1.81\").    Weight as of this encounter: 71.4 kg (157 lb 4.8 oz).    Physical Exam  GENERAL: alert and no distress  EYES: Eyes grossly normal to inspection, PERRL and conjunctivae and sclerae normal  HENT: ear canals and TM's normal, nose and mouth without ulcers or lesions  NECK: no adenopathy, no asymmetry, masses, or scars  RESP: lungs clear to auscultation - no rales, rhonchi or wheezes  CV: regular rate and rhythm, normal S1 S2, no S3 or S4, no murmur, click or rub, no peripheral edema  ABDOMEN: soft, nontender, no hepatosplenomegaly, no masses and bowel sounds normal  MS: no gross musculoskeletal defects noted, no edema  SKIN: no suspicious lesions or rashes  NEURO: Normal strength and tone, mentation intact and speech normal  PSYCH: mentation appears normal, affect normal/bright        7/15/2024   Mini Cog   Clock Draw Score 2 Normal   3 Item Recall 3 " objects recalled   Mini Cog Total Score 5             Answers submitted by the patient for this visit:  Patient Health Questionnaire (Submitted on 7/15/2024)  If you checked off any problems, how difficult have these problems made it for you to do your work, take care of things at home, or get along with other people?: Not difficult at all  PHQ9 TOTAL SCORE: 1      Signed Electronically by: Chata Wallace MD

## 2024-07-16 LAB
ALBUMIN SERPL BCG-MCNC: 4.2 G/DL (ref 3.5–5.2)
ALP SERPL-CCNC: 80 U/L (ref 40–150)
ALT SERPL W P-5'-P-CCNC: 15 U/L (ref 0–50)
ANION GAP SERPL CALCULATED.3IONS-SCNC: 10 MMOL/L (ref 7–15)
AST SERPL W P-5'-P-CCNC: 22 U/L (ref 0–45)
BILIRUB SERPL-MCNC: 0.7 MG/DL
BUN SERPL-MCNC: 22.8 MG/DL (ref 8–23)
CALCIUM SERPL-MCNC: 9.4 MG/DL (ref 8.8–10.4)
CHLORIDE SERPL-SCNC: 108 MMOL/L (ref 98–107)
CHOLEST SERPL-MCNC: 151 MG/DL
CREAT SERPL-MCNC: 0.85 MG/DL (ref 0.51–0.95)
CREAT UR-MCNC: 87.7 MG/DL
EGFRCR SERPLBLD CKD-EPI 2021: 70 ML/MIN/1.73M2
FASTING STATUS PATIENT QL REPORTED: ABNORMAL
FASTING STATUS PATIENT QL REPORTED: ABNORMAL
GLUCOSE SERPL-MCNC: 113 MG/DL (ref 70–99)
HCO3 SERPL-SCNC: 24 MMOL/L (ref 22–29)
HDLC SERPL-MCNC: 41 MG/DL
LDLC SERPL CALC-MCNC: 81 MG/DL
MICROALBUMIN UR-MCNC: <12 MG/L
MICROALBUMIN/CREAT UR: NORMAL MG/G{CREAT}
NONHDLC SERPL-MCNC: 110 MG/DL
POTASSIUM SERPL-SCNC: 4.1 MMOL/L (ref 3.4–5.3)
PROT SERPL-MCNC: 7 G/DL (ref 6.4–8.3)
SODIUM SERPL-SCNC: 142 MMOL/L (ref 135–145)
TRIGL SERPL-MCNC: 144 MG/DL

## 2024-08-15 ENCOUNTER — VIRTUAL VISIT (OUTPATIENT)
Dept: FAMILY MEDICINE | Facility: CLINIC | Age: 79
End: 2024-08-15
Payer: MEDICARE

## 2024-08-15 DIAGNOSIS — K13.79 MOUTH SORE: ICD-10-CM

## 2024-08-15 DIAGNOSIS — K14.9 TONGUE IRRITATION: Primary | ICD-10-CM

## 2024-08-15 PROCEDURE — 99441 PR PHYSICIAN TELEPHONE EVALUATION 5-10 MIN: CPT | Mod: 93 | Performed by: FAMILY MEDICINE

## 2024-08-15 RX ORDER — TRIAMCINOLONE ACETONIDE 0.1 %
PASTE (GRAM) DENTAL 2 TIMES DAILY
Qty: 5 G | Refills: 0 | Status: SHIPPED | OUTPATIENT
Start: 2024-08-15

## 2024-08-15 ASSESSMENT — ASTHMA QUESTIONNAIRES: ACT_TOTALSCORE: 22

## 2024-08-15 NOTE — PROGRESS NOTES
Claribel is a 79 year old who is being evaluated via a billable telephone visit.    What phone number would you like to be contacted at? [phone]  How would you like to obtain your AVS? Mail a copy  Originating Location (pt. Location): Home    Distant Location (provider location):  On-site    Assessment & Plan     Tongue irritation  Patient has some irritation on the tongue and some mouth sores suggested to use Kenalog paste to help with some inflammation .  Push for the next 1 to 2 days and also use over-the-counter Orajel for symptomatic relief.  If symptoms does not improve please follow-up in the clinic in the next 3 to 4 days.  - triamcinolone (KENALOG) 0.1 % paste; Take by mouth 2 times daily On togue    Mouth sore    - triamcinolone (KENALOG) 0.1 % paste; Take by mouth 2 times daily On togue                Subjective   Claribel is a 79 year old, presenting for the following health issues:  Mouth Lesions        8/15/2024    12:35 PM   Additional Questions   Roomed by Rich SLADE     Mouth Lesion  Onset/Duration: 2 weeks   Description  Location: Tongue  Color: white and red  Intensity:  moderate  Progression of Symptoms:  worsening  Therapies tried and outcome: none        Objective           Vitals:  No vitals were obtained today due to virtual visit.    Physical Exam   General: Alert and no distress //Respiratory: No audible wheeze, cough, or shortness of breath // Psychiatric:  Appropriate affect, tone, and pace of words            Phone call duration: 7 minutes  Signed Electronically by: Cortez Mueller MD

## 2024-08-26 ENCOUNTER — TELEPHONE (OUTPATIENT)
Dept: FAMILY MEDICINE | Facility: CLINIC | Age: 79
End: 2024-08-26
Payer: MEDICARE

## 2024-08-26 NOTE — TELEPHONE ENCOUNTER
Ok to book on 8/28 of next day or same day    Chata Wallace MD  [facility] [place]

## 2024-08-26 NOTE — TELEPHONE ENCOUNTER
S-(situation): Patient had VV with provider on 08/15/24 related to tongue irritation. States provider gave triamcinolone paste and it has not worked.     Patient wants to be seen in person by provider. Please advise appropriate appt time. Ok to use Same Day/Next Day appt?

## 2024-08-28 ENCOUNTER — TELEPHONE (OUTPATIENT)
Dept: FAMILY MEDICINE | Facility: CLINIC | Age: 79
End: 2024-08-28

## 2024-08-28 ENCOUNTER — OFFICE VISIT (OUTPATIENT)
Dept: FAMILY MEDICINE | Facility: CLINIC | Age: 79
End: 2024-08-28
Payer: MEDICARE

## 2024-08-28 VITALS
HEIGHT: 62 IN | WEIGHT: 160 LBS | OXYGEN SATURATION: 98 % | TEMPERATURE: 97.7 F | SYSTOLIC BLOOD PRESSURE: 96 MMHG | DIASTOLIC BLOOD PRESSURE: 61 MMHG | RESPIRATION RATE: 20 BRPM | BODY MASS INDEX: 29.44 KG/M2 | HEART RATE: 83 BPM

## 2024-08-28 DIAGNOSIS — K13.70 ORAL LESION: Primary | ICD-10-CM

## 2024-08-28 PROCEDURE — 99214 OFFICE O/P EST MOD 30 MIN: CPT | Performed by: FAMILY MEDICINE

## 2024-08-28 ASSESSMENT — PAIN SCALES - GENERAL: PAINLEVEL: MILD PAIN (3)

## 2024-08-28 NOTE — TELEPHONE ENCOUNTER
MARIA LUISA Wallace,  Pt wanted to let you know the soonest she could get scheduled for her Oral surgery visit is Oct 1st but at different clinic.   [facility]     Phone [phone]  Fax  [phone]

## 2024-08-28 NOTE — PROGRESS NOTES
"  Assessment & Plan     Oral lesion    - Oral Surgery Referral; Future      Patient has a lump on her tongue for which I recommended her to see oral surgery.  Referral given to the patient.  It is not an ulcer and has grown bigger in size therefore I am concerned about it.  Patient verbalized understanding          Subjective   Claribel is a 79 year old, presenting for the following health issues:  Tongue Lesion(S) (X3 weeks- Sore on the tongue, no fever or other syptoms associacted. )      8/28/2024    12:53 PM   Additional Questions   Roomed by Ginette LONDON     History of Present Illness       Reason for visit:  Sore in mouth  Symptom onset:  3-4 weeks ago  Symptoms include:  Soreness  Symptom intensity:  Moderate  Symptom progression:  Staying the same  Had these symptoms before:  No  What makes it worse:  Eating  What makes it better:  No   She is taking medications regularly.         Acute Illness  Acute illness concerns: Sore on tongue for the last 3 weeks   Onset/Duration: 3 weeks   Symptoms:  Fever: No  Chills/Sweats: No  Headache (location?): No  Sinus Pressure: No  Conjunctivitis:  No  Ear Pain: no  Rhinorrhea: No  Congestion: No  Sore Throat: No  Cough: no  Wheeze: No  Decreased Appetite: No  Nausea: No  Vomiting: No  Diarrhea: No  Dysuria/Freq.: No  Dysuria or Hematuria: No  Fatigue/Achiness: No  Sick/Strep Exposure: No  Therapies tried and outcome: None            Objective    BP 96/61   Pulse 83   Temp 97.7  F (36.5  C) (Tympanic)   Resp 20   Ht 1.576 m (5' 2.05\")   Wt 72.6 kg (160 lb)   SpO2 98%   BMI 29.22 kg/m    Body mass index is 29.22 kg/m .  Physical Exam   GENERAL: alert and no distress  Neck: No lymphadenopathy  Mouth: Lesion noted on the tongue close to the tip.  RESP: lungs clear to auscultation -  CV: regular rate and rhythm            Signed Electronically by: Chata Wallace MD    "

## 2024-09-06 DIAGNOSIS — E11.9 TYPE 2 DIABETES MELLITUS WITHOUT COMPLICATION, WITHOUT LONG-TERM CURRENT USE OF INSULIN (H): ICD-10-CM

## 2024-09-06 RX ORDER — METFORMIN HCL 500 MG
1000 TABLET, EXTENDED RELEASE 24 HR ORAL
Qty: 180 TABLET | Refills: 3 | OUTPATIENT
Start: 2024-09-06

## 2024-10-01 ENCOUNTER — TRANSFERRED RECORDS (OUTPATIENT)
Dept: HEALTH INFORMATION MANAGEMENT | Facility: CLINIC | Age: 79
End: 2024-10-01
Payer: MEDICARE

## 2024-10-09 ENCOUNTER — TRANSFERRED RECORDS (OUTPATIENT)
Dept: HEALTH INFORMATION MANAGEMENT | Facility: CLINIC | Age: 79
End: 2024-10-09
Payer: MEDICARE

## 2024-11-29 ENCOUNTER — TRANSFERRED RECORDS (OUTPATIENT)
Dept: HEALTH INFORMATION MANAGEMENT | Facility: CLINIC | Age: 79
End: 2024-11-29
Payer: MEDICARE

## 2024-12-27 DIAGNOSIS — E11.9 TYPE 2 DIABETES MELLITUS WITHOUT COMPLICATION, WITHOUT LONG-TERM CURRENT USE OF INSULIN (H): ICD-10-CM

## 2024-12-27 DIAGNOSIS — E78.5 HYPERLIPIDEMIA LDL GOAL <100: ICD-10-CM

## 2024-12-27 DIAGNOSIS — J45.20 MILD INTERMITTENT ASTHMA WITHOUT COMPLICATION: ICD-10-CM

## 2024-12-27 DIAGNOSIS — M81.0 OSTEOPOROSIS WITHOUT CURRENT PATHOLOGICAL FRACTURE, UNSPECIFIED OSTEOPOROSIS TYPE: ICD-10-CM

## 2024-12-27 DIAGNOSIS — F32.9 MAJOR DEPRESSION: ICD-10-CM

## 2024-12-27 RX ORDER — FLUTICASONE PROPIONATE AND SALMETEROL 250; 50 UG/1; UG/1
1 POWDER RESPIRATORY (INHALATION) EVERY 12 HOURS
Qty: 60 EACH | Refills: 5 | Status: SHIPPED | OUTPATIENT
Start: 2024-12-27

## 2024-12-27 RX ORDER — METFORMIN HYDROCHLORIDE 500 MG/1
1000 TABLET, EXTENDED RELEASE ORAL
Qty: 180 TABLET | Refills: 1 | Status: SHIPPED | OUTPATIENT
Start: 2024-12-27

## 2024-12-27 RX ORDER — ESCITALOPRAM OXALATE 20 MG/1
20 TABLET ORAL DAILY
Qty: 30 TABLET | Refills: 1 | Status: CANCELLED | OUTPATIENT
Start: 2024-12-27

## 2024-12-27 RX ORDER — ALENDRONATE SODIUM 70 MG/1
70 TABLET ORAL
Qty: 12 TABLET | Refills: 1 | Status: SHIPPED | OUTPATIENT
Start: 2024-12-27

## 2024-12-27 RX ORDER — SIMVASTATIN 40 MG
40 TABLET ORAL AT BEDTIME
Qty: 90 TABLET | Refills: 1 | Status: SHIPPED | OUTPATIENT
Start: 2024-12-27

## 2024-12-27 RX ORDER — MONTELUKAST SODIUM 10 MG/1
1 TABLET ORAL AT BEDTIME
Qty: 90 TABLET | Refills: 1 | Status: SHIPPED | OUTPATIENT
Start: 2024-12-27

## 2024-12-27 NOTE — TELEPHONE ENCOUNTER
Clinic RN: Please investigate patient's chart or contact patient if the information cannot be found because patient should have run out of this medication on 08/2015. Confirm patient is taking this medication as prescribed. Document findings and route refill encounter to provider for approval or denial.

## 2024-12-27 NOTE — TELEPHONE ENCOUNTER
Patient is changing pharmacy.       Maricarmen Ely RN  [facility]

## 2024-12-31 NOTE — TELEPHONE ENCOUNTER
Spoke to pt, this is prescribed by a specialist and she already requested them to send to her new pharmacy.    Leonor Dawn RN

## 2025-01-15 ENCOUNTER — OFFICE VISIT (OUTPATIENT)
Dept: FAMILY MEDICINE | Facility: CLINIC | Age: 80
End: 2025-01-15
Payer: MEDICARE

## 2025-01-15 VITALS
HEART RATE: 72 BPM | SYSTOLIC BLOOD PRESSURE: 112 MMHG | TEMPERATURE: 97.3 F | RESPIRATION RATE: 12 BRPM | BODY MASS INDEX: 28.71 KG/M2 | DIASTOLIC BLOOD PRESSURE: 60 MMHG | WEIGHT: 156 LBS | HEIGHT: 62 IN | OXYGEN SATURATION: 99 %

## 2025-01-15 DIAGNOSIS — G25.2 RESTING TREMOR: ICD-10-CM

## 2025-01-15 DIAGNOSIS — E11.9 TYPE 2 DIABETES MELLITUS WITHOUT COMPLICATION, WITHOUT LONG-TERM CURRENT USE OF INSULIN (H): Primary | ICD-10-CM

## 2025-01-15 DIAGNOSIS — R68.2 ORAL DRYNESS: ICD-10-CM

## 2025-01-15 DIAGNOSIS — R22.32 LOCALIZED SWELLING ON LEFT HAND: ICD-10-CM

## 2025-01-15 DIAGNOSIS — B37.0 THRUSH: ICD-10-CM

## 2025-01-15 LAB
CRP SERPL-MCNC: <3 MG/L
ERYTHROCYTE [SEDIMENTATION RATE] IN BLOOD BY WESTERGREN METHOD: 10 MM/HR (ref 0–30)
EST. AVERAGE GLUCOSE BLD GHB EST-MCNC: 137 MG/DL
HBA1C MFR BLD: 6.4 % (ref 0–5.6)

## 2025-01-15 PROCEDURE — G2211 COMPLEX E/M VISIT ADD ON: HCPCS | Performed by: FAMILY MEDICINE

## 2025-01-15 PROCEDURE — 36415 COLL VENOUS BLD VENIPUNCTURE: CPT | Performed by: FAMILY MEDICINE

## 2025-01-15 PROCEDURE — 86039 ANTINUCLEAR ANTIBODIES (ANA): CPT | Performed by: FAMILY MEDICINE

## 2025-01-15 PROCEDURE — 86038 ANTINUCLEAR ANTIBODIES: CPT | Performed by: FAMILY MEDICINE

## 2025-01-15 PROCEDURE — 83036 HEMOGLOBIN GLYCOSYLATED A1C: CPT | Performed by: FAMILY MEDICINE

## 2025-01-15 PROCEDURE — 86235 NUCLEAR ANTIGEN ANTIBODY: CPT | Performed by: FAMILY MEDICINE

## 2025-01-15 PROCEDURE — 99214 OFFICE O/P EST MOD 30 MIN: CPT | Performed by: FAMILY MEDICINE

## 2025-01-15 PROCEDURE — 86140 C-REACTIVE PROTEIN: CPT | Performed by: FAMILY MEDICINE

## 2025-01-15 PROCEDURE — 85652 RBC SED RATE AUTOMATED: CPT | Performed by: FAMILY MEDICINE

## 2025-01-15 RX ORDER — CLOTRIMAZOLE 10 MG/1
10 LOZENGE ORAL 4 TIMES DAILY
Qty: 40 LOZENGE | Refills: 0 | Status: SHIPPED | OUTPATIENT
Start: 2025-01-15 | End: 2025-01-25

## 2025-01-15 ASSESSMENT — ASTHMA QUESTIONNAIRES
QUESTION_5 LAST FOUR WEEKS HOW WOULD YOU RATE YOUR ASTHMA CONTROL: WELL CONTROLLED
QUESTION_1 LAST FOUR WEEKS HOW MUCH OF THE TIME DID YOUR ASTHMA KEEP YOU FROM GETTING AS MUCH DONE AT WORK, SCHOOL OR AT HOME: NONE OF THE TIME
QUESTION_4 LAST FOUR WEEKS HOW OFTEN HAVE YOU USED YOUR RESCUE INHALER OR NEBULIZER MEDICATION (SUCH AS ALBUTEROL): NOT AT ALL
QUESTION_2 LAST FOUR WEEKS HOW OFTEN HAVE YOU HAD SHORTNESS OF BREATH: NOT AT ALL
QUESTION_3 LAST FOUR WEEKS HOW OFTEN DID YOUR ASTHMA SYMPTOMS (WHEEZING, COUGHING, SHORTNESS OF BREATH, CHEST TIGHTNESS OR PAIN) WAKE YOU UP AT NIGHT OR EARLIER THAN USUAL IN THE MORNING: NOT AT ALL
ACT_TOTALSCORE: 24
ACT_TOTALSCORE: 24

## 2025-01-15 ASSESSMENT — PAIN SCALES - GENERAL: PAINLEVEL_OUTOF10: MILD PAIN (2)

## 2025-01-15 ASSESSMENT — PATIENT HEALTH QUESTIONNAIRE - PHQ9
10. IF YOU CHECKED OFF ANY PROBLEMS, HOW DIFFICULT HAVE THESE PROBLEMS MADE IT FOR YOU TO DO YOUR WORK, TAKE CARE OF THINGS AT HOME, OR GET ALONG WITH OTHER PEOPLE: NOT DIFFICULT AT ALL
SUM OF ALL RESPONSES TO PHQ QUESTIONS 1-9: 2
SUM OF ALL RESPONSES TO PHQ QUESTIONS 1-9: 2

## 2025-01-15 NOTE — PROGRESS NOTES
Assessment & Plan     Type 2 diabetes mellitus without complication, without long-term current use of insulin (H)  Previously well-managed.  Repeat labs ordered  - HEMOGLOBIN A1C; Future  - HEMOGLOBIN A1C    Oral dryness  Questionable etiology.  Recently had a tongue lesion biopsied which came back benign.  The lesion has healed well but she continues to notice excessive amount of dryness.  Labs ordered as below for further investigation  - Anti Nuclear Romi IgG by IFA with Reflex; Future  - CRP inflammation; Future  - Erythrocyte sedimentation rate auto; Future  - SSA Ro SONA Antibody IgG; Future  - SSB La SONA Antibody IgG; Future      Resting tremor     pill-rolling resting tremors noted today  incidentally.  I brought it to the patient's attention and she tells that she has had it for a while and never had it checked out.  They worsens if she is anxious.  Recommending to see neurology for evaluation  - Adult Neurology  Referral; Future    Thrush  Patient has burning discomfort in her mouth.  Recommending to empirically treat for thrush  - clotrimazole (MYCELEX) 10 MG lozenge; Place 1 lozenge (10 mg) inside cheek 4 times daily for 10 days.    Localized swelling on left hand  Questionable etiology.  Patient reports that she wakes up with mild swelling of her left hand and stiffness in that goes away in a few hours.  It is intermittent.  She  had a carpal tunnel surgery on the same wrist some years ago.  Recommending to use a carpal tunnel brace at night to see if that improves her symptoms or not.  In the next 2 to 3 weeks if no improvement noted with that, we should work her up for any circulation problems      The longitudinal plan of care for the diagnosis(es)/condition(s) as documented were addressed during this visit. Due to the added complexity in care, I will continue to support Claribel in the subsequent management and with ongoing continuity of care.      BMI  Estimated body mass index is 28.49  "kg/m  as calculated from the following:    Height as of this encounter: 1.576 m (5' 2.05\").    Weight as of this encounter: 70.8 kg (156 lb).             Breonna Sanford is a 79 year old, presenting for the following health issues:  Diabetes        1/15/2025     1:11 PM   Additional Questions   Roomed by Rich HAYES     History of Present Illness       Diabetes:   She presents for follow up of diabetes.  She is checking home blood glucose one time daily.   She checks blood glucose at bedtime.  Blood glucose is never over 200 and never under 70.  When her blood glucose is low, the patient is asymptomatic for confusion, blurred vision, lethargy and reports not feeling dizzy, shaky, or weak.   She has no concerns regarding her diabetes at this time.  She is having excessive thirst.            She eats 0-1 servings of fruits and vegetables daily.She consumes 0 sweetened beverage(s) daily.She exercises with enough effort to increase her heart rate 10 to 19 minutes per day.  She exercises with enough effort to increase her heart rate 3 or less days per week. She is missing 1 dose(s) of medications per week.         Concern - Mouth soreness   Onset: 2 months +   Description: dryness and soreness, lesion in the back of left side. Stinging and burning of tongue.     Patient reports that her left hand is swollen intermittently in the mornings when she wakes up.  It within an hour or 2 it gets better.  Also feels stiff.  Not red.  Her joints do not hurt.  She does have some underlying arthritis but it is not terrible.  This has been happening for the last several months. denies any discoloration      Review of Systems  CONSTITUTIONAL: NEGATIVE for fever, chills, change in weight  ENT/MOUTH: NEGATIVE for ear, mouth and throat problems  RESP: NEGATIVE for significant cough or SOB  CV: NEGATIVE for chest pain, palpitations or peripheral edema      Objective    /60   Pulse 72   Temp 97.3  F (36.3  C) (Tympanic)   Resp 12  " " Ht 1.576 m (5' 2.05\")   Wt 70.8 kg (156 lb)   SpO2 99%   BMI 28.49 kg/m    Body mass index is 28.49 kg/m .  Physical Exam   GENERAL: alert and no distress  NECK: no adenopathy, no asymmetry, masses, or scars  RESP: lungs clear to auscultation - no rales, rhonchi or wheezes  CV: regular rate and rhythm, normal S1 S2, no S3 or S4, no murmur, click or rub, no peripheral edema  ABDOMEN: soft, nontender, no hepatosplenomegaly, no masses and bowel sounds normal  MS: no gross musculoskeletal defects noted, no edema  Neurological: Pill-rolling resting activity noted off the right hand          Signed Electronically by: Chata Wallace MD    "

## 2025-01-16 LAB
ANA PAT SER IF-IMP: ABNORMAL
ANA SER QL IF: POSITIVE
ANA TITR SER IF: ABNORMAL {TITER}
ENA SS-A AB SER IA-ACNC: <0.5 U/ML
ENA SS-A AB SER IA-ACNC: NEGATIVE
ENA SS-B IGG SER IA-ACNC: 7.7 U/ML
ENA SS-B IGG SER IA-ACNC: ABNORMAL

## 2025-01-16 RX ORDER — CEVIMELINE HYDROCHLORIDE 30 MG/1
30 CAPSULE ORAL 3 TIMES DAILY
Qty: 90 CAPSULE | Refills: 2 | Status: SHIPPED | OUTPATIENT
Start: 2025-01-16

## 2025-01-23 ENCOUNTER — TRANSFERRED RECORDS (OUTPATIENT)
Dept: HEALTH INFORMATION MANAGEMENT | Facility: CLINIC | Age: 80
End: 2025-01-23
Payer: MEDICARE

## 2025-01-23 LAB — RETINOPATHY: NEGATIVE

## 2025-01-27 NOTE — TELEPHONE ENCOUNTER
NEUROLOGY PRE- VISIT      RECORDS RECEIVED FROM: ref'd by Chata Wallace MD   REASON FOR VISIT: Resting tremor, Parkinson's   PROVIDER: Cholo   DATE OF APPT: 01/30/2025   NOTES (FOR ALL VISITS) STATUS DETAILS   OFFICE NOTE from referring provider Internal Referral and notes in chart   OFFICE NOTE from other specialist Internal PT last completed 06/21/2024   DISCHARGE SUMMARY from hospital N/A    DISCHARGE REPORT from ER N/A    EEG N/A    EMG REPORT N/A         IMAGING  (FOR ALL VISITS)     MRI (HEAD, NECK, SPINE) Internal MR Lumbar 04/19/2024   CT (HEAD, NECK, SPINE) N/A    XR  (HEAD, NECK, SPINE) N/A

## 2025-01-29 ENCOUNTER — TELEPHONE (OUTPATIENT)
Dept: NEUROLOGY | Facility: CLINIC | Age: 80
End: 2025-01-29
Payer: MEDICARE

## 2025-01-29 NOTE — TELEPHONE ENCOUNTER
Attempted to reach patient to remind them about appointment scheduled with Shaji Emmanuel MD on 1/30/25 in our [place] clinic.    A voicemail was left with a call back number if the patient has questions or would like to reschedule.

## 2025-01-29 NOTE — PROGRESS NOTES
INITIAL NEUROLOGY CONSULTATION    DATE OF VISIT: 1/30/2025  CLINIC LOCATION: [facility]  MRN: [id number]  PATIENT NAME: Claribel Del Rio  YOB: 1945    REASON FOR VISIT: No chief complaint on file.    HISTORY OF PRESENT ILLNESS:                                                    Ms. Claribel Del Rio is 79 year old right handed female patient with past medical history of obesity, fibromyalgia, obstructive sleep apnea, asthma, diabetes mellitus type 2, hyperlipidemia, and depression, who was seen today for resting tremor.    Per patient's report, her primary care provider noted pill-rolling resting tremors in January 2025 and referred patient for further evaluation.  Patient stated that this tremor is present for several years and worsens when she is anxious.  She denies any additional focal neurological symptoms..    Laboratory evaluation from January 2025 includes normal CRP, elevated hemoglobin A1C of 6.4, positive KAMERON, equivocal SSB, and normal sed rate.    Brain MRI with and without contrast from 11/17/2024 demonstrated diffuse nonspecific T2 hyperintensities with wide differential without other abnormal findings.  Head MRA from the same date was negative.  Lumbar spine MRI from 4/19/2024 demonstrated multilevel degenerative changes, including moderate to severe central spinal canal stenosis and right greater than left lateral recess stenosis at L4-5.  Images were personally reviewed and independently interpreted.    No additional useful information is available in Care Everywhere, which was reviewed.  PAST MEDICAL/SURGICAL HISTORY:                                                    I personally reviewed patient's past medical and surgical history with the patient at today's visit.  MEDICATIONS:                                                    I personally reviewed patient's medications and allergies with the patient at today's visit.  ALLERGIES:                                                       Allergies   Allergen Reactions    Ivp Dye [Contrast Dye] Shortness Of Breath    Claritin [Loratadine]      Hallucinations       EXAM:                                                    VITAL SIGNS:   There were no vitals taken for this visit.  Mini-Cog Assessment:       General: pt is in NAD, cooperative.  Skin: normal turgor, moist mucous membranes, no lesions/rashes noticed.  HEENT: ATNC, EOMI, PERRL, white sclera, normal conjunctiva, no nystagmus or ptosis. No carotid bruits bilaterally.  Respiratory: lung sounds clear to auscultation bilaterally, no crackles, wheezes, rhonchi. Symmetric lung excursion, no accessory respiratory muscle use.  Cardiovascular: normal S1/S2, no murmurs/rubs/gallops.   Abdomen: Not distended.  : deferred.    Neurological:  Mental: alert, follows commands,  /5 with ***/3 on memory recall, no aphasia or dysarthria. Fund of knowledge is {MYAPPROPRIATE:[id number]}  Cranial Nerves:  CN II: visual acuity - able to accurately count fingers with each eye. Visual fields intact, fundi: discs sharp, no papilledema and normal vessels bilaterally.  CN III, IV, VI: EOM intact, pupils equal and reactive  CN V: facial sensation nl  CN VII: face symmetric, no facial droop  CN VIII: hearing normal  CN IX: palate elevation symmetric, uvula at midline  CN XI SCM normal, shoulder shrug nl  CN XII: tongue midline  Motor: Strength: 5/5 in all major groups of all extremities. Normal tone. No abnormal movements. No pronator drift b/l.  Reflexes: Triceps, biceps, brachioradialis, patellar, and achilles reflexes normal and symmetric. No clonus noted. Toes are down-going b/l.   Sensory: light touch, pinprick, and vibration intact. Romberg: negative.  Coordination: FNF and heel-shin tests intact b/l.   Gait:  Normal, able to tandem walk *** without difficulty.  DATA:   LABS/EEG/IMAGING/OTHER STUDIES: I reviewed pertinent medical records, as detailed in the history of present  illness.  ASSESSMENT AND PLAN:      ASSESSMENT: Claribel Del Rio is a 79 year old female patient with listed above past medical history, who presents with ***.    We had a detailed discussion with the patient regarding her presenting complaints.  The neurological exam today is ***.    DIAGNOSES:  No diagnosis found.  PLAN: There are no Patient Instructions on file for this visit.    Total Time: *** minutes spent on the date of the encounter doing chart review, history and exam, documentation and further activities per the note.    Shaji Emmanuel MD  [facility]  (Chart documentation was completed in part with Dragon voice-recognition software. Even though reviewed, some grammatical, spelling, and word errors may remain.)

## 2025-01-30 ENCOUNTER — PRE VISIT (OUTPATIENT)
Dept: NEUROLOGY | Facility: CLINIC | Age: 80
End: 2025-01-30

## 2025-01-30 ENCOUNTER — OFFICE VISIT (OUTPATIENT)
Dept: NEUROLOGY | Facility: CLINIC | Age: 80
End: 2025-01-30
Attending: FAMILY MEDICINE
Payer: MEDICARE

## 2025-01-30 VITALS — DIASTOLIC BLOOD PRESSURE: 69 MMHG | SYSTOLIC BLOOD PRESSURE: 114 MMHG | OXYGEN SATURATION: 98 % | HEART RATE: 93 BPM

## 2025-01-30 DIAGNOSIS — R25.3 MUSCLE TWITCHING: Primary | ICD-10-CM

## 2025-01-30 NOTE — PATIENT INSTRUCTIONS
AFTER VISIT SUMMARY (AVS):    At today's visit we thoroughly discussed various diagnostic possibilities for your symptoms and the plan.    We decided to monitor your symptoms over time.  Please come back if they become worse or you develop additional neurological symptoms, as we discussed.    No new medications.    Next follow-up appointment is on as needed basis.    Please do not hesitate to call me with any questions or concerns.    Thanks.

## 2025-01-30 NOTE — LETTER
"1/30/2025      Claribel Del Rio  [address] [place] [address]      Dear Colleague,    Thank you for referring your patient, Claribel Del Rio, to the [facility]. Please see a copy of my visit note below.    INITIAL NEUROLOGY CONSULTATION    DATE OF VISIT: 1/30/2025  CLINIC LOCATION: [facility]  MRN: [id number]  PATIENT NAME: Claribel Del Rio  YOB: 1945    REASON FOR VISIT:   Chief Complaint   Patient presents with     Consult     Hand Movements     HISTORY OF PRESENT ILLNESS:                                                    Ms. Claribel Del Rio is 79 year old right handed female patient with past medical history of obesity, fibromyalgia, obstructive sleep apnea, asthma, diabetes mellitus type 2, hyperlipidemia, and depression, who was seen today for resting tremor.    Per patient's report/review of medical records, her primary care provider noted \"pill-rolling resting tremor\" of the right hand in January 2025 and referred her for further evaluation.      Today, the patient reports that she has intermittent right hand index and thumb movements in certain pattern that are voluntary and help her to cope with stress/anxiety.  She could stop it at any time and thinks that this is her \"nervous habit\".  It does not occur with right arm movement or in certain positions.  It is present for the last 30 years without progression or interval development of new focal neurological symptoms.  Specifically, she denies anosmia, bradykinesia, acting out in sleep, changes in her gait and balance.  She also denies any additional focal neurological symptoms.  Occasionally, she might feel woozy.    Laboratory evaluation from January 2025 includes normal CRP, elevated hemoglobin A1C of 6.4, positive KAMERON, equivocal SSB, and normal sed rate.    Brain MRI with and without contrast from 11/17/2004 demonstrated diffuse nonspecific T2 hyperintensities with wide " differential without other abnormal findings.  Head MRA from the same date was negative.  Lumbar spine MRI from 4/19/2024 demonstrated multilevel degenerative changes, including moderate to severe central spinal canal stenosis and right greater than left lateral recess stenosis at L4-5.  Images were personally reviewed and independently interpreted.    No additional useful information is available in Care Everywhere, which was reviewed.  PAST MEDICAL/SURGICAL HISTORY:                                                    I personally reviewed patient's past medical and surgical history with the patient at today's visit.  MEDICATIONS:                                                    I personally reviewed patient's medications and allergies with the patient at today's visit.  ALLERGIES:                                                      Allergies   Allergen Reactions     Ivp Dye [Contrast Dye] Shortness Of Breath     Claritin [Loratadine]      Hallucinations       EXAM:                                                    VITAL SIGNS:   /69 (BP Location: Left arm, Patient Position: Sitting, Cuff Size: Adult Regular)   Pulse 93   SpO2 98%   Mini-Cog Assessment:  Mini Cog Assessment  Clock Draw Score: 2 Normal  3 Item Recall: 3 objects recalled  Mini Cog Total Score: 5  Administered by: : María Elena DAVISON    General: pt is in NAD, cooperative.  Skin: normal turgor, moist mucous membranes, no lesions/rashes noticed.  HEENT: ATNC, EOMI, PERRL, white sclera, normal conjunctiva, no nystagmus or ptosis. No carotid bruits bilaterally.  Respiratory: lung sounds clear to auscultation bilaterally, no crackles, wheezes, rhonchi. Symmetric lung excursion, no accessory respiratory muscle use.  Cardiovascular: normal S1/S2, no murmurs/rubs/gallops.   Abdomen: Not distended.  : deferred.    Neurological:  Mental: alert, follows commands, Mini Cog Total Score: 5/5 with 3/3 on memory recall, no aphasia or dysarthria. Fund of knowledge  is appropriate for age.  Cranial Nerves:  CN II: visual acuity - able to accurately count fingers with each eye. Visual fields intact, fundi: discs sharp, no papilledema and normal vessels bilaterally.  CN III, IV, VI: EOM intact, pupils equal and reactive  CN V: facial sensation nl  CN VII: face symmetric, no facial droop  CN VIII: hearing normal  CN IX: palate elevation symmetric, uvula at midline  CN XI SCM normal, shoulder shrug nl  CN XII: tongue midline  Motor: Strength: 5/5 in all major groups of all extremities. Normal tone, even with provoking maneuvers. No resting, positional, or kinetic tremor.  While counting backwards out loud from 102 0, the patient was noted to have twisting movements of the index finger of the varied amplitude, frequency, and duration.  No thumb movements, but the patient was able to demonstrate her pattern that she performs when she feels anxious or nervous.  No pronator drift b/l.  Reflexes: Triceps, biceps, brachioradialis, patellar, and achilles reflexes normal and symmetric. No clonus noted. Toes are down-going b/l.   Sensory: light touch, pinprick, and vibration intact. Romberg: negative.  Coordination: FNF and heel-shin tests intact b/l.   Gait:  Normal, able to tandem walk without difficulty.  DATA:   LABS/EEG/IMAGING/OTHER STUDIES: I reviewed pertinent medical records, as detailed in the history of present illness.  ASSESSMENT AND PLAN:      ASSESSMENT: Claribel Del Rio is a 79 year old female patient with listed above past medical history, who presents with intermittent right index and thumb movements that occur at stressful times for the last 30 years.    We had a detailed discussion with the patient regarding her presenting complaints.  The neurological exam today is noticeable for intermittent twisting movements of the index finger of varied amplitude, frequency, and duration, which are not consistent with pill-rolling tremor, as suspected.  The patient reports that  these movements are voluntary and help her to cope with anxiety/stress.  I did not see any extrapyramidal findings to suspect parkinsonism or Parkinson's disease.  Given the fact that the current symptoms do not affect her everyday functioning, I would suggest observation at this time without any additional testing.    DIAGNOSES:    ICD-10-CM    1. Muscle twitching  R25.3 Adult Neurology  Referral        PLAN: At today's visit we thoroughly discussed various diagnostic possibilities for patient's symptoms and the plan.    We decided to monitor symptoms over time.  I advised the patient to come back if they become worse or she develops additional neurological symptoms, as we discussed.    No new medications.    Next follow-up appointment is on as needed basis.    Total Time: 56 minutes spent on the date of the encounter doing chart review, history and exam, documentation and further activities per the note.    Shaji Emmanuel MD  [facility]  (Chart documentation was completed in part with Dragon voice-recognition software. Even though reviewed, some grammatical, spelling, and word errors may remain.)            Again, thank you for allowing me to participate in the care of your patient.        Sincerely,        Shaji Emmanuel MD    Electronically signed

## 2025-01-30 NOTE — PROGRESS NOTES
"INITIAL NEUROLOGY CONSULTATION    DATE OF VISIT: 1/30/2025  CLINIC LOCATION: [facility]  MRN: [id number]  PATIENT NAME: Claribel Del Rio  YOB: 1945    REASON FOR VISIT:   Chief Complaint   Patient presents with    Consult     Hand Movements     HISTORY OF PRESENT ILLNESS:                                                    Ms. Claribel Del Rio is 79 year old right handed female patient with past medical history of obesity, fibromyalgia, obstructive sleep apnea, asthma, diabetes mellitus type 2, hyperlipidemia, and depression, who was seen today for resting tremor.    Per patient's report/review of medical records, her primary care provider noted \"pill-rolling resting tremor\" of the right hand in January 2025 and referred her for further evaluation.      Today, the patient reports that she has intermittent right hand index and thumb movements in certain pattern that are voluntary and help her to cope with stress/anxiety.  She could stop it at any time and thinks that this is her \"nervous habit\".  It does not occur with right arm movement or in certain positions.  It is present for the last 30 years without progression or interval development of new focal neurological symptoms.  Specifically, she denies anosmia, bradykinesia, acting out in sleep, changes in her gait and balance.  She also denies any additional focal neurological symptoms.  Occasionally, she might feel woozy.    Laboratory evaluation from January 2025 includes normal CRP, elevated hemoglobin A1C of 6.4, positive KAMERON, equivocal SSB, and normal sed rate.    Brain MRI with and without contrast from 11/17/2004 demonstrated diffuse nonspecific T2 hyperintensities with wide differential without other abnormal findings.  Head MRA from the same date was negative.  Lumbar spine MRI from 4/19/2024 demonstrated multilevel degenerative changes, including moderate to severe central spinal canal stenosis and right greater than " left lateral recess stenosis at L4-5.  Images were personally reviewed and independently interpreted.    No additional useful information is available in Care Everywhere, which was reviewed.  PAST MEDICAL/SURGICAL HISTORY:                                                    I personally reviewed patient's past medical and surgical history with the patient at today's visit.  MEDICATIONS:                                                    I personally reviewed patient's medications and allergies with the patient at today's visit.  ALLERGIES:                                                      Allergies   Allergen Reactions    Ivp Dye [Contrast Dye] Shortness Of Breath    Claritin [Loratadine]      Hallucinations       EXAM:                                                    VITAL SIGNS:   /69 (BP Location: Left arm, Patient Position: Sitting, Cuff Size: Adult Regular)   Pulse 93   SpO2 98%   Mini-Cog Assessment:  Mini Cog Assessment  Clock Draw Score: 2 Normal  3 Item Recall: 3 objects recalled  Mini Cog Total Score: 5  Administered by: : María Elena DAVISON    General: pt is in NAD, cooperative.  Skin: normal turgor, moist mucous membranes, no lesions/rashes noticed.  HEENT: ATNC, EOMI, PERRL, white sclera, normal conjunctiva, no nystagmus or ptosis. No carotid bruits bilaterally.  Respiratory: lung sounds clear to auscultation bilaterally, no crackles, wheezes, rhonchi. Symmetric lung excursion, no accessory respiratory muscle use.  Cardiovascular: normal S1/S2, no murmurs/rubs/gallops.   Abdomen: Not distended.  : deferred.    Neurological:  Mental: alert, follows commands, Mini Cog Total Score: 5/5 with 3/3 on memory recall, no aphasia or dysarthria. Fund of knowledge is appropriate for age.  Cranial Nerves:  CN II: visual acuity - able to accurately count fingers with each eye. Visual fields intact, fundi: discs sharp, no papilledema and normal vessels bilaterally.  CN III, IV, VI: EOM intact, pupils equal and  reactive  CN V: facial sensation nl  CN VII: face symmetric, no facial droop  CN VIII: hearing normal  CN IX: palate elevation symmetric, uvula at midline  CN XI SCM normal, shoulder shrug nl  CN XII: tongue midline  Motor: Strength: 5/5 in all major groups of all extremities. Normal tone, even with provoking maneuvers. No resting, positional, or kinetic tremor.  While counting backwards out loud from 102 0, the patient was noted to have twisting movements of the index finger of the varied amplitude, frequency, and duration.  No thumb movements, but the patient was able to demonstrate her pattern that she performs when she feels anxious or nervous.  No pronator drift b/l.  Reflexes: Triceps, biceps, brachioradialis, patellar, and achilles reflexes normal and symmetric. No clonus noted. Toes are down-going b/l.   Sensory: light touch, pinprick, and vibration intact. Romberg: negative.  Coordination: FNF and heel-shin tests intact b/l.   Gait:  Normal, able to tandem walk without difficulty.  DATA:   LABS/EEG/IMAGING/OTHER STUDIES: I reviewed pertinent medical records, as detailed in the history of present illness.  ASSESSMENT AND PLAN:      ASSESSMENT: Claribel Del Rio is a 79 year old female patient with listed above past medical history, who presents with intermittent right index and thumb movements that occur at stressful times for the last 30 years.    We had a detailed discussion with the patient regarding her presenting complaints.  The neurological exam today is noticeable for intermittent twisting movements of the index finger of varied amplitude, frequency, and duration, which are not consistent with pill-rolling tremor, as suspected.  The patient reports that these movements are voluntary and help her to cope with anxiety/stress.  I did not see any extrapyramidal findings to suspect parkinsonism or Parkinson's disease.  Given the fact that the current symptoms do not affect her everyday functioning, I  would suggest observation at this time without any additional testing.    DIAGNOSES:    ICD-10-CM    1. Muscle twitching  R25.3 Adult Neurology  Referral        PLAN: At today's visit we thoroughly discussed various diagnostic possibilities for patient's symptoms and the plan.    We decided to monitor symptoms over time.  I advised the patient to come back if they become worse or she develops additional neurological symptoms, as we discussed.    No new medications.    Next follow-up appointment is on as needed basis.    Total Time: 56 minutes spent on the date of the encounter doing chart review, history and exam, documentation and further activities per the note.    Shaji Emmanuel MD  [facility]  (Chart documentation was completed in part with Dragon voice-recognition software. Even though reviewed, some grammatical, spelling, and word errors may remain.)

## 2025-02-05 ENCOUNTER — TRANSFERRED RECORDS (OUTPATIENT)
Dept: HEALTH INFORMATION MANAGEMENT | Facility: CLINIC | Age: 80
End: 2025-02-05
Payer: MEDICARE

## 2025-03-09 DIAGNOSIS — E11.9 TYPE 2 DIABETES MELLITUS WITHOUT COMPLICATION, WITHOUT LONG-TERM CURRENT USE OF INSULIN (H): ICD-10-CM

## 2025-03-09 NOTE — TELEPHONE ENCOUNTER
Medication Question or Refill        What medication are you calling about (include dose and sig)?: Diabetic test strips    Preferred Pharmacy:   [facility] [place], [place] - [address] [address]  Phone: [phone] Fax: [phone]      Controlled Substance Agreement on file:   CSA -- Patient Level:    CSA: None found at the patient level.       Who prescribed the medication?: PCP    Do you need a refill? Yes    When did you use the medication last? 3/9/25    Patient offered an appointment? No    Do you have any questions or concerns?  Yes: Patient recently changed pharmacies and will be needing diabetic care information faxed over to the new pharmacy. New pharmacy is [facility] in [place].           Could we send this information to you in [facility] or would you prefer to receive a phone call?:   Patient would prefer a phone call   Okay to leave a detailed message?: Yes at Cell number on file:    Telephone Information:   Mobile [phone]

## 2025-03-12 ENCOUNTER — TRANSFERRED RECORDS (OUTPATIENT)
Dept: HEALTH INFORMATION MANAGEMENT | Facility: CLINIC | Age: 80
End: 2025-03-12
Payer: MEDICARE

## 2025-04-14 ENCOUNTER — TELEPHONE (OUTPATIENT)
Dept: FAMILY MEDICINE | Facility: CLINIC | Age: 80
End: 2025-04-14
Payer: MEDICARE

## 2025-04-14 NOTE — TELEPHONE ENCOUNTER
Reason for Call:  Appointment Request    Patient requesting this type of appt:  pt has had a virus for 5 weeks, still coughing up mucous    Requested provider: Chata Wallace    Reason patient unable to be scheduled: Not within requested timeframe    When does patient want to be seen/preferred time:  as soon as possible    Comments: have appt scheduled with a different provider on 4/22, pt would prefer to be seen with Dr Wallace sooner if possible    Could we send this information to you in [facility] or would you prefer to receive a phone call?:   Patient would prefer a phone call   Okay to leave a detailed message?: Yes at Cell number on file:    Telephone Information:   Mobile [phone]       Call taken on 4/14/2025 at 12:56 PM by Li Huerta

## 2025-04-15 NOTE — TELEPHONE ENCOUNTER
I have a virtual slot available tomorrow,/16/25.  Please use that to book her for an in person visit    Chata Wallace MD  [facility], [place]

## 2025-04-16 ENCOUNTER — ANCILLARY PROCEDURE (OUTPATIENT)
Dept: GENERAL RADIOLOGY | Facility: CLINIC | Age: 80
End: 2025-04-16
Attending: FAMILY MEDICINE
Payer: MEDICARE

## 2025-04-16 ENCOUNTER — OFFICE VISIT (OUTPATIENT)
Dept: FAMILY MEDICINE | Facility: CLINIC | Age: 80
End: 2025-04-16
Payer: MEDICARE

## 2025-04-16 VITALS
RESPIRATION RATE: 18 BRPM | HEIGHT: 62 IN | WEIGHT: 150 LBS | DIASTOLIC BLOOD PRESSURE: 56 MMHG | HEART RATE: 83 BPM | BODY MASS INDEX: 27.6 KG/M2 | OXYGEN SATURATION: 98 % | TEMPERATURE: 96.8 F | SYSTOLIC BLOOD PRESSURE: 92 MMHG

## 2025-04-16 DIAGNOSIS — R05.9 COUGH, UNSPECIFIED TYPE: Primary | ICD-10-CM

## 2025-04-16 DIAGNOSIS — R05.9 COUGH, UNSPECIFIED TYPE: ICD-10-CM

## 2025-04-16 DIAGNOSIS — J01.90 ACUTE SINUSITIS WITH SYMPTOMS > 10 DAYS: ICD-10-CM

## 2025-04-16 DIAGNOSIS — J45.20 MILD INTERMITTENT ASTHMA WITHOUT COMPLICATION: ICD-10-CM

## 2025-04-16 PROCEDURE — 3078F DIAST BP <80 MM HG: CPT | Performed by: FAMILY MEDICINE

## 2025-04-16 PROCEDURE — 99214 OFFICE O/P EST MOD 30 MIN: CPT | Performed by: FAMILY MEDICINE

## 2025-04-16 PROCEDURE — 71046 X-RAY EXAM CHEST 2 VIEWS: CPT | Mod: TC | Performed by: RADIOLOGY

## 2025-04-16 PROCEDURE — 3074F SYST BP LT 130 MM HG: CPT | Performed by: FAMILY MEDICINE

## 2025-04-16 PROCEDURE — 1126F AMNT PAIN NOTED NONE PRSNT: CPT | Performed by: FAMILY MEDICINE

## 2025-04-16 RX ORDER — AZITHROMYCIN 250 MG/1
TABLET, FILM COATED ORAL
Qty: 6 TABLET | Refills: 0 | Status: SHIPPED | OUTPATIENT
Start: 2025-04-16

## 2025-04-16 RX ORDER — ALBUTEROL SULFATE 90 UG/1
2 INHALANT RESPIRATORY (INHALATION) EVERY 6 HOURS PRN
Qty: 18 G | Refills: 0 | Status: SHIPPED | OUTPATIENT
Start: 2025-04-16

## 2025-04-16 ASSESSMENT — PAIN SCALES - GENERAL: PAINLEVEL_OUTOF10: NO PAIN (0)

## 2025-04-16 NOTE — PROGRESS NOTES
"  Assessment & Plan     Cough, unspecified type  Chest x-ray ordered and reviewed by self.  Official report pending.  I do not see any obvious areas of opacity.   I will follow-up on the report as it becomes available .  Recommending the patient to keep herself hydrated.  Use over-the-counter cough medication like cough dropsto suppress the cough  - XR Chest 2 Views; Future    Mild intermittent asthma without complication  Patient is using Advair and Singulair.  Continue with that.  Albuterol prescription ordered  - albuterol (PROAIR HFA/PROVENTIL HFA/VENTOLIN HFA) 108 (90 Base) MCG/ACT inhaler; Inhale 2 puffs into the lungs every 6 hours as needed for shortness of breath, wheezing or cough.    Acute sinusitis with symptoms > 10 days  Started patient on azithromycin  - azithromycin (ZITHROMAX) 250 MG tablet; Two tablets first day, then one tablet daily for four days.          BMI  Estimated body mass index is 27.53 kg/m  as calculated from the following:    Height as of this encounter: 1.572 m (5' 1.89\").    Weight as of this encounter: 68 kg (150 lb).             Breonna Sanford is a 79 year old, presenting for the following health issues:  Ear Problem (RIGHT SIDE PT STATE IT KEEP GETTING PLUGGED UP )        4/16/2025     1:37 PM   Additional Questions   Roomed by Sandip     History of Present Illness       Reason for visit:  Persistent cough  Symptom onset:  More than a month   She is taking medications regularly.          Acute Illness  Acute illness concerns: Cough  Onset/Duration: 03/03/25  Symptoms:  Fever: No  Chills/Sweats: YES  Headache (location?): YES  Sinus Pressure: YES  Conjunctivitis:  YES  Ear Pain: YES: right  Rhinorrhea: YES  Congestion: YES  Sore Throat: No  Cough: YES-with shortness of breath, waxing and waning over time  Wheeze: YES  Decreased Appetite: YES  Nausea: No  Vomiting: No  Diarrhea: No  Dysuria/Freq.: No  Dysuria or Hematuria: No  Fatigue/Achiness: YES  Sick/Strep Exposure: " "No  Therapies tried and outcome: ALLEGRA D,MUCINEX             Objective    BP 92/56 (BP Location: Right arm, Patient Position: Sitting, Cuff Size: Adult Regular)   Pulse 83   Temp 96.8  F (36  C) (Temporal)   Resp 18   Ht 1.572 m (5' 1.89\")   Wt 68 kg (150 lb)   LMP  (LMP Unknown)   SpO2 98%   Breastfeeding No   BMI 27.53 kg/m    Body mass index is 27.53 kg/m .  Physical Exam   GENERAL: alert and no distress  HENT: ear canals and TM's normal, nose without any discharge.  Right maxillary sinus is tender on palpation  NECK: no adenopathy, no asymmetry, masses, or scars  RESP: Occasional rhonchi.  I do not appreciate any wheezing  CV: regular rate and rhythm          Signed Electronically by: Chata Wlalace MD    "

## 2025-07-15 ENCOUNTER — OFFICE VISIT (OUTPATIENT)
Dept: FAMILY MEDICINE | Facility: CLINIC | Age: 80
End: 2025-07-15
Payer: MEDICARE

## 2025-07-15 VITALS
HEART RATE: 79 BPM | WEIGHT: 145.6 LBS | DIASTOLIC BLOOD PRESSURE: 62 MMHG | RESPIRATION RATE: 18 BRPM | TEMPERATURE: 97.6 F | SYSTOLIC BLOOD PRESSURE: 92 MMHG | HEIGHT: 62 IN | OXYGEN SATURATION: 97 % | BODY MASS INDEX: 26.79 KG/M2

## 2025-07-15 DIAGNOSIS — Z12.11 COLON CANCER SCREENING: ICD-10-CM

## 2025-07-15 DIAGNOSIS — Z00.00 ENCOUNTER FOR ANNUAL WELLNESS EXAM IN MEDICARE PATIENT: Primary | ICD-10-CM

## 2025-07-15 DIAGNOSIS — J31.0 CHRONIC RHINITIS: ICD-10-CM

## 2025-07-15 DIAGNOSIS — J45.20 MILD INTERMITTENT ASTHMA WITHOUT COMPLICATION: ICD-10-CM

## 2025-07-15 DIAGNOSIS — R63.4 WEIGHT LOSS, UNINTENTIONAL: ICD-10-CM

## 2025-07-15 DIAGNOSIS — E11.9 TYPE 2 DIABETES MELLITUS WITHOUT COMPLICATION, WITHOUT LONG-TERM CURRENT USE OF INSULIN (H): ICD-10-CM

## 2025-07-15 DIAGNOSIS — E78.5 HYPERLIPIDEMIA LDL GOAL <100: ICD-10-CM

## 2025-07-15 DIAGNOSIS — M81.0 OSTEOPOROSIS WITHOUT CURRENT PATHOLOGICAL FRACTURE, UNSPECIFIED OSTEOPOROSIS TYPE: ICD-10-CM

## 2025-07-15 DIAGNOSIS — R68.2 DRY MOUTH: ICD-10-CM

## 2025-07-15 LAB
ALBUMIN SERPL BCG-MCNC: 4 G/DL (ref 3.5–5.2)
ALP SERPL-CCNC: 68 U/L (ref 40–150)
ALT SERPL W P-5'-P-CCNC: 15 U/L (ref 0–50)
ANION GAP SERPL CALCULATED.3IONS-SCNC: 12 MMOL/L (ref 7–15)
AST SERPL W P-5'-P-CCNC: 25 U/L (ref 0–45)
BASOPHILS # BLD AUTO: 0 10E3/UL (ref 0–0.2)
BASOPHILS NFR BLD AUTO: 0 %
BILIRUB SERPL-MCNC: 0.7 MG/DL
BUN SERPL-MCNC: 19.9 MG/DL (ref 8–23)
CALCIUM SERPL-MCNC: 9.6 MG/DL (ref 8.8–10.4)
CHLORIDE SERPL-SCNC: 105 MMOL/L (ref 98–107)
CHOLEST SERPL-MCNC: 145 MG/DL
CREAT SERPL-MCNC: 0.76 MG/DL (ref 0.51–0.95)
CREAT UR-MCNC: 82.3 MG/DL
EGFRCR SERPLBLD CKD-EPI 2021: 80 ML/MIN/1.73M2
EOSINOPHIL # BLD AUTO: 0.1 10E3/UL (ref 0–0.7)
EOSINOPHIL NFR BLD AUTO: 2 %
ERYTHROCYTE [DISTWIDTH] IN BLOOD BY AUTOMATED COUNT: 13.4 % (ref 10–15)
EST. AVERAGE GLUCOSE BLD GHB EST-MCNC: 123 MG/DL
FASTING STATUS PATIENT QL REPORTED: YES
FASTING STATUS PATIENT QL REPORTED: YES
GLUCOSE SERPL-MCNC: 99 MG/DL (ref 70–99)
HBA1C MFR BLD: 5.9 % (ref 0–5.6)
HCO3 SERPL-SCNC: 23 MMOL/L (ref 22–29)
HCT VFR BLD AUTO: 38.5 % (ref 35–47)
HDLC SERPL-MCNC: 46 MG/DL
HGB BLD-MCNC: 12.9 G/DL (ref 11.7–15.7)
IMM GRANULOCYTES # BLD: 0 10E3/UL
IMM GRANULOCYTES NFR BLD: 0 %
LDLC SERPL CALC-MCNC: 63 MG/DL
LYMPHOCYTES # BLD AUTO: 2 10E3/UL (ref 0.8–5.3)
LYMPHOCYTES NFR BLD AUTO: 28 %
MCH RBC QN AUTO: 29.9 PG (ref 26.5–33)
MCHC RBC AUTO-ENTMCNC: 33.5 G/DL (ref 31.5–36.5)
MCV RBC AUTO: 89 FL (ref 78–100)
MICROALBUMIN UR-MCNC: <12 MG/L
MICROALBUMIN/CREAT UR: NORMAL MG/G{CREAT}
MONOCYTES # BLD AUTO: 0.4 10E3/UL (ref 0–1.3)
MONOCYTES NFR BLD AUTO: 5 %
NEUTROPHILS # BLD AUTO: 4.8 10E3/UL (ref 1.6–8.3)
NEUTROPHILS NFR BLD AUTO: 65 %
NONHDLC SERPL-MCNC: 99 MG/DL
PLATELET # BLD AUTO: 232 10E3/UL (ref 150–450)
POTASSIUM SERPL-SCNC: 3.4 MMOL/L (ref 3.4–5.3)
PROT SERPL-MCNC: 6.7 G/DL (ref 6.4–8.3)
RBC # BLD AUTO: 4.31 10E6/UL (ref 3.8–5.2)
SODIUM SERPL-SCNC: 140 MMOL/L (ref 135–145)
TRIGL SERPL-MCNC: 179 MG/DL
TSH SERPL DL<=0.005 MIU/L-ACNC: 2.42 UIU/ML (ref 0.3–4.2)
WBC # BLD AUTO: 7.4 10E3/UL (ref 4–11)

## 2025-07-15 PROCEDURE — 80053 COMPREHEN METABOLIC PANEL: CPT | Performed by: FAMILY MEDICINE

## 2025-07-15 PROCEDURE — 83036 HEMOGLOBIN GLYCOSYLATED A1C: CPT | Performed by: FAMILY MEDICINE

## 2025-07-15 PROCEDURE — 3074F SYST BP LT 130 MM HG: CPT | Performed by: FAMILY MEDICINE

## 2025-07-15 PROCEDURE — 99214 OFFICE O/P EST MOD 30 MIN: CPT | Mod: 25 | Performed by: FAMILY MEDICINE

## 2025-07-15 PROCEDURE — 90480 ADMN SARSCOV2 VAC 1/ONLY CMP: CPT | Performed by: FAMILY MEDICINE

## 2025-07-15 PROCEDURE — 84443 ASSAY THYROID STIM HORMONE: CPT | Performed by: FAMILY MEDICINE

## 2025-07-15 PROCEDURE — 36415 COLL VENOUS BLD VENIPUNCTURE: CPT | Performed by: FAMILY MEDICINE

## 2025-07-15 PROCEDURE — 82043 UR ALBUMIN QUANTITATIVE: CPT | Performed by: FAMILY MEDICINE

## 2025-07-15 PROCEDURE — 85025 COMPLETE CBC W/AUTO DIFF WBC: CPT | Performed by: FAMILY MEDICINE

## 2025-07-15 PROCEDURE — 82570 ASSAY OF URINE CREATININE: CPT | Performed by: FAMILY MEDICINE

## 2025-07-15 PROCEDURE — G2211 COMPLEX E/M VISIT ADD ON: HCPCS | Performed by: FAMILY MEDICINE

## 2025-07-15 PROCEDURE — 91320 SARSCV2 VAC 30MCG TRS-SUC IM: CPT | Performed by: FAMILY MEDICINE

## 2025-07-15 PROCEDURE — 3078F DIAST BP <80 MM HG: CPT | Performed by: FAMILY MEDICINE

## 2025-07-15 PROCEDURE — 80061 LIPID PANEL: CPT | Performed by: FAMILY MEDICINE

## 2025-07-15 PROCEDURE — G0439 PPPS, SUBSEQ VISIT: HCPCS | Performed by: FAMILY MEDICINE

## 2025-07-15 PROCEDURE — 1125F AMNT PAIN NOTED PAIN PRSNT: CPT | Performed by: FAMILY MEDICINE

## 2025-07-15 PROCEDURE — 3044F HG A1C LEVEL LT 7.0%: CPT | Performed by: FAMILY MEDICINE

## 2025-07-15 RX ORDER — LORAZEPAM 0.5 MG/1
TABLET ORAL
COMMUNITY
Start: 2025-03-11

## 2025-07-15 RX ORDER — SIMVASTATIN 40 MG
40 TABLET ORAL AT BEDTIME
Qty: 90 TABLET | Refills: 3 | Status: SHIPPED | OUTPATIENT
Start: 2025-07-15

## 2025-07-15 RX ORDER — PILOCARPINE HYDROCHLORIDE 5 MG/1
TABLET, FILM COATED ORAL
COMMUNITY
Start: 2025-02-05

## 2025-07-15 RX ORDER — ALBUTEROL SULFATE 90 UG/1
2 INHALANT RESPIRATORY (INHALATION) EVERY 6 HOURS PRN
Qty: 18 G | Refills: 1 | Status: SHIPPED | OUTPATIENT
Start: 2025-07-15

## 2025-07-15 RX ORDER — METFORMIN HYDROCHLORIDE 500 MG/1
1000 TABLET, EXTENDED RELEASE ORAL
Qty: 180 TABLET | Refills: 3 | Status: SHIPPED | OUTPATIENT
Start: 2025-07-15 | End: 2025-07-16

## 2025-07-15 RX ORDER — FLUTICASONE PROPIONATE 50 MCG
2 SPRAY, SUSPENSION (ML) NASAL AT BEDTIME
Qty: 16 G | Refills: 4 | Status: SHIPPED | OUTPATIENT
Start: 2025-07-15

## 2025-07-15 RX ORDER — CLOTRIMAZOLE 10 MG/1
LOZENGE ORAL
COMMUNITY
Start: 2025-03-10 | End: 2025-07-15

## 2025-07-15 RX ORDER — FLUTICASONE PROPIONATE AND SALMETEROL 250; 50 UG/1; UG/1
1 POWDER RESPIRATORY (INHALATION) EVERY 12 HOURS
Qty: 60 EACH | Refills: 5 | Status: SHIPPED | OUTPATIENT
Start: 2025-07-15

## 2025-07-15 RX ORDER — ALENDRONATE SODIUM 70 MG/1
70 TABLET ORAL
Qty: 12 TABLET | Refills: 3 | Status: SHIPPED | OUTPATIENT
Start: 2025-07-15

## 2025-07-15 RX ORDER — MONTELUKAST SODIUM 10 MG/1
1 TABLET ORAL AT BEDTIME
Qty: 90 TABLET | Refills: 3 | Status: SHIPPED | OUTPATIENT
Start: 2025-07-15

## 2025-07-15 RX ORDER — TETANUS TOXOID, REDUCED DIPHTHERIA TOXOID AND ACELLULAR PERTUSSIS VACCINE, ADSORBED 5; 2.5; 8; 8; 2.5 [IU]/.5ML; [IU]/.5ML; UG/.5ML; UG/.5ML; UG/.5ML
SUSPENSION INTRAMUSCULAR
COMMUNITY
Start: 2025-01-16 | End: 2025-07-15

## 2025-07-15 RX ORDER — AMOXICILLIN 875 MG/1
TABLET, COATED ORAL
COMMUNITY
Start: 2025-04-29

## 2025-07-15 RX ORDER — FLUOCINONIDE 0.5 MG/G
OINTMENT TOPICAL
COMMUNITY
Start: 2025-03-10

## 2025-07-15 SDOH — HEALTH STABILITY: PHYSICAL HEALTH: ON AVERAGE, HOW MANY DAYS PER WEEK DO YOU ENGAGE IN MODERATE TO STRENUOUS EXERCISE (LIKE A BRISK WALK)?: 3 DAYS

## 2025-07-15 ASSESSMENT — PATIENT HEALTH QUESTIONNAIRE - PHQ9
SUM OF ALL RESPONSES TO PHQ QUESTIONS 1-9: 3
10. IF YOU CHECKED OFF ANY PROBLEMS, HOW DIFFICULT HAVE THESE PROBLEMS MADE IT FOR YOU TO DO YOUR WORK, TAKE CARE OF THINGS AT HOME, OR GET ALONG WITH OTHER PEOPLE: SOMEWHAT DIFFICULT
SUM OF ALL RESPONSES TO PHQ QUESTIONS 1-9: 3

## 2025-07-15 ASSESSMENT — ASTHMA QUESTIONNAIRES
ACT_TOTALSCORE: 23
QUESTION_5 LAST FOUR WEEKS HOW WOULD YOU RATE YOUR ASTHMA CONTROL: WELL CONTROLLED
QUESTION_1 LAST FOUR WEEKS HOW MUCH OF THE TIME DID YOUR ASTHMA KEEP YOU FROM GETTING AS MUCH DONE AT WORK, SCHOOL OR AT HOME: NONE OF THE TIME
QUESTION_2 LAST FOUR WEEKS HOW OFTEN HAVE YOU HAD SHORTNESS OF BREATH: ONCE OR TWICE A WEEK
QUESTION_3 LAST FOUR WEEKS HOW OFTEN DID YOUR ASTHMA SYMPTOMS (WHEEZING, COUGHING, SHORTNESS OF BREATH, CHEST TIGHTNESS OR PAIN) WAKE YOU UP AT NIGHT OR EARLIER THAN USUAL IN THE MORNING: NOT AT ALL
QUESTION_4 LAST FOUR WEEKS HOW OFTEN HAVE YOU USED YOUR RESCUE INHALER OR NEBULIZER MEDICATION (SUCH AS ALBUTEROL): NOT AT ALL

## 2025-07-15 ASSESSMENT — SOCIAL DETERMINANTS OF HEALTH (SDOH): HOW OFTEN DO YOU GET TOGETHER WITH FRIENDS OR RELATIVES?: THREE TIMES A WEEK

## 2025-07-15 ASSESSMENT — PAIN SCALES - GENERAL: PAINLEVEL_OUTOF10: MILD PAIN (3)

## 2025-07-15 NOTE — PROGRESS NOTES
Preventive Care Visit  [facility] CHRISTELLE Wallace MD, Family Medicine  Jul 15, 2025      Assessment & Plan     Encounter for annual wellness exam in Medicare patient    - TSH with free T4 reflex; Future  - CBC with platelets and differential; Future  - TSH with free T4 reflex  - CBC with platelets and differential    Osteoporosis without current pathological fracture, unspecified osteoporosis type  Continue use of Fosamax.  Continue to take calcium vitamin D and weightbearing exercises  - alendronate (FOSAMAX) 70 MG tablet; Take 1 tablet (70 mg) by mouth every 7 days.    Mild intermittent asthma without complication  Symptoms well-controlled  - fluticasone-salmeterol (ADVAIR) 250-50 MCG/ACT inhaler; Inhale 1 puff into the lungs every 12 hours.  - montelukast (SINGULAIR) 10 MG tablet; Take 1 tablet (10 mg) by mouth at bedtime.  - albuterol (PROAIR HFA/PROVENTIL HFA/VENTOLIN HFA) 108 (90 Base) MCG/ACT inhaler; Inhale 2 puffs into the lungs every 6 hours as needed for shortness of breath, wheezing or cough.    Type 2 diabetes mellitus without complication, without long-term current use of insulin (H)  Well-managed.  - metFORMIN (GLUCOPHAGE XR) 500 MG 24 hr tablet; Take 2 tablets (1,000 mg) by mouth daily (with dinner).  - blood glucose (CONTOUR NEXT TEST) test strip; Use to test blood sugar 1 times daily or as directed.  - blood glucose (NO BRAND SPECIFIED) lancets standard; Use to test blood sugar 1 times daily or as directed.  - Albumin Random Urine Quantitative with Creat Ratio; Future  - Hemoglobin A1c; Future  - Albumin Random Urine Quantitative with Creat Ratio  - Hemoglobin A1c    Hyperlipidemia LDL goal <100  Well-managed previously.  Repeat labs ordered  - simvastatin (ZOCOR) 40 MG tablet; Take 1 tablet (40 mg) by mouth at bedtime.  - Lipid panel reflex to direct LDL Fasting; Future  - Comprehensive metabolic panel; Future  - Lipid panel reflex to direct LDL Fasting  - Comprehensive  "metabolic panel    Dry mouth  She is working with a rheumatologist regarding dry mouth management.    Colon cancer screening    - Fecal colorectal cancer screen (FIT); Future  - Fecal colorectal cancer screen (FIT)    Weight loss, unintentional  Patient has lost weight over the last few months.  She tells that she has increased her level of physical activity as well as she gets nauseous once in a while and therefore her appetite has been suppressed.  She is trying to make an effort of keeping her weight steady and eating enough calories.  I recommend her to continue watching her weight and try not to lose any further.  If any further decline noted, notify me back    Chronic rhinitis  Recommending to try Flonase for postnasal drainage and phlegm that she notices first thing in the morning when she wakes up.  Hopefully that will help  - fluticasone (FLONASE) 50 MCG/ACT nasal spray; Spray 2 sprays into both nostrils at bedtime.      The longitudinal plan of care for the diagnosis(es)/condition(s) as documented were addressed during this visit. Due to the added complexity in care, I will continue to support Claribel in the subsequent management and with ongoing continuity of care.      BMI  Estimated body mass index is 26.73 kg/m  as calculated from the following:    Height as of this encounter: 1.572 m (5' 1.89\").    Weight as of this encounter: 66 kg (145 lb 9.6 oz).     Counseling  Appropriate preventive services were addressed with this patient via screening, questionnaire, or discussion as appropriate for  nutrition and physical activity.    Checklist reviewing preventive services available has been discussed with the patient.      Breonna Sanford is a 80 year old, presenting for the following:  Physical (Is fasting )        7/15/2025     9:36 AM   Additional Questions   Roomed by Sandip          [facility]           Advance Care Planning    Patient states has Health Care Directive and will send to [facility].        7/15/2025   General Health   How would you rate your overall physical health? Good   Feel stress (tense, anxious, or unable to sleep) Rather much   (!) STRESS CONCERN      7/15/2025   Nutrition   Diet: Diabetic    Breakfast skipped       Multiple values from one day are sorted in reverse-chronological order         7/15/2025   Exercise   Days per week of moderate/strenous exercise 3 days         7/15/2025   Social Factors   Frequency of gathering with friends or relatives Three times a week   Worry food won't last until get money to buy more No   Food not last or not have enough money for food? No   Do you have housing? (Housing is defined as stable permanent housing and does not include staying outside in a car, in a tent, in an abandoned building, in an overnight shelter, or couch-surfing.) Yes   Are you worried about losing your housing? No   Lack of transportation? No   Unable to get utilities (heat,electricity)? No         7/15/2025   Fall Risk   Fallen 2 or more times in the past year? No   Trouble with walking or balance? No          7/15/2025   Activities of Daily Living- Home Safety   Needs help with the following daily activites None of the above   Safety concerns in the home No grab bars in the bathroom         7/15/2025   Dental   Dentist two times every year? Yes         7/15/2025   Hearing Screening   Hearing concerns? (!) TROUBLE UNDERSTANDING SOFT OR WHISPERED SPEECH.   Would you like a referral for hearing testing? No         7/15/2025   Driving Risk Screening   Patient/family members have concerns about driving No         7/15/2025   General Alertness/Fatigue Screening   Have you been more tired than usual lately? No         7/15/2025   Urinary Incontinence Screening   Bothered by leaking urine in past 6 months No       Today's PHQ-9 Score:       7/15/2025     9:32 AM   PHQ-9 SCORE   PHQ-9 Total Score MyChart 3 (Minimal depression)   PHQ-9 Total Score 3        Patient-reported          7/15/2025   Substance Use   Alcohol more than 3/day or more than 7/wk No   Do you have a current opioid prescription? No   How severe/bad is pain from 1 to 10? 2/10   Do you use any other substances recreationally? No     Social History     Tobacco Use    Smoking status: Former     Current packs/day: 0.00     Types: Cigarettes     Quit date: 1968     Years since quittin.1     Passive exposure: Past    Smokeless tobacco: Never    Tobacco comments:     Previous smoker, 1-1.5 packs per week   Vaping Use    Vaping status: Never Used   Substance Use Topics    Alcohol use: Yes     Alcohol/week: 4.0 - 6.0 standard drinks of alcohol     Types: 4 - 6 Standard drinks or equivalent per week     Comment: rare    Drug use: No              ASCVD Risk   The 10-year ASCVD risk score (Kenisha SPAIN, et al., 2019) is: 21.4%    Values used to calculate the score:      Age: 80 years      Sex: Female      Is Non- : No      Diabetic: Yes      Tobacco smoker: No      Systolic Blood Pressure: 92 mmHg      Is BP treated: No      HDL Cholesterol: 41 mg/dL      Total Cholesterol: 151 mg/dL            Reviewed and updated as needed this visit by Provider    Allergies                 Patient Active Problem List   Diagnosis    Obesity    Fibromyalgia    Allergic rhinitis    Low back pain    MIRZA (obstructive sleep apnea)- on CPAP    Mild intermittent asthma without complication    Status post total right knee replacement    Type 2 diabetes mellitus without complication (H)    Hyperlipidemia LDL goal <100    Mild single current episode of major depressive disorder    Osteopenia    Mild intermittent asthma, unspecified whether complicated     Past Surgical History:   Procedure Laterality Date    AMB ESOPHAGEAL MANOMETRY  10/2007    Normal    APPENDECTOMY  1992    ARTHROPLASTY KNEE Right 2016    Procedure: ARTHROPLASTY KNEE;  Surgeon: Jose Alberto Gomez MD;  Location: [facility] OR    CARPAL TUNNEL RELEASE  RT/LT  ,     right 2002, left 2002    COLONOSCOPY  2009    Normal; repeat in 10 years    COLONOSCOPY N/A 2015    Procedure: COLONOSCOPY;  Surgeon: Cathie Melendez MD;  Location: [facility]    EXCHANGE POLY COMPONENT ARTHROPLASTY KNEE Right 2017    Procedure: EXCHANGE POLY COMPONENT ARTHROPLASTY KNEE;  1.  Right knee exploration and limited scar tissue excision.   2.  Tibial polyethylene insert exchange (after removal of the original polyethylene component and following the posterior cruciate ligament resection, a deep dish polyethylene #3 of 9 mm thickness was placed)     ;  Surgeon: Jose Alberto Gomez MD;  Location: [facility] OR    HYSTERECTOMY TOTAL ABDOMINAL, BILATERAL SALPINGO-OOPHORECTOMY, COMBINED  1992    Fibroids, endometriosis, Dr Rosas    JOINT REPLACEMENT  2010    bilateral thumb    PUNC/ASPIR BREAST CYST  2004    TONSILLECTOMY  1950       Social History     Tobacco Use    Smoking status: Former     Current packs/day: 0.00     Types: Cigarettes     Quit date: 1968     Years since quittin.1     Passive exposure: Past    Smokeless tobacco: Never    Tobacco comments:     Previous smoker, 1-1.5 packs per week   Substance Use Topics    Alcohol use: Yes     Alcohol/week: 4.0 - 6.0 standard drinks of alcohol     Types: 4 - 6 Standard drinks or equivalent per week     Comment: rare     Family History   Problem Relation Age of Onset    Hypertension Father     Blood Disease Father         DVT's    Eye Disorder Father     Colon Cancer Father     Colon Cancer Paternal Uncle     Asthma Sister     C.A.D. Paternal Grandfather     Diabetes Maternal Aunt     Colon Cancer Maternal Uncle     Hyperlipidemia Paternal Grandmother     Hypertension Paternal Grandmother     Hyperlipidemia Other         Aunt    Hypertension Maternal Grandfather     Hypertension Other         Aunt    Breast Cancer No family hx of     Anesthesia Reaction No family hx of     Osteoporosis No family hx of      Thyroid Disease No family hx of          Current Outpatient Medications   Medication Sig Dispense Refill    albuterol (PROAIR HFA/PROVENTIL HFA/VENTOLIN HFA) 108 (90 Base) MCG/ACT inhaler Inhale 2 puffs into the lungs every 6 hours as needed for shortness of breath, wheezing or cough. 18 g 1    alendronate (FOSAMAX) 70 MG tablet Take 1 tablet (70 mg) by mouth every 7 days. 12 tablet 3    amoxicillin (AMOXIL) 875 MG tablet       blood glucose (CONTOUR NEXT TEST) test strip Use to test blood sugar 1 times daily or as directed. 100 strip 3    blood glucose (NO BRAND SPECIFIED) lancets standard Use to test blood sugar 1 times daily or as directed. 100 each 3    blood glucose monitoring (EMILY MICROLET) lancets Use to test blood sugar 2 times daily or as directed.  Ok to substitute alternative if insurance prefers. 100 each 3    calcium carbonate (OS-NETO) 1500 (600 Ca) MG tablet Take 1 tablet (600 mg) by mouth 2 times daily (with meals)      escitalopram (LEXAPRO) 20 MG tablet Take 1 tablet (20 mg) by mouth daily 30 tablet 1    fluocinonide (LIDEX) 0.05 % external ointment       fluticasone (FLONASE) 50 MCG/ACT nasal spray Spray 2 sprays into both nostrils at bedtime. 16 g 4    fluticasone-salmeterol (ADVAIR) 250-50 MCG/ACT inhaler Inhale 1 puff into the lungs every 12 hours. 60 each 5    LORazepam (ATIVAN) 0.5 MG tablet       MELATONIN PO Take 5 mg by mouth      metFORMIN (GLUCOPHAGE XR) 500 MG 24 hr tablet Take 2 tablets (1,000 mg) by mouth daily (with dinner). 180 tablet 3    montelukast (SINGULAIR) 10 MG tablet Take 1 tablet (10 mg) by mouth at bedtime. 90 tablet 3    pilocarpine (SALAGEN) 5 MG tablet       simvastatin (ZOCOR) 40 MG tablet Take 1 tablet (40 mg) by mouth at bedtime. 90 tablet 3    vitamin D3 (CHOLECALCIFEROL) 2000 units (50 mcg) tablet Take 1 tablet (2,000 Units) by mouth daily      zolpidem (AMBIEN) 10 MG tablet 1 TABLET AT BEDTIME 30 tablet 0     Allergies   Allergen Reactions    Ivp Dye [Contrast  Dye] Shortness Of Breath    Claritin [Loratadine]      Hallucinations       Current providers sharing in care for this patient include:  Patient Care Team:  Chata Wallace MD as PCP - General (Family Practice)  Chata Wallace MD as Assigned PCP  Vickie Fry RD as Diabetes Educator (Dietitian, Registered)  Stone Jimenez MD as MD (Neurological Surgery)  Shaji Emmanuel MD as MD (Neurology)  Shaji Emmanuel MD as Assigned Neuroscience Provider    The following health maintenance items are reviewed in Epic and correct as of today:  Health Maintenance   Topic Date Due    ASTHMA ACTION PLAN  08/30/2018    DIABETIC FOOT EXAM  03/31/2022    MEDICARE ANNUAL WELLNESS VISIT  07/15/2025    BMP  07/15/2025    LIPID  07/15/2025    MICROALBUMIN  07/15/2025    INFLUENZA VACCINE (1) 09/01/2025    DEXA  10/12/2025    A1C  01/15/2026    ASTHMA CONTROL TEST  01/15/2026    PHQ-9  01/15/2026    ANNUAL REVIEW OF HM ORDERS  07/15/2026    FALL RISK ASSESSMENT  07/15/2026    EYE EXAM  01/23/2027    ADVANCE CARE PLANNING  07/15/2030    DTAP/TDAP/TD VACCINE (3 - Td or Tdap) 01/16/2035    HEPATITIS C SCREENING  Completed    DEPRESSION ACTION PLAN  Completed    PNEUMOCOCCAL VACCINE 50+ YEARS  Completed    ZOSTER VACCINE  Completed    RSV VACCINE  Completed    COVID-19 VACCINE  Completed    HPV VACCINE  Aged Out    MENINGITIS VACCINE  Aged Out    MAMMO SCREENING  Discontinued    COLORECTAL CANCER SCREENING  Discontinued         Review of Systems  CONSTITUTIONAL: NEGATIVE for fever, chills, change in weight  INTEGUMENTARY/SKIN: NEGATIVE for worrisome rashes, moles or lesions  EYES: NEGATIVE for vision changes or irritation  ENT/MOUTH: NEGATIVE for ear, mouth and throat problems  RESP: NEGATIVE for significant cough or SOB  BREAST: NEGATIVE for masses, tenderness or discharge  CV: NEGATIVE for chest pain, palpitations or peripheral edema  GI: NEGATIVE for nausea, abdominal pain, heartburn, or change in  "bowel habits  : NEGATIVE for frequency, dysuria, or hematuria  MUSCULOSKELETAL: NEGATIVE for significant arthralgias or myalgia  NEURO: NEGATIVE for weakness, dizziness or paresthesias  ENDOCRINE: NEGATIVE for temperature intolerance, skin/hair changes  HEME: NEGATIVE for bleeding problems  PSYCHIATRIC: NEGATIVE for changes in mood or affect     Objective    Exam  BP 92/62 (BP Location: Right arm, Patient Position: Sitting, Cuff Size: Adult Large)   Pulse 79   Temp 97.6  F (36.4  C) (Temporal)   Resp 18   Ht 1.572 m (5' 1.89\")   Wt 66 kg (145 lb 9.6 oz)   LMP  (LMP Unknown)   SpO2 97%   BMI 26.73 kg/m     Estimated body mass index is 26.73 kg/m  as calculated from the following:    Height as of this encounter: 1.572 m (5' 1.89\").    Weight as of this encounter: 66 kg (145 lb 9.6 oz).    Physical Exam  GENERAL: alert and no distress  EYES: Eyes grossly normal to inspection, PERRL and conjunctivae and sclerae normal  HENT: ear canals and TM's normal, nose and mouth without ulcers or lesions  NECK: no adenopathy, no asymmetry, masses, or scars  RESP: lungs clear to auscultation - no rales, rhonchi or wheezes  CV: regular rate and rhythm, normal S1 S2, no S3 or S4, no murmur, click or rub, no peripheral edema  ABDOMEN: soft, nontender, no hepatosplenomegaly, no masses and bowel sounds normal  MS: no gross musculoskeletal defects noted, no edema  SKIN: no suspicious lesions or rashes  NEURO: Normal strength and tone, mentation intact and speech normal  PSYCH: mentation appears normal, affect normal/bright         7/15/2025   Mini Cog   Clock Draw Score 2 Normal   3 Item Recall 3 objects recalled   Mini Cog Total Score 5              Signed Electronically by: Chata Wallace MD    "

## 2025-07-23 ENCOUNTER — TRANSFERRED RECORDS (OUTPATIENT)
Dept: HEALTH INFORMATION MANAGEMENT | Facility: CLINIC | Age: 80
End: 2025-07-23
Payer: MEDICARE

## 2025-07-23 LAB — HEMOCCULT STL QL IA: NEGATIVE

## (undated) DEVICE — SYR 50ML LL W/O NDL 309653

## (undated) DEVICE — BLADE KNIFE SURG 10 371110

## (undated) DEVICE — SU VICRYL 2-0 CT-1 27" UND J259H

## (undated) DEVICE — BAG CLEAR TRASH 1.3M 39X33" P4040C

## (undated) DEVICE — LINEN HALF SHEET 5512

## (undated) DEVICE — SUCTION TIP YANKAUER W/O VENT K86

## (undated) DEVICE — TOURNIQUET CUFF 30" REPRO BLUE 60-7070-105

## (undated) DEVICE — ESU GROUND PAD ADULT W/CORD E7507

## (undated) DEVICE — DRAPE CONVERTORS U-DRAPE 60X72" 8476

## (undated) DEVICE — NDL SPINAL 18GA 3.5" 405184

## (undated) DEVICE — Device

## (undated) DEVICE — PACK LOWER EXTREMITY RIDGES

## (undated) DEVICE — DRAPE STOCKINETTE IMPERVIOUS 12" 1587

## (undated) DEVICE — BNDG ELASTIC 6" DBL LENGTH UNSTERILE 6611-16

## (undated) DEVICE — GLOVE PROTEXIS POWDER FREE 7.5 ORTHOPEDIC 2D73ET75

## (undated) DEVICE — IMM KNEE 20"

## (undated) DEVICE — HANDPIECE INTERPULSE W/HIGH FLOW TIP & SUCTION 0210918000

## (undated) DEVICE — GLOVE PROTEXIS W/NEU-THERA 8.0  2D73TE80

## (undated) DEVICE — PREP CHLORAPREP 26ML TINTED ORANGE  260815

## (undated) DEVICE — TUBE CULTURE AEROBIC/ANAEROBIC W/O SWABS A.C.T.I.1. 12401

## (undated) DEVICE — STPL SKIN 35W APPOSE 8886803712

## (undated) DEVICE — CAST PADDING 6" STERILE 9046S

## (undated) DEVICE — GLOVE PROTEXIS POWDER FREE SMT 8.0  2D72PT80X

## (undated) DEVICE — DRAPE IOBAN INCISE 23X17" 6650EZ

## (undated) DEVICE — CAST PADDING 6" UNSTERILE 9046

## (undated) DEVICE — KIT CULTURE ESWAB AEROBE/ANAEROBE WHITE TOP R723480

## (undated) DEVICE — DRSG ABDOMINAL 07 1/2X8" 7197D

## (undated) DEVICE — APPLICATOR COTTON TIP 6"X2 STERILE LF 6012

## (undated) DEVICE — SU VICRYL 0 MO-4 CR 18" J701D

## (undated) DEVICE — LINEN FULL SHEET 5511

## (undated) DEVICE — SUCTION MANIFOLD NEPTUNE 2 SYS 4 PORT 0702-020-000

## (undated) RX ORDER — FENTANYL CITRATE 50 UG/ML
INJECTION, SOLUTION INTRAMUSCULAR; INTRAVENOUS
Status: DISPENSED
Start: 2017-09-05

## (undated) RX ORDER — ONDANSETRON 2 MG/ML
INJECTION INTRAMUSCULAR; INTRAVENOUS
Status: DISPENSED
Start: 2017-09-05

## (undated) RX ORDER — LIDOCAINE HYDROCHLORIDE 10 MG/ML
INJECTION, SOLUTION EPIDURAL; INFILTRATION; INTRACAUDAL; PERINEURAL
Status: DISPENSED
Start: 2017-09-05

## (undated) RX ORDER — CEFAZOLIN SODIUM 2 G/100ML
INJECTION, SOLUTION INTRAVENOUS
Status: DISPENSED
Start: 2017-09-05

## (undated) RX ORDER — OXYCODONE HCL 10 MG/1
TABLET, FILM COATED, EXTENDED RELEASE ORAL
Status: DISPENSED
Start: 2017-09-05

## (undated) RX ORDER — ACETAMINOPHEN 325 MG/1
TABLET ORAL
Status: DISPENSED
Start: 2017-09-05

## (undated) RX ORDER — PHENYLEPHRINE HCL IN 0.9% NACL 1 MG/10 ML
SYRINGE (ML) INTRAVENOUS
Status: DISPENSED
Start: 2017-09-05

## (undated) RX ORDER — OXYCODONE AND ACETAMINOPHEN 5; 325 MG/1; MG/1
TABLET ORAL
Status: DISPENSED
Start: 2017-09-05

## (undated) RX ORDER — DEXAMETHASONE SODIUM PHOSPHATE 4 MG/ML
INJECTION, SOLUTION INTRA-ARTICULAR; INTRALESIONAL; INTRAMUSCULAR; INTRAVENOUS; SOFT TISSUE
Status: DISPENSED
Start: 2017-09-05

## (undated) RX ORDER — GLYCOPYRROLATE 0.2 MG/ML
INJECTION INTRAMUSCULAR; INTRAVENOUS
Status: DISPENSED
Start: 2017-09-05

## (undated) RX ORDER — PROPOFOL 10 MG/ML
INJECTION, EMULSION INTRAVENOUS
Status: DISPENSED
Start: 2017-09-05